# Patient Record
Sex: MALE | Race: WHITE | NOT HISPANIC OR LATINO | Employment: OTHER | URBAN - METROPOLITAN AREA
[De-identification: names, ages, dates, MRNs, and addresses within clinical notes are randomized per-mention and may not be internally consistent; named-entity substitution may affect disease eponyms.]

---

## 2017-06-04 ENCOUNTER — GENERIC CONVERSION - ENCOUNTER (OUTPATIENT)
Dept: OTHER | Facility: OTHER | Age: 67
End: 2017-06-04

## 2022-02-25 ENCOUNTER — OFFICE VISIT (OUTPATIENT)
Dept: GASTROENTEROLOGY | Facility: CLINIC | Age: 72
End: 2022-02-25
Payer: MEDICARE

## 2022-02-25 ENCOUNTER — TELEPHONE (OUTPATIENT)
Dept: GASTROENTEROLOGY | Facility: CLINIC | Age: 72
End: 2022-02-25

## 2022-02-25 VITALS
WEIGHT: 208.2 LBS | DIASTOLIC BLOOD PRESSURE: 70 MMHG | BODY MASS INDEX: 29.81 KG/M2 | SYSTOLIC BLOOD PRESSURE: 118 MMHG | HEIGHT: 70 IN

## 2022-02-25 DIAGNOSIS — R19.7 DIARRHEA, UNSPECIFIED TYPE: Primary | ICD-10-CM

## 2022-02-25 PROBLEM — G35 MULTIPLE SCLEROSIS (HCC): Status: ACTIVE | Noted: 2022-02-25

## 2022-02-25 PROBLEM — Z79.02 ANTIPLATELET OR ANTITHROMBOTIC LONG-TERM USE: Status: ACTIVE | Noted: 2022-02-25

## 2022-02-25 PROCEDURE — 99204 OFFICE O/P NEW MOD 45 MIN: CPT | Performed by: NURSE PRACTITIONER

## 2022-02-25 RX ORDER — CHOLECALCIFEROL (VITAMIN D3) 1250 MCG
CAPSULE ORAL
COMMUNITY
End: 2022-05-26

## 2022-02-25 RX ORDER — LOPERAMIDE HYDROCHLORIDE 2 MG/1
2 CAPSULE ORAL 2 TIMES DAILY PRN
COMMUNITY
Start: 2022-01-23 | End: 2022-05-26

## 2022-02-25 RX ORDER — ACETAMINOPHEN 325 MG/1
650 TABLET ORAL EVERY 6 HOURS PRN
COMMUNITY

## 2022-02-25 RX ORDER — CHOLESTYRAMINE 4 G/9G
1 POWDER, FOR SUSPENSION ORAL DAILY
Qty: 30 PACKET | Refills: 5 | Status: ON HOLD | OUTPATIENT
Start: 2022-02-25 | End: 2022-05-23 | Stop reason: DRUGHIGH

## 2022-02-25 RX ORDER — CLOPIDOGREL BISULFATE 75 MG/1
75 TABLET ORAL DAILY
COMMUNITY

## 2022-02-25 RX ORDER — AMOXICILLIN AND CLAVULANATE POTASSIUM 875; 125 MG/1; MG/1
TABLET, FILM COATED ORAL
COMMUNITY
Start: 2022-01-11 | End: 2022-04-08 | Stop reason: ALTCHOICE

## 2022-02-25 RX ORDER — ASPIRIN 81 MG/1
81 TABLET ORAL DAILY
COMMUNITY

## 2022-02-25 RX ORDER — TAMSULOSIN HYDROCHLORIDE 0.4 MG/1
0.4 CAPSULE ORAL
COMMUNITY

## 2022-02-25 RX ORDER — GABAPENTIN 300 MG/1
300 CAPSULE ORAL 2 TIMES DAILY
COMMUNITY
Start: 2022-02-20

## 2022-02-25 RX ORDER — IBUPROFEN 600 MG/1
TABLET ORAL
COMMUNITY
End: 2022-05-26

## 2022-02-25 RX ORDER — PANTOPRAZOLE SODIUM 40 MG/1
40 TABLET, DELAYED RELEASE ORAL DAILY
COMMUNITY

## 2022-02-25 RX ORDER — POLYETHYLENE GLYCOL 400 AND PROPYLENE GLYCOL 4; 3 MG/ML; MG/ML
SOLUTION/ DROPS OPHTHALMIC
COMMUNITY
End: 2022-05-26

## 2022-02-25 RX ORDER — ROPINIROLE 0.5 MG/1
0.5 TABLET, FILM COATED ORAL
COMMUNITY

## 2022-02-25 RX ORDER — SODIUM PHOSPHATE, DIBASIC AND SODIUM PHOSPHATE, MONOBASIC 7; 19 G/133ML; G/133ML
1 ENEMA RECTAL
COMMUNITY
End: 2022-05-26

## 2022-02-25 RX ORDER — LORATADINE 10 MG/1
10 TABLET ORAL DAILY
COMMUNITY

## 2022-02-25 RX ORDER — BACLOFEN 10 MG/1
10 TABLET ORAL EVERY 6 HOURS PRN
COMMUNITY

## 2022-02-25 RX ORDER — GABAPENTIN 600 MG/1
600 TABLET ORAL
COMMUNITY
Start: 2022-02-01

## 2022-02-25 RX ORDER — BISACODYL 10 MG
10 SUPPOSITORY, RECTAL RECTAL DAILY
COMMUNITY
End: 2022-05-26

## 2022-02-25 NOTE — TELEPHONE ENCOUNTER
Patient was seen on 02/25/2022 and a colonoscopy was ordered  Patient needs to be scheduled at [facility]  Please contact Melody Conde to schedule  The main number is [phone], nurses station [phone], fax number [phone]

## 2022-02-25 NOTE — LETTER
February 25, 2022     Ana Mcintosh MD  [address] [place] [address] [address]    Patient: Sujey Lutz   YOB: 1950   Date of Visit: 2/25/2022       Dear Dr Lazarus Bottcher:    Thank you for referring Joyce Bolden to me for evaluation  Below are my notes for this consultation  If you have questions, please do not hesitate to call me  I look forward to following your patient along with you  Sincerely,        CATARINO Magana        CC: No Recipients  Savana Tam  2/25/2022 12:24 PM  Sign when Signing Visit    [facility] - Outpatient Consultation  Sujey Lutz 70 y o  male MRN: [id number]  Encounter: [id number]    ASSESSMENT AND PLAN:      1  Diarrhea, unspecified type   5 month history of loose stools which have progressed to watery diarrhea with bowel movements 4-5 times per day  No abdominal pain but fecal urgency and incontinence  No rectal bleeding or melena  Stool cultures performed at nursing facility were negative in January  Differential diagnosis includes microscopic colitis, possible colon mass or polyp  Recommend colonoscopy -none prior  - scheduled for colonoscopy at [facility]  - will add cholestyramine 1 packet daily  Can increase to b i d  if no improvement in diarrhea  - consider breath test to evaluate for SIBO if colonoscopy does not reveal etiology of diarrhea    2  Multiple sclerosis  Minimal ability to ambulate, currently wheelchair-bound    Followup Appointment:  2 months  ______________________________________________________________________    Chief Complaint   Patient presents with    Diarrhea     since he got the flu shot  HPI:   Sujey Lutz is a 70y o  year old male  with history of multiple sclerosis  He is a resident at [facility] and is wheelchair-bound    He  presents with 5 month history of loose to watery stools    Patient feels that his symptoms began after receiving flu shot in October  He reports that his bowels were initially soft but since January has had watery diarrhea 4-5 times per day  Denies abdominal pain or cramping but experiences urgency and incontinence  Denies melena or rectal bleeding  Denies abdominal bloating  Stool cultures were obtained by his PCP in January and were negative for C diff, ova and parasite, salmonella , shigella and  Campylobacter   Appetite has been good and he denies recent unintentional weight loss  He denies recent  change in his medications or addition of new med    He has never had a colonoscopy      Historical Information   Past Medical History:   Diagnosis Date    Atrial fibrillation ([facility] [address] )     BPH (benign prostatic hyperplasia)     Congestive heart failure (CHF) (HCC)     Hyperlipidemia     Hypertension     Multiple sclerosis (HCC)     Neuropathy     RLS (restless legs syndrome)      History reviewed  No pertinent surgical history  Social History     Substance and Sexual Activity   Alcohol Use Not Currently     Social History     Substance and Sexual Activity   Drug Use Not on file     Social History     Tobacco Use   Smoking Status Never Smoker   Smokeless Tobacco Never Used     History reviewed  No pertinent family history      Meds/Allergies     Current Outpatient Medications:     acetaminophen (TYLENOL) 325 mg tablet    amoxicillin-clavulanate (AUGMENTIN) 875-125 mg per tablet    aspirin (Aspir-Low) 81 mg EC tablet    baclofen 10 mg tablet    bisacodyl (Dulcolax) 10 mg suppository    Cholecalciferol (Vitamin D3) 1 25 MG (26155 UT) CAPS    clopidogrel (PLAVIX) 75 mg tablet    EMOLLIENT CREAM & WOUND GEL EX    gabapentin (NEURONTIN) 300 mg capsule    gabapentin (NEURONTIN) 600 MG tablet    Glucagon, rDNA, (Glucagon Emergency) 1 MG KIT    insulin lispro (HumaLOG) 100 units/mL injection    loperamide (IMODIUM) 2 mg capsule    loratadine (CLARITIN) 10 mg tablet    magnesium hydroxide (MILK OF MAGNESIA) 400 mg/5 mL oral suspension    pantoprazole (PROTONIX) 40 mg tablet    polyethylene glycol-propylene glycol (Systane) 0 4-0 3 %    Probiotic Product (PROBIOTIC PO)    rOPINIRole (REQUIP) 0 5 mg tablet    sodium chloride 0 9 % SOLN 100 mL with insulin regular 100 units/mL SOLN 100 Units    sodium phosphate-biphosphate (FLEET) 7-19 g 118 mL enema    tamsulosin (Flomax) 0 4 mg    white petrolatum-corn starch-lanolin (TRIPLE PASTE) 12 8 % ointment    Allergies   Allergen Reactions    Metformin GI Intolerance       PHYSICAL EXAM:    Blood pressure 118/70, height 5' 10" (1 778 m), weight 94 4 kg (208 lb 3 2 oz)  Body mass index is 29 87 kg/m²  General Appearance: wheelchair-bound  Appears comfortable  Eyes: Anicteric  ENT:  Normocephalic, atraumatic, normal mucosa  Neck:  Supple, symmetrical, trachea midline,   Resp:  Clear to auscultation bilaterally; no rales, rhonchi or wheezing; respirations unlabored   CV:  S1 S2, Regular rate and rhythm; no murmur, rub, or gallop  GI:  Soft, non-tender, non-distended; normal bowel sounds; no masses, no organomegaly   Rectal: Deferred  Musculoskeletal: No cyanosis, clubbing or edema  Normal ROM  Skin:  No jaundice, rashes, or lesions   Psych: Normal affect, good eye contact  Neuro: No gross deficits, AAOx3 and conversing appropriately  Able to provide full history          REVIEW OF SYSTEMS:    CONSTITUTIONAL: Denies any fever, chills, rigors, and weight loss  HEENT: No earache or tinnitus  Denies hearing loss or visual disturbances  CARDIOVASCULAR: No chest pain or palpitations  RESPIRATORY: Denies any cough, hemoptysis, shortness of breath or dyspnea on exertion  GASTROINTESTINAL: As noted in the History of Present Illness  GENITOURINARY: No problems with urination  Denies any hematuria or dysuria  NEUROLOGIC: No dizziness or vertigo, denies headaches  MUSCULOSKELETAL: Denies any muscle or joint pain  SKIN: Denies skin rashes or itching     ENDOCRINE: Denies excessive thirst  Denies intolerance to heat or cold  PSYCHOSOCIAL: Denies depression or anxiety  Denies any recent memory loss

## 2022-02-25 NOTE — PROGRESS NOTES
[facility] - Outpatient Consultation  Quiana Casanova 70 y o  male MRN: [id number]  Encounter: [id number]    ASSESSMENT AND PLAN:      1  Diarrhea, unspecified type   5 month history of loose stools which have progressed to watery diarrhea with bowel movements 4-5 times per day  No abdominal pain but fecal urgency and incontinence  No rectal bleeding or melena  Stool cultures performed at nursing facility were negative in January  Differential diagnosis includes microscopic colitis, possible colon mass or polyp  Recommend colonoscopy -none prior  - scheduled for colonoscopy at [facility]  - will add cholestyramine 1 packet daily  Can increase to b i d  if no improvement in diarrhea  - consider breath test to evaluate for SIBO if colonoscopy does not reveal etiology of diarrhea    2  Multiple sclerosis  Minimal ability to ambulate, currently wheelchair-bound    3  Anti-platelet therapy  4  Atherosclerotic disease  Currently taking aspirin 81 mg daily and clopidogrel 75 mg daily  Discussed with patient the risk of bleeding with clopidogrel and need to hold medication for 5 days prior to colonoscopy  Discuss risks of thromboembolic event with holding clopidogrel  He verbalizes understanding  - hold clopidogrel 75 mg daily 5 days prior to colonoscopy  - will check with his PCP to ensure no further recommendations or instructions needed    Followup Appointment:  2 months  ______________________________________________________________________    Chief Complaint   Patient presents with    Diarrhea     since he got the flu shot  HPI:   Quiana Casanova is a 70y o  year old male  with history of multiple sclerosis  He is a resident at [facility] and is wheelchair-bound    He  presents with 5 month history of loose to watery stools  Patient feels that his symptoms began after receiving flu shot in October    He reports that his bowels were initially soft but since January has had watery diarrhea 4-5 times per day  Denies abdominal pain or cramping but experiences urgency and incontinence  Denies melena or rectal bleeding  Denies abdominal bloating  Stool cultures were obtained by his PCP in January and were negative for C diff, ova and parasite, salmonella , shigella and  Campylobacter   Appetite has been good and he denies recent unintentional weight loss  He denies recent  change in his medications or addition of new med    He has never had a colonoscopy      Historical Information   Past Medical History:   Diagnosis Date    At[facility] fibrillation ([facility] [address] )     BPH (benign prostatic hyperplasia)     Congestive heart failure (CHF) (HCC)     Hyperlipidemia     Hypertension     Multiple sclerosis (HCC)     Neuropathy     RLS (restless legs syndrome)      History reviewed  No pertinent surgical history  Social History     Substance and Sexual Activity   Alcohol Use Not Currently     Social History     Substance and Sexual Activity   Drug Use Not on file     Social History     Tobacco Use   Smoking Status Never Smoker   Smokeless Tobacco Never Used     History reviewed  No pertinent family history      Meds/Allergies     Current Outpatient Medications:     acetaminophen (TYLENOL) 325 mg tablet    amoxicillin-clavulanate (AUGMENTIN) 875-125 mg per tablet    aspirin (Aspir-Low) 81 mg EC tablet    baclofen 10 mg tablet    bisacodyl (Dulcolax) 10 mg suppository    Cholecalciferol (Vitamin D3) 1 25 MG (37693 UT) CAPS    clopidogrel (PLAVIX) 75 mg tablet    EMOLLIENT CREAM & WOUND GEL EX    gabapentin (NEURONTIN) 300 mg capsule    gabapentin (NEURONTIN) 600 MG tablet    Glucagon, rDNA, (Glucagon Emergency) 1 MG KIT    insulin lispro (HumaLOG) 100 units/mL injection    loperamide (IMODIUM) 2 mg capsule    loratadine (CLARITIN) 10 mg tablet    magnesium hydroxide (MILK OF MAGNESIA) 400 mg/5 mL oral suspension    pantoprazole (PROTONIX) 40 mg tablet    polyethylene glycol-propylene glycol (Systane) 0 4-0 3 %    Probiotic Product (PROBIOTIC PO)    rOPINIRole (REQUIP) 0 5 mg tablet    sodium chloride 0 9 % SOLN 100 mL with insulin regular 100 units/mL SOLN 100 Units    sodium phosphate-biphosphate (FLEET) 7-19 g 118 mL enema    tamsulosin (Flomax) 0 4 mg    white petrolatum-corn starch-lanolin (TRIPLE PASTE) 12 8 % ointment    Allergies   Allergen Reactions    Metformin GI Intolerance       PHYSICAL EXAM:    Blood pressure 118/70, height 5' 10" (1 778 m), weight 94 4 kg (208 lb 3 2 oz)  Body mass index is 29 87 kg/m²  General Appearance: wheelchair-bound  Appears comfortable  Eyes: Anicteric  ENT:  Normocephalic, atraumatic, normal mucosa  Neck:  Supple, symmetrical, trachea midline,   Resp:  Clear to auscultation bilaterally; no rales, rhonchi or wheezing; respirations unlabored   CV:  S1 S2, Regular rate and rhythm; no murmur, rub, or gallop  GI:  Soft, non-tender, non-distended; normal bowel sounds; no masses, no organomegaly   Rectal: Deferred  Musculoskeletal: No cyanosis, clubbing or edema  Normal ROM  Skin:  No jaundice, rashes, or lesions   Psych: Normal affect, good eye contact  Neuro: No gross deficits, AAOx3 and conversing appropriately  Able to provide full history          REVIEW OF SYSTEMS:    CONSTITUTIONAL: Denies any fever, chills, rigors, and weight loss  HEENT: No earache or tinnitus  Denies hearing loss or visual disturbances  CARDIOVASCULAR: No chest pain or palpitations  RESPIRATORY: Denies any cough, hemoptysis, shortness of breath or dyspnea on exertion  GASTROINTESTINAL: As noted in the History of Present Illness  GENITOURINARY: No problems with urination  Denies any hematuria or dysuria  NEUROLOGIC: No dizziness or vertigo, denies headaches  MUSCULOSKELETAL: generalized weakness of extremities due to multiple sclerosis, minimally ambulatory  SKIN: Denies skin rashes or itching     ENDOCRINE: Denies excessive thirst  Denies intolerance to heat or cold  PSYCHOSOCIAL: Denies depression or anxiety  Denies any recent memory loss

## 2022-03-01 NOTE — TELEPHONE ENCOUNTER
[facility] pt for 3/29 colon at [facility] for colon  Didn't realize pt assessment was not done at visit- this will need to be completed during prep or plavix clearance call once obtained  faxed res sheet   paperwork given to MA dept  Thank you

## 2022-03-14 DIAGNOSIS — R19.7 DIARRHEA, UNSPECIFIED TYPE: Primary | ICD-10-CM

## 2022-03-14 NOTE — TELEPHONE ENCOUNTER
Scheduled date of colonoscopy (as of today): 3/29/2022  Physician performing colonoscopy: Dr Mushtaq Lee  Location of colonoscopy: [facility]  Bowel prep reviewed with patient: Jermain  Instructions reviewed with patient by: Jin Lehman CMA  Clearances: PLAVIX

## 2022-04-08 ENCOUNTER — OFFICE VISIT (OUTPATIENT)
Dept: GASTROENTEROLOGY | Facility: CLINIC | Age: 72
End: 2022-04-08
Payer: MEDICARE

## 2022-04-08 VITALS — BODY MASS INDEX: 29.87 KG/M2 | DIASTOLIC BLOOD PRESSURE: 68 MMHG | SYSTOLIC BLOOD PRESSURE: 114 MMHG | HEIGHT: 70 IN

## 2022-04-08 DIAGNOSIS — R19.7 DIARRHEA, UNSPECIFIED TYPE: Primary | ICD-10-CM

## 2022-04-08 PROCEDURE — 99213 OFFICE O/P EST LOW 20 MIN: CPT | Performed by: NURSE PRACTITIONER

## 2022-04-08 RX ORDER — CHOLESTYRAMINE LIGHT 4 G/5.7G
4 POWDER, FOR SUSPENSION ORAL 2 TIMES DAILY
COMMUNITY

## 2022-04-08 NOTE — LETTER
April 8, 2022     Salo Frank MD  [address]    Patient: Renea Rockwell   YOB: 1950   Date of Visit: 4/8/2022       Dear Dr Belinda Archuleta:    Thank you for referring Joe Tavarez to me for evaluation  Below are my notes for this consultation  If you have questions, please do not hesitate to call me  I look forward to following your patient along with you  Sincerely,        CATARINO Theodore        CC: No Recipients  CATARINO Theodore  4/8/2022 12:47 PM  Sign when Signing Visit  [facility] - Outpatient Follow-up Note  Renea Rockwell 70 y o  male MRN: [id number]  Encounter: [id number]    ASSESSMENT AND PLAN:      1  Diarrhea, unspecified type  Persistent  loose stools but denies watery diarrhea  Associated fecal urgency and some incontinence  Currently having 3 bowel movements daily and reports some improvement with Questran  Colonoscopy 03/2022 was normal, biopsies negative for microscopic colitis  Consider SIBO -he is diabetic  - check breath test to rule out SIBO  - continue Questran b i d   - Imodium p r n  Followup Appointment:  2-3 months  ______________________________________________________________________    Chief Complaint   Patient presents with    Follow-up - still with diarrhea     Feels like he lost weight but no recent weight  HPI:  Presents in follow-up today for persistent diarrhea  He underwent colonoscopy 03/29/2022 which was normal   Biopsies were negative for microscopic colitis  Patient continues to report 3 loose bowel movements daily and continues to have fecal urgency and incontinence  Does feel some improvement with addition of Questran b i d  Denies recent melena or rectal bleeding    Appetite good, no nausea or vomiting, no unintentional weight loss    Denies reflux symptoms    Medication list reviewed -he has not started any new medications and not taking any medications known to cause diarrhea     resident at [facility]  Historical Information   Past Medical History:   Diagnosis Date    At[facility] fibrillation ([facility] [address] )     BPH (benign prostatic hyperplasia)     Congestive heart failure (CHF) (HCC)     Hyperlipidemia     Hypertension     Multiple sclerosis (HCC)     Neuropathy     RLS (restless legs syndrome)      History reviewed  No pertinent surgical history  Social History     Substance and Sexual Activity   Alcohol Use Not Currently     Social History     Substance and Sexual Activity   Drug Use Not on file     Social History     Tobacco Use   Smoking Status Never Smoker   Smokeless Tobacco Never Used     History reviewed  No pertinent family history        Current Outpatient Medications:     acetaminophen (TYLENOL) 325 mg tablet    amoxicillin-clavulanate (AUGMENTIN) 875-125 mg per tablet    aspirin (Aspir-Low) 81 mg EC tablet    baclofen 10 mg tablet    bisacodyl (Dulcolax) 10 mg suppository    Cholecalciferol (Vitamin D3) 1 25 MG (60213 UT) CAPS    cholestyramine (QUESTRAN) 4 g packet    cholestyramine sugar free (QUESTRAN LIGHT) 4 g packet    clopidogrel (PLAVIX) 75 mg tablet    EMOLLIENT CREAM & WOUND GEL EX    gabapentin (NEURONTIN) 300 mg capsule    gabapentin (NEURONTIN) 600 MG tablet    Glucagon, rDNA, (Glucagon Emergency) 1 MG KIT    insulin lispro (HumaLOG) 100 units/mL injection    loperamide (IMODIUM) 2 mg capsule    loratadine (CLARITIN) 10 mg tablet    magnesium hydroxide (MILK OF MAGNESIA) 400 mg/5 mL oral suspension    pantoprazole (PROTONIX) 40 mg tablet    polyethylene glycol-propylene glycol (Systane) 0 4-0 3 %    Probiotic Product (PROBIOTIC PO)    rOPINIRole (REQUIP) 0 5 mg tablet    sodium chloride 0 9 % SOLN 100 mL with insulin regular 100 units/mL SOLN 100 Units    sodium phosphate-biphosphate (FLEET) 7-19 g 118 mL enema    tamsulosin (Flomax) 0 4 mg    white petrolatum-corn starch-lanolin (TRIPLE PASTE) 12 8 % ointment    polyethylene glycol (GOLYTELY) 4000 mL solution  Allergies   Allergen Reactions    Metformin GI Intolerance     Reviewed medications and allergies and updated as indicated    PHYSICAL EXAM:    Blood pressure 114/68, height 5' 10" (1 778 m)  Body mass index is 29 87 kg/m²  General Appearance: NAD, cooperative, alert  Eyes: Anicteric  ENT:  Normocephalic, atraumatic, normal mucosa  Neck:  Supple, symmetrical, trachea midline  Resp:  Clear to auscultation bilaterally; no rales, rhonchi or wheezing; respirations unlabored   CV:  S1 S2, Regular rate and rhythm; no murmur, rub, or gallop  GI:  Soft, non-tender, non-distended; normal bowel sounds; no masses, no organomegaly   Rectal: Deferred  Musculoskeletal: No cyanosis, clubbing or edema  Normal ROM    Skin:  No jaundice, rashes, or lesions   Psych: Normal affect, good eye contact  Neuro: No gross deficits, AAOx3

## 2022-04-08 NOTE — PROGRESS NOTES
[facility] - Outpatient Follow-up Note  Sai Morales 70 y o  male MRN: [id number]  Encounter: [id number]    ASSESSMENT AND PLAN:      1  Diarrhea, unspecified type  Persistent  loose stools but denies watery diarrhea  Associated fecal urgency and some incontinence  Currently having 3 bowel movements daily and reports some improvement with Questran  Colonoscopy 03/2022 was normal, biopsies negative for microscopic colitis  Consider SIBO -he is diabetic  - check breath test to rule out SIBO  - continue Questran b i d   - Imodium p r n  Followup Appointment:  2-3 months  ______________________________________________________________________    Chief Complaint   Patient presents with    Follow-up - still with diarrhea     Feels like he lost weight but no recent weight  HPI:  Presents in follow-up today for persistent diarrhea  He underwent colonoscopy 03/29/2022 which was normal   Biopsies were negative for microscopic colitis  Patient continues to report 3 loose bowel movements daily and continues to have fecal urgency and incontinence  Does feel some improvement with addition of Questran b i d  Denies recent melena or rectal bleeding    Appetite good, no nausea or vomiting, no unintentional weight loss  Denies reflux symptoms    Medication list reviewed -he has not started any new medications and not taking any medications known to cause diarrhea     resident at [facility]  Historical Information   Past Medical History:   Diagnosis Date    [facility] fibrillation ([facility] [address] )     BPH (benign prostatic hyperplasia)     [facility]     Hyperlipidemia     Hypertension     Multiple sclerosis (HCC)     Neuropathy     RLS (restless legs syndrome)      History reviewed  No pertinent surgical history    Social History     Substance and Sexual Activity   Alcohol Use Not Currently     Social History     Substance and Sexual Activity   Drug Use Not on file     Social History     Tobacco Use   Smoking Status Never Smoker   Smokeless Tobacco Never Used     History reviewed  No pertinent family history  Current Outpatient Medications:     acetaminophen (TYLENOL) 325 mg tablet    amoxicillin-clavulanate (AUGMENTIN) 875-125 mg per tablet    aspirin (Aspir-Low) 81 mg EC tablet    baclofen 10 mg tablet    bisacodyl (Dulcolax) 10 mg suppository    Cholecalciferol (Vitamin D3) 1 25 MG (79130 UT) CAPS    cholestyramine (QUESTRAN) 4 g packet    cholestyramine sugar free (QUESTRAN LIGHT) 4 g packet    clopidogrel (PLAVIX) 75 mg tablet    EMOLLIENT CREAM & WOUND GEL EX    gabapentin (NEURONTIN) 300 mg capsule    gabapentin (NEURONTIN) 600 MG tablet    Glucagon, rDNA, (Glucagon Emergency) 1 MG KIT    insulin lispro (HumaLOG) 100 units/mL injection    loperamide (IMODIUM) 2 mg capsule    loratadine (CLARITIN) 10 mg tablet    magnesium hydroxide (MILK OF MAGNESIA) 400 mg/5 mL oral suspension    pantoprazole (PROTONIX) 40 mg tablet    polyethylene glycol-propylene glycol (Systane) 0 4-0 3 %    Probiotic Product (PROBIOTIC PO)    rOPINIRole (REQUIP) 0 5 mg tablet    sodium chloride 0 9 % SOLN 100 mL with insulin regular 100 units/mL SOLN 100 Units    sodium phosphate-biphosphate (FLEET) 7-19 g 118 mL enema    tamsulosin (Flomax) 0 4 mg    white petrolatum-corn starch-lanolin (TRIPLE PASTE) 12 8 % ointment    polyethylene glycol (GOLYTELY) 4000 mL solution  Allergies   Allergen Reactions    Metformin GI Intolerance     Reviewed medications and allergies and updated as indicated    PHYSICAL EXAM:    Blood pressure 114/68, height 5' 10" (1 778 m)  Body mass index is 29 87 kg/m²  General Appearance: NAD, cooperative, alert  Eyes: Anicteric  ENT:  Normocephalic, atraumatic, normal mucosa      Neck:  Supple, symmetrical, trachea midline  Resp:  Clear to auscultation bilaterally; no rales, rhonchi or wheezing; respirations unlabored   CV:  S1 S2, Regular rate and rhythm; no murmur, rub, or gallop  GI:  Soft, non-tender, non-distended; normal bowel sounds; no masses, no organomegaly   Rectal: Deferred  Musculoskeletal: No cyanosis, clubbing or edema  Normal ROM    Skin:  No jaundice, rashes, or lesions   Psych: Normal affect, good eye contact  Neuro: No gross deficits, AAOx3

## 2022-04-20 ENCOUNTER — OFFICE VISIT (OUTPATIENT)
Dept: GASTROENTEROLOGY | Facility: CLINIC | Age: 72
End: 2022-04-20
Payer: MEDICARE

## 2022-04-20 DIAGNOSIS — R19.7 DIARRHEA, UNSPECIFIED TYPE: Primary | ICD-10-CM

## 2022-04-20 PROCEDURE — 91065 BREATH HYDROGEN/METHANE TEST: CPT | Performed by: NURSE PRACTITIONER

## 2022-04-21 NOTE — PROGRESS NOTES
[facility] SPECIALTY [facility]       Bacterial Overgrowth Analytical Record    Apurva Dubon 70 y o  male MRN: [id number]      Date of Test: 4/18/2022    Substrate Given: Lactulose    Ordering Provider: CATARINO Emery    Medical Assistant    Symptoms: diarrhea    The patient presents for bacterial overgrowth testing  Patient fasted overnight  Baseline readings obtained  Breath test performed every 20 min for a total of 3 hr    Sample Clock Time ppmH2 ppmCH4 Co2% Maria G   Baseline   10:04 1 43 4 6 1 19   #1  20 minutes 10:28 1 40 4 6 1 19   #2  40 minutes 10:52 1 40 4 5 1 22   #3  60 minutes 11:13 3 49 4 2 1 30   #4  80 minutes 11:36 4 54 4 7 1 17   #5  100 minutes 11:57 2 55 4 7 1 17   #6  120 minutes 12:22 2 46 4 5 1 22   #7  140 minutes 12:45 4 44 4 2 1 30   #8  160 minutes 1:06 4 38 3 9 1 41   #9  180 minutes 1:29 9 50 4 4 1 25       Physician interpretation:  Results reviewed with Dr Ting Garcia    Breath test negative for SIBO  Patient is resident at [facility] contact nursing facility to inform Nursing of results

## 2022-04-25 ENCOUNTER — TELEPHONE (OUTPATIENT)
Dept: GASTROENTEROLOGY | Facility: CLINIC | Age: 72
End: 2022-04-25

## 2022-04-25 DIAGNOSIS — R19.7 DIARRHEA, UNSPECIFIED TYPE: Primary | ICD-10-CM

## 2022-04-25 NOTE — TELEPHONE ENCOUNTER
Spoke to RN at [facility] and informed her pt's breath test is negative  Recent colonoscopy normal, biopsy negative for microscopic colitis  Nursing reports he is still having soft stools 4-5 times per day  Taking Questran b i d  which has helped somewhat   will check stool cultures for C diff, ova and parasite, Giardia and enteric bacterial panel    If cultures negative, will begin Imodium or Lomotil 1-2 times per day scheduled dosing

## 2022-05-22 ENCOUNTER — APPOINTMENT (EMERGENCY)
Dept: RADIOLOGY | Facility: HOSPITAL | Age: 72
DRG: 291 | End: 2022-05-22
Payer: MEDICARE

## 2022-05-22 ENCOUNTER — HOSPITAL ENCOUNTER (INPATIENT)
Facility: HOSPITAL | Age: 72
LOS: 4 days | Discharge: NURSING, REHAB OR LONG-TERM CARE | DRG: 291 | End: 2022-05-26
Attending: EMERGENCY MEDICINE | Admitting: STUDENT IN AN ORGANIZED HEALTH CARE EDUCATION/TRAINING PROGRAM
Payer: MEDICARE

## 2022-05-22 DIAGNOSIS — E11.69 TYPE 2 DIABETES MELLITUS WITH OTHER SPECIFIED COMPLICATION, WITH LONG-TERM CURRENT USE OF INSULIN (HCC): ICD-10-CM

## 2022-05-22 DIAGNOSIS — J96.00 ACUTE RESPIRATORY FAILURE, UNSPECIFIED WHETHER WITH HYPOXIA OR HYPERCAPNIA (HCC): ICD-10-CM

## 2022-05-22 DIAGNOSIS — J81.0 ACUTE PULMONARY EDEMA (HCC): Primary | ICD-10-CM

## 2022-05-22 DIAGNOSIS — Z79.4 TYPE 2 DIABETES MELLITUS WITH OTHER SPECIFIED COMPLICATION, WITH LONG-TERM CURRENT USE OF INSULIN (HCC): ICD-10-CM

## 2022-05-22 DIAGNOSIS — G35 MULTIPLE SCLEROSIS (HCC): ICD-10-CM

## 2022-05-22 DIAGNOSIS — I10 PRIMARY HYPERTENSION: ICD-10-CM

## 2022-05-22 DIAGNOSIS — D75.839 THROMBOCYTOSIS: ICD-10-CM

## 2022-05-22 DIAGNOSIS — R77.8 ELEVATED TROPONIN: ICD-10-CM

## 2022-05-22 LAB
ALBUMIN SERPL BCP-MCNC: 3.3 G/DL (ref 3.5–5)
ALP SERPL-CCNC: 121 U/L (ref 46–116)
ALT SERPL W P-5'-P-CCNC: 12 U/L (ref 12–78)
ANION GAP SERPL CALCULATED.3IONS-SCNC: 7 MMOL/L (ref 4–13)
APTT PPP: 37 SECONDS (ref 23–37)
AST SERPL W P-5'-P-CCNC: 28 U/L (ref 5–45)
BASE EXCESS BLDA CALC-SCNC: -2 MMOL/L (ref -2–3)
BASOPHILS # BLD AUTO: 0.12 THOUSANDS/ΜL (ref 0–0.1)
BASOPHILS NFR BLD AUTO: 1 % (ref 0–1)
BILIRUB SERPL-MCNC: 0.4 MG/DL (ref 0.2–1)
BUN SERPL-MCNC: 21 MG/DL (ref 5–25)
CA-I BLD-SCNC: 1.11 MMOL/L (ref 1.12–1.32)
CALCIUM ALBUM COR SERPL-MCNC: 9 MG/DL (ref 8.3–10.1)
CALCIUM SERPL-MCNC: 8.4 MG/DL (ref 8.3–10.1)
CARDIAC TROPONIN I PNL SERPL HS: 448 NG/L
CHLORIDE SERPL-SCNC: 107 MMOL/L (ref 100–108)
CO2 SERPL-SCNC: 24 MMOL/L (ref 21–32)
CREAT SERPL-MCNC: 1.18 MG/DL (ref 0.6–1.3)
EOSINOPHIL # BLD AUTO: 0.37 THOUSAND/ΜL (ref 0–0.61)
EOSINOPHIL NFR BLD AUTO: 3 % (ref 0–6)
ERYTHROCYTE [DISTWIDTH] IN BLOOD BY AUTOMATED COUNT: 19.7 % (ref 11.6–15.1)
FLUAV RNA RESP QL NAA+PROBE: NEGATIVE
FLUBV RNA RESP QL NAA+PROBE: NEGATIVE
GFR SERPL CREATININE-BSD FRML MDRD: 61 ML/MIN/1.73SQ M
GLUCOSE SERPL-MCNC: 239 MG/DL (ref 65–140)
GLUCOSE SERPL-MCNC: 249 MG/DL (ref 65–140)
HCO3 BLDA-SCNC: 22.6 MMOL/L (ref 24–30)
HCT VFR BLD AUTO: 41.5 % (ref 36.5–49.3)
HCT VFR BLD CALC: 41 % (ref 36.5–49.3)
HGB BLD-MCNC: 12.6 G/DL (ref 12–17)
HGB BLDA-MCNC: 13.9 G/DL (ref 12–17)
IMM GRANULOCYTES # BLD AUTO: 0.08 THOUSAND/UL (ref 0–0.2)
IMM GRANULOCYTES NFR BLD AUTO: 1 % (ref 0–2)
INR PPP: 1.13 (ref 0.84–1.19)
LYMPHOCYTES # BLD AUTO: 0.86 THOUSANDS/ΜL (ref 0.6–4.47)
LYMPHOCYTES NFR BLD AUTO: 6 % (ref 14–44)
MCH RBC QN AUTO: 26.5 PG (ref 26.8–34.3)
MCHC RBC AUTO-ENTMCNC: 30.4 G/DL (ref 31.4–37.4)
MCV RBC AUTO: 87 FL (ref 82–98)
MONOCYTES # BLD AUTO: 1 THOUSAND/ΜL (ref 0.17–1.22)
MONOCYTES NFR BLD AUTO: 7 % (ref 4–12)
NEUTROPHILS # BLD AUTO: 11.85 THOUSANDS/ΜL (ref 1.85–7.62)
NEUTS SEG NFR BLD AUTO: 82 % (ref 43–75)
NRBC BLD AUTO-RTO: 0 /100 WBCS
NT-PROBNP SERPL-MCNC: 389 PG/ML
PCO2 BLD: 24 MMOL/L (ref 21–32)
PCO2 BLD: 38.1 MM HG (ref 42–50)
PH BLD: 7.38 [PH] (ref 7.3–7.4)
PLATELET # BLD AUTO: 1156 THOUSANDS/UL (ref 149–390)
PMV BLD AUTO: 10.8 FL (ref 8.9–12.7)
PO2 BLD: 71 MM HG (ref 35–45)
POTASSIUM BLD-SCNC: 3.8 MMOL/L (ref 3.5–5.3)
POTASSIUM SERPL-SCNC: 3.6 MMOL/L (ref 3.5–5.3)
PROT SERPL-MCNC: 7.4 G/DL (ref 6.4–8.2)
PROTHROMBIN TIME: 14.4 SECONDS (ref 11.6–14.5)
RBC # BLD AUTO: 4.76 MILLION/UL (ref 3.88–5.62)
RSV RNA RESP QL NAA+PROBE: NEGATIVE
SAO2 % BLD FROM PO2: 94 % (ref 60–85)
SARS-COV-2 RNA RESP QL NAA+PROBE: NEGATIVE
SODIUM BLD-SCNC: 140 MMOL/L (ref 136–145)
SODIUM SERPL-SCNC: 138 MMOL/L (ref 136–145)
SPECIMEN SOURCE: ABNORMAL
WBC # BLD AUTO: 14.28 THOUSAND/UL (ref 4.31–10.16)

## 2022-05-22 PROCEDURE — 85025 COMPLETE CBC W/AUTO DIFF WBC: CPT | Performed by: EMERGENCY MEDICINE

## 2022-05-22 PROCEDURE — 82947 ASSAY GLUCOSE BLOOD QUANT: CPT

## 2022-05-22 PROCEDURE — 83880 ASSAY OF NATRIURETIC PEPTIDE: CPT | Performed by: EMERGENCY MEDICINE

## 2022-05-22 PROCEDURE — 99291 CRITICAL CARE FIRST HOUR: CPT | Performed by: EMERGENCY MEDICINE

## 2022-05-22 PROCEDURE — 71045 X-RAY EXAM CHEST 1 VIEW: CPT

## 2022-05-22 PROCEDURE — 1123F ACP DISCUSS/DSCN MKR DOCD: CPT | Performed by: INTERNAL MEDICINE

## 2022-05-22 PROCEDURE — 84484 ASSAY OF TROPONIN QUANT: CPT | Performed by: EMERGENCY MEDICINE

## 2022-05-22 PROCEDURE — 84295 ASSAY OF SERUM SODIUM: CPT

## 2022-05-22 PROCEDURE — 94002 VENT MGMT INPAT INIT DAY: CPT

## 2022-05-22 PROCEDURE — 94760 N-INVAS EAR/PLS OXIMETRY 1: CPT

## 2022-05-22 PROCEDURE — 0241U HB NFCT DS VIR RESP RNA 4 TRGT: CPT | Performed by: EMERGENCY MEDICINE

## 2022-05-22 PROCEDURE — 85730 THROMBOPLASTIN TIME PARTIAL: CPT | Performed by: EMERGENCY MEDICINE

## 2022-05-22 PROCEDURE — 85610 PROTHROMBIN TIME: CPT | Performed by: EMERGENCY MEDICINE

## 2022-05-22 PROCEDURE — 36415 COLL VENOUS BLD VENIPUNCTURE: CPT | Performed by: EMERGENCY MEDICINE

## 2022-05-22 PROCEDURE — 93005 ELECTROCARDIOGRAM TRACING: CPT

## 2022-05-22 PROCEDURE — 84132 ASSAY OF SERUM POTASSIUM: CPT

## 2022-05-22 PROCEDURE — 83036 HEMOGLOBIN GLYCOSYLATED A1C: CPT | Performed by: PHYSICIAN ASSISTANT

## 2022-05-22 PROCEDURE — 85014 HEMATOCRIT: CPT

## 2022-05-22 PROCEDURE — 99291 CRITICAL CARE FIRST HOUR: CPT

## 2022-05-22 PROCEDURE — 82803 BLOOD GASES ANY COMBINATION: CPT

## 2022-05-22 PROCEDURE — 99223 1ST HOSP IP/OBS HIGH 75: CPT | Performed by: PHYSICIAN ASSISTANT

## 2022-05-22 PROCEDURE — 96374 THER/PROPH/DIAG INJ IV PUSH: CPT

## 2022-05-22 PROCEDURE — 82330 ASSAY OF CALCIUM: CPT

## 2022-05-22 PROCEDURE — 80053 COMPREHEN METABOLIC PANEL: CPT | Performed by: EMERGENCY MEDICINE

## 2022-05-22 RX ORDER — FUROSEMIDE 10 MG/ML
40 INJECTION INTRAMUSCULAR; INTRAVENOUS ONCE
Status: COMPLETED | OUTPATIENT
Start: 2022-05-22 | End: 2022-05-22

## 2022-05-22 RX ORDER — NITROGLYCERIN 20 MG/100ML
5-200 INJECTION INTRAVENOUS
Status: DISCONTINUED | OUTPATIENT
Start: 2022-05-22 | End: 2022-05-22

## 2022-05-22 RX ORDER — SODIUM CHLORIDE 9 MG/ML
3 INJECTION INTRAVENOUS
Status: DISCONTINUED | OUTPATIENT
Start: 2022-05-22 | End: 2022-05-23

## 2022-05-22 RX ADMIN — FUROSEMIDE 40 MG: 10 INJECTION, SOLUTION INTRAVENOUS at 21:59

## 2022-05-22 NOTE — Clinical Note
Case was discussed with VALERIE WALKER and the patient's admission status was agreed to be Admission Status: observation status to the service of Dr Camacho Donovan

## 2022-05-23 ENCOUNTER — APPOINTMENT (INPATIENT)
Dept: NON INVASIVE DIAGNOSTICS | Facility: HOSPITAL | Age: 72
DRG: 291 | End: 2022-05-23
Payer: MEDICARE

## 2022-05-23 PROBLEM — Z79.4 TYPE 2 DIABETES MELLITUS, WITH LONG-TERM CURRENT USE OF INSULIN (HCC): Status: ACTIVE | Noted: 2022-05-23

## 2022-05-23 PROBLEM — D75.839 THROMBOCYTOSIS: Status: ACTIVE | Noted: 2022-05-23

## 2022-05-23 PROBLEM — J81.0 ACUTE PULMONARY EDEMA (HCC): Status: ACTIVE | Noted: 2022-05-23

## 2022-05-23 PROBLEM — R65.10 SIRS (SYSTEMIC INFLAMMATORY RESPONSE SYNDROME) (HCC): Status: ACTIVE | Noted: 2022-05-23

## 2022-05-23 PROBLEM — J96.00 ACUTE RESPIRATORY FAILURE (HCC): Status: ACTIVE | Noted: 2022-05-23

## 2022-05-23 PROBLEM — I25.10 CORONARY ARTERY DISEASE INVOLVING NATIVE CORONARY ARTERY: Status: ACTIVE | Noted: 2022-05-23

## 2022-05-23 PROBLEM — E11.9 TYPE 2 DIABETES MELLITUS, WITH LONG-TERM CURRENT USE OF INSULIN (HCC): Status: ACTIVE | Noted: 2022-05-23

## 2022-05-23 PROBLEM — R77.8 ELEVATED TROPONIN: Status: ACTIVE | Noted: 2022-05-23

## 2022-05-23 PROBLEM — J96.01 ACUTE RESPIRATORY FAILURE WITH HYPOXIA (HCC): Status: ACTIVE | Noted: 2022-05-23

## 2022-05-23 LAB
2HR DELTA HS TROPONIN: 215 NG/L
4HR DELTA HS TROPONIN: 642 NG/L
ALBUMIN SERPL BCP-MCNC: 3.4 G/DL (ref 3.5–5)
ALP SERPL-CCNC: 114 U/L (ref 46–116)
ALT SERPL W P-5'-P-CCNC: 17 U/L (ref 12–78)
ANION GAP SERPL CALCULATED.3IONS-SCNC: 8 MMOL/L (ref 4–13)
AORTIC ROOT: 3.3 CM
APICAL FOUR CHAMBER EJECTION FRACTION: 55 %
APTT PPP: 39 SECONDS (ref 23–37)
APTT PPP: 43 SECONDS (ref 23–37)
APTT PPP: 50 SECONDS (ref 23–37)
ASCENDING AORTA: 3.1 CM
AST SERPL W P-5'-P-CCNC: 31 U/L (ref 5–45)
ATRIAL RATE: 121 BPM
ATRIAL RATE: 91 BPM
BACTERIA UR QL AUTO: ABNORMAL /HPF
BILIRUB SERPL-MCNC: 0.4 MG/DL (ref 0.2–1)
BILIRUB UR QL STRIP: NEGATIVE
BUN SERPL-MCNC: 21 MG/DL (ref 5–25)
CALCIUM ALBUM COR SERPL-MCNC: 9.1 MG/DL (ref 8.3–10.1)
CALCIUM SERPL-MCNC: 8.6 MG/DL (ref 8.3–10.1)
CARDIAC TROPONIN I PNL SERPL HS: 1090 NG/L
CARDIAC TROPONIN I PNL SERPL HS: 1439 NG/L (ref 8–18)
CARDIAC TROPONIN I PNL SERPL HS: 663 NG/L
CHLORIDE SERPL-SCNC: 104 MMOL/L (ref 100–108)
CLARITY UR: CLEAR
CO2 SERPL-SCNC: 30 MMOL/L (ref 21–32)
COLOR UR: YELLOW
CREAT SERPL-MCNC: 1.29 MG/DL (ref 0.6–1.3)
DOP CALC LVOT AREA: 4.91 CM2
DOP CALC LVOT DIAMETER: 2.5 CM
E WAVE DECELERATION TIME: 182 MS
ERYTHROCYTE [DISTWIDTH] IN BLOOD BY AUTOMATED COUNT: 19.8 % (ref 11.6–15.1)
EST. AVERAGE GLUCOSE BLD GHB EST-MCNC: 105 MG/DL
FRACTIONAL SHORTENING: 36 % (ref 28–44)
GFR SERPL CREATININE-BSD FRML MDRD: 55 ML/MIN/1.73SQ M
GLUCOSE SERPL-MCNC: 109 MG/DL (ref 65–140)
GLUCOSE SERPL-MCNC: 122 MG/DL (ref 65–140)
GLUCOSE SERPL-MCNC: 149 MG/DL (ref 65–140)
GLUCOSE SERPL-MCNC: 192 MG/DL (ref 65–140)
GLUCOSE SERPL-MCNC: 199 MG/DL (ref 65–140)
GLUCOSE SERPL-MCNC: 88 MG/DL (ref 65–140)
GLUCOSE UR STRIP-MCNC: NEGATIVE MG/DL
HBA1C MFR BLD: 5.3 %
HCT VFR BLD AUTO: 41.8 % (ref 36.5–49.3)
HGB BLD-MCNC: 12.9 G/DL (ref 12–17)
HGB UR QL STRIP.AUTO: NEGATIVE
INTERVENTRICULAR SEPTUM IN DIASTOLE (PARASTERNAL SHORT AXIS VIEW): 1.2 CM
INTERVENTRICULAR SEPTUM: 1.2 CM (ref 0.6–1.1)
KETONES UR STRIP-MCNC: NEGATIVE MG/DL
LAAS-AP2: 24.6 CM2
LAAS-AP4: 20.4 CM2
LEFT ATRIUM AREA SYSTOLE SINGLE PLANE A4C: 18.5 CM2
LEFT ATRIUM SIZE: 3.9 CM
LEFT INTERNAL DIMENSION IN SYSTOLE: 2.9 CM (ref 2.1–4)
LEFT VENTRICULAR INTERNAL DIMENSION IN DIASTOLE: 4.5 CM (ref 3.5–6)
LEFT VENTRICULAR POSTERIOR WALL IN END DIASTOLE: 1.1 CM
LEFT VENTRICULAR STROKE VOLUME: 58 ML
LEUKOCYTE ESTERASE UR QL STRIP: NEGATIVE
LVSV (TEICH): 58 ML
MAGNESIUM SERPL-MCNC: 2.3 MG/DL (ref 1.6–2.6)
MCH RBC QN AUTO: 27 PG (ref 26.8–34.3)
MCHC RBC AUTO-ENTMCNC: 30.9 G/DL (ref 31.4–37.4)
MCV RBC AUTO: 87 FL (ref 82–98)
MV E'TISSUE VEL-SEP: 6 CM/S
MV PEAK A VEL: 0.58 M/S
MV PEAK E VEL: 92 CM/S
MV STENOSIS PRESSURE HALF TIME: 53 MS
MV VALVE AREA P 1/2 METHOD: 4.15 CM2
NITRITE UR QL STRIP: NEGATIVE
NON-SQ EPI CELLS URNS QL MICRO: ABNORMAL /HPF
P AXIS: 31 DEGREES
P AXIS: 49 DEGREES
PH UR STRIP.AUTO: 5.5 [PH]
PLATELET # BLD AUTO: 1251 THOUSANDS/UL (ref 149–390)
PMV BLD AUTO: 10.6 FL (ref 8.9–12.7)
POTASSIUM SERPL-SCNC: 3.7 MMOL/L (ref 3.5–5.3)
PR INTERVAL: 142 MS
PR INTERVAL: 150 MS
PROT SERPL-MCNC: 7.7 G/DL (ref 6.4–8.2)
PROT UR STRIP-MCNC: ABNORMAL MG/DL
QRS AXIS: -15 DEGREES
QRS AXIS: 21 DEGREES
QRSD INTERVAL: 92 MS
QRSD INTERVAL: 94 MS
QT INTERVAL: 346 MS
QT INTERVAL: 380 MS
QTC INTERVAL: 467 MS
QTC INTERVAL: 491 MS
RBC # BLD AUTO: 4.78 MILLION/UL (ref 3.88–5.62)
RBC #/AREA URNS AUTO: ABNORMAL /HPF
RIGHT ATRIUM AREA SYSTOLE A4C: 16.6 CM2
RIGHT VENTRICLE ID DIMENSION: 3.5 CM
SL CV LEFT ATRIUM LENGTH A2C: 6.9 CM
SL CV LV EF: 45
SL CV PED ECHO LEFT VENTRICLE DIASTOLIC VOLUME (MOD BIPLANE) 2D: 91 ML
SL CV PED ECHO LEFT VENTRICLE SYSTOLIC VOLUME (MOD BIPLANE) 2D: 34 ML
SODIUM SERPL-SCNC: 142 MMOL/L (ref 136–145)
SP GR UR STRIP.AUTO: 1.02 (ref 1–1.03)
T WAVE AXIS: 107 DEGREES
T WAVE AXIS: 82 DEGREES
TRICUSPID ANNULAR PLANE SYSTOLIC EXCURSION: 2.1 CM
UROBILINOGEN UR QL STRIP.AUTO: 0.2 E.U./DL
VENTRICULAR RATE: 121 BPM
VENTRICULAR RATE: 91 BPM
WBC # BLD AUTO: 14.4 THOUSAND/UL (ref 4.31–10.16)
WBC #/AREA URNS AUTO: ABNORMAL /HPF

## 2022-05-23 PROCEDURE — 93010 ELECTROCARDIOGRAM REPORT: CPT | Performed by: INTERNAL MEDICINE

## 2022-05-23 PROCEDURE — 82948 REAGENT STRIP/BLOOD GLUCOSE: CPT

## 2022-05-23 PROCEDURE — 81279 JAK2 GENE TRGT SEQUENCE ALYS: CPT | Performed by: HOSPITALIST

## 2022-05-23 PROCEDURE — 99223 1ST HOSP IP/OBS HIGH 75: CPT | Performed by: INTERNAL MEDICINE

## 2022-05-23 PROCEDURE — 85027 COMPLETE CBC AUTOMATED: CPT | Performed by: STUDENT IN AN ORGANIZED HEALTH CARE EDUCATION/TRAINING PROGRAM

## 2022-05-23 PROCEDURE — 84484 ASSAY OF TROPONIN QUANT: CPT | Performed by: PHYSICIAN ASSISTANT

## 2022-05-23 PROCEDURE — 83735 ASSAY OF MAGNESIUM: CPT | Performed by: STUDENT IN AN ORGANIZED HEALTH CARE EDUCATION/TRAINING PROGRAM

## 2022-05-23 PROCEDURE — 85730 THROMBOPLASTIN TIME PARTIAL: CPT | Performed by: HOSPITALIST

## 2022-05-23 PROCEDURE — 99232 SBSQ HOSP IP/OBS MODERATE 35: CPT | Performed by: HOSPITALIST

## 2022-05-23 PROCEDURE — C8929 TTE W OR WO FOL WCON,DOPPLER: HCPCS

## 2022-05-23 PROCEDURE — 93306 TTE W/DOPPLER COMPLETE: CPT | Performed by: INTERNAL MEDICINE

## 2022-05-23 PROCEDURE — 80053 COMPREHEN METABOLIC PANEL: CPT | Performed by: STUDENT IN AN ORGANIZED HEALTH CARE EDUCATION/TRAINING PROGRAM

## 2022-05-23 PROCEDURE — 93005 ELECTROCARDIOGRAM TRACING: CPT

## 2022-05-23 PROCEDURE — 81001 URINALYSIS AUTO W/SCOPE: CPT | Performed by: PHYSICIAN ASSISTANT

## 2022-05-23 PROCEDURE — 99222 1ST HOSP IP/OBS MODERATE 55: CPT | Performed by: INTERNAL MEDICINE

## 2022-05-23 PROCEDURE — 85730 THROMBOPLASTIN TIME PARTIAL: CPT | Performed by: PHYSICIAN ASSISTANT

## 2022-05-23 PROCEDURE — 85730 THROMBOPLASTIN TIME PARTIAL: CPT | Performed by: STUDENT IN AN ORGANIZED HEALTH CARE EDUCATION/TRAINING PROGRAM

## 2022-05-23 RX ORDER — BACLOFEN 10 MG/1
10 TABLET ORAL EVERY 6 HOURS PRN
Status: DISCONTINUED | OUTPATIENT
Start: 2022-05-23 | End: 2022-05-26 | Stop reason: HOSPADM

## 2022-05-23 RX ORDER — HEPARIN SODIUM 10000 [USP'U]/100ML
3-20 INJECTION, SOLUTION INTRAVENOUS
Status: DISCONTINUED | OUTPATIENT
Start: 2022-05-23 | End: 2022-05-25

## 2022-05-23 RX ORDER — MORPHINE SULFATE 20 MG/5ML
5 SOLUTION ORAL EVERY 2 HOUR PRN
COMMUNITY
End: 2022-05-26

## 2022-05-23 RX ORDER — ROPINIROLE 0.25 MG/1
0.5 TABLET, FILM COATED ORAL
Status: DISCONTINUED | OUTPATIENT
Start: 2022-05-23 | End: 2022-05-26 | Stop reason: HOSPADM

## 2022-05-23 RX ORDER — CLOPIDOGREL BISULFATE 75 MG/1
75 TABLET ORAL DAILY
Status: DISCONTINUED | OUTPATIENT
Start: 2022-05-23 | End: 2022-05-26 | Stop reason: HOSPADM

## 2022-05-23 RX ORDER — HYDROXYUREA 500 MG/1
500 CAPSULE ORAL 3 TIMES DAILY
Status: DISCONTINUED | OUTPATIENT
Start: 2022-05-23 | End: 2022-05-25

## 2022-05-23 RX ORDER — INSULIN LISPRO 100 [IU]/ML
5 INJECTION, SOLUTION INTRAVENOUS; SUBCUTANEOUS
Status: DISCONTINUED | OUTPATIENT
Start: 2022-05-23 | End: 2022-05-26 | Stop reason: HOSPADM

## 2022-05-23 RX ORDER — LORATADINE 10 MG/1
10 TABLET ORAL DAILY
Status: DISCONTINUED | OUTPATIENT
Start: 2022-05-23 | End: 2022-05-26 | Stop reason: HOSPADM

## 2022-05-23 RX ORDER — ASPIRIN 81 MG/1
81 TABLET ORAL DAILY
Status: DISCONTINUED | OUTPATIENT
Start: 2022-05-23 | End: 2022-05-26 | Stop reason: HOSPADM

## 2022-05-23 RX ORDER — HEPARIN SODIUM 5000 [USP'U]/ML
5000 INJECTION, SOLUTION INTRAVENOUS; SUBCUTANEOUS EVERY 8 HOURS SCHEDULED
Status: DISCONTINUED | OUTPATIENT
Start: 2022-05-23 | End: 2022-05-23

## 2022-05-23 RX ORDER — CHOLESTYRAMINE LIGHT 4 G/5.7G
4 POWDER, FOR SUSPENSION ORAL 2 TIMES DAILY
Status: DISCONTINUED | OUTPATIENT
Start: 2022-05-23 | End: 2022-05-26 | Stop reason: HOSPADM

## 2022-05-23 RX ORDER — INSULIN LISPRO 100 [IU]/ML
10 INJECTION, SOLUTION INTRAVENOUS; SUBCUTANEOUS
COMMUNITY
End: 2022-05-26

## 2022-05-23 RX ORDER — GABAPENTIN 300 MG/1
300 CAPSULE ORAL 2 TIMES DAILY
Status: DISCONTINUED | OUTPATIENT
Start: 2022-05-23 | End: 2022-05-26 | Stop reason: HOSPADM

## 2022-05-23 RX ORDER — INSULIN LISPRO 100 [IU]/ML
1-6 INJECTION, SOLUTION INTRAVENOUS; SUBCUTANEOUS
Status: DISCONTINUED | OUTPATIENT
Start: 2022-05-23 | End: 2022-05-26 | Stop reason: HOSPADM

## 2022-05-23 RX ORDER — HEPARIN SODIUM 1000 [USP'U]/ML
2000 INJECTION, SOLUTION INTRAVENOUS; SUBCUTANEOUS
Status: DISCONTINUED | OUTPATIENT
Start: 2022-05-23 | End: 2022-05-25

## 2022-05-23 RX ORDER — PANTOPRAZOLE SODIUM 40 MG/1
40 TABLET, DELAYED RELEASE ORAL
Status: DISCONTINUED | OUTPATIENT
Start: 2022-05-23 | End: 2022-05-26 | Stop reason: HOSPADM

## 2022-05-23 RX ORDER — INSULIN LISPRO 100 [IU]/ML
1-5 INJECTION, SOLUTION INTRAVENOUS; SUBCUTANEOUS
Status: DISCONTINUED | OUTPATIENT
Start: 2022-05-23 | End: 2022-05-26 | Stop reason: HOSPADM

## 2022-05-23 RX ORDER — HEPARIN SODIUM 1000 [USP'U]/ML
4000 INJECTION, SOLUTION INTRAVENOUS; SUBCUTANEOUS
Status: DISCONTINUED | OUTPATIENT
Start: 2022-05-23 | End: 2022-05-25

## 2022-05-23 RX ORDER — INSULIN GLARGINE 100 [IU]/ML
35 INJECTION, SOLUTION SUBCUTANEOUS
COMMUNITY
End: 2022-05-26

## 2022-05-23 RX ORDER — ATORVASTATIN CALCIUM 40 MG/1
40 TABLET, FILM COATED ORAL
Status: DISCONTINUED | OUTPATIENT
Start: 2022-05-23 | End: 2022-05-26 | Stop reason: HOSPADM

## 2022-05-23 RX ORDER — GABAPENTIN 300 MG/1
600 CAPSULE ORAL
Status: DISCONTINUED | OUTPATIENT
Start: 2022-05-23 | End: 2022-05-26 | Stop reason: HOSPADM

## 2022-05-23 RX ORDER — FUROSEMIDE 10 MG/ML
40 INJECTION INTRAMUSCULAR; INTRAVENOUS
Status: DISCONTINUED | OUTPATIENT
Start: 2022-05-23 | End: 2022-05-25

## 2022-05-23 RX ORDER — TAMSULOSIN HYDROCHLORIDE 0.4 MG/1
0.4 CAPSULE ORAL
Status: DISCONTINUED | OUTPATIENT
Start: 2022-05-23 | End: 2022-05-26 | Stop reason: HOSPADM

## 2022-05-23 RX ORDER — INSULIN GLARGINE 100 [IU]/ML
20 INJECTION, SOLUTION SUBCUTANEOUS
Status: DISCONTINUED | OUTPATIENT
Start: 2022-05-23 | End: 2022-05-26 | Stop reason: HOSPADM

## 2022-05-23 RX ADMIN — HEPARIN SODIUM 2000 UNITS: 1000 INJECTION INTRAVENOUS; SUBCUTANEOUS at 19:47

## 2022-05-23 RX ADMIN — ROPINIROLE HYDROCHLORIDE 0.5 MG: 0.25 TABLET, FILM COATED ORAL at 21:51

## 2022-05-23 RX ADMIN — LORATADINE 10 MG: 10 TABLET ORAL at 15:42

## 2022-05-23 RX ADMIN — INSULIN LISPRO 1 UNITS: 100 INJECTION, SOLUTION INTRAVENOUS; SUBCUTANEOUS at 01:10

## 2022-05-23 RX ADMIN — HEPARIN SODIUM 2000 UNITS: 1000 INJECTION INTRAVENOUS; SUBCUTANEOUS at 11:40

## 2022-05-23 RX ADMIN — ROPINIROLE HYDROCHLORIDE 0.5 MG: 0.25 TABLET, FILM COATED ORAL at 01:06

## 2022-05-23 RX ADMIN — PERFLUTREN 0.8 ML/MIN: 6.52 INJECTION, SUSPENSION INTRAVENOUS at 11:25

## 2022-05-23 RX ADMIN — HEPARIN SODIUM 11.1 UNITS/KG/HR: 10000 INJECTION, SOLUTION INTRAVENOUS at 02:37

## 2022-05-23 RX ADMIN — ATORVASTATIN CALCIUM 40 MG: 40 TABLET, FILM COATED ORAL at 15:40

## 2022-05-23 RX ADMIN — CLOPIDOGREL BISULFATE 75 MG: 75 TABLET ORAL at 08:51

## 2022-05-23 RX ADMIN — PANTOPRAZOLE SODIUM 40 MG: 40 TABLET, DELAYED RELEASE ORAL at 08:54

## 2022-05-23 RX ADMIN — HYDROXYUREA 500 MG: 500 CAPSULE ORAL at 15:40

## 2022-05-23 RX ADMIN — CHOLESTYRAMINE 4 G: 4 POWDER, FOR SUSPENSION ORAL at 08:51

## 2022-05-23 RX ADMIN — HYDROXYUREA 500 MG: 500 CAPSULE ORAL at 21:51

## 2022-05-23 RX ADMIN — GABAPENTIN 300 MG: 300 CAPSULE ORAL at 08:52

## 2022-05-23 RX ADMIN — INSULIN LISPRO 5 UNITS: 100 INJECTION, SOLUTION INTRAVENOUS; SUBCUTANEOUS at 11:33

## 2022-05-23 RX ADMIN — HYDROXYUREA 500 MG: 500 CAPSULE ORAL at 08:53

## 2022-05-23 RX ADMIN — CHOLESTYRAMINE 4 G: 4 POWDER, FOR SUSPENSION ORAL at 17:13

## 2022-05-23 RX ADMIN — FUROSEMIDE 40 MG: 10 INJECTION, SOLUTION INTRAMUSCULAR; INTRAVENOUS at 08:52

## 2022-05-23 RX ADMIN — INSULIN LISPRO 5 UNITS: 100 INJECTION, SOLUTION INTRAVENOUS; SUBCUTANEOUS at 07:44

## 2022-05-23 RX ADMIN — TAMSULOSIN HYDROCHLORIDE 0.4 MG: 0.4 CAPSULE ORAL at 15:40

## 2022-05-23 RX ADMIN — ASPIRIN 81 MG: 81 TABLET, COATED ORAL at 08:53

## 2022-05-23 RX ADMIN — FUROSEMIDE 40 MG: 10 INJECTION, SOLUTION INTRAMUSCULAR; INTRAVENOUS at 15:40

## 2022-05-23 RX ADMIN — INSULIN GLARGINE 20 UNITS: 100 INJECTION, SOLUTION SUBCUTANEOUS at 22:02

## 2022-05-23 RX ADMIN — INSULIN LISPRO 5 UNITS: 100 INJECTION, SOLUTION INTRAVENOUS; SUBCUTANEOUS at 16:21

## 2022-05-23 RX ADMIN — GABAPENTIN 600 MG: 300 CAPSULE ORAL at 21:51

## 2022-05-23 RX ADMIN — INSULIN GLARGINE 20 UNITS: 100 INJECTION, SOLUTION SUBCUTANEOUS at 01:07

## 2022-05-23 RX ADMIN — GABAPENTIN 600 MG: 300 CAPSULE ORAL at 01:07

## 2022-05-23 RX ADMIN — GABAPENTIN 300 MG: 300 CAPSULE ORAL at 17:13

## 2022-05-23 NOTE — ASSESSMENT & PLAN NOTE
· Dyspnea x2 hours at facility with then increased work of breathing with BP of 198/104 at facility prompting ED evaluation   · Note - patient has been on continuous supplemental oxygen via nasal cannula over the last week due to dyspnea at facility and had been ordered morphine q 2 hours p r n  for dyspnea at facility  · Last echo 2017 showed "LVEF 55%  Possible mid to apical anteroseptal and inferior wall hypokinesis  Mild diastolic dysfunction "   · No home diuretics but documented hx of HF on facility papers    · CXR with pulmonary edema on my review   ·   · Placed on BiPAP initially and then weaned to 4L NC once there was decrease in work of breathing   · Given lasix 40mg IV - UOP at 450cc at 3 hours (suspect closer to 600 as pt had wet brief that was unable to be recorded)   · Lasix 40 IV BID - will cont   · Update echo  · Cardiology consult appreciated     · Strict I/O and daily weights   · Fluid and sodium restriction   · Admit to SD2 due to initially requiring BiPAP

## 2022-05-23 NOTE — ED PROVIDER NOTES
History  Chief Complaint   Patient presents with    Shortness of Breath     EMS from [facility]; SOB x3hrs     19-year-old man with history of multiple sclerosis, atrial fibrillation, diabetes type 2, peripheral vascular disease, CHF and hypertension is brought from nursing home due to sudden acute shortness of breath with increased work of breathing and discomfort both arms the past 2 hours  Patient received a DuoNeb on the way over  He received morphine at around 7:30 p m  and states he is feeling improved  He believes that in the past they felt the bilateral arm symptoms may have been due to his MS  Patient is not aware that he has history of CHF or atrial fibrillation  Denies recent fever but has had nasal congestion  Endorses recent diarrhea and concern for C             Prior to Admission Medications   Prescriptions Last Dose Informant Patient Reported? Taking? Cholecalciferol (Vitamin D3) 1 25 MG (07525 UT) CAPS 5/22/2022 at Unknown time Outside Facility (Specify) Yes Yes   Sig: Take by mouth   EMOLLIENT CREAM & WOUND GEL EX  Outside Facility (Specify) Yes No   Sig: Apply topically   Glucagon, rDNA, (Glucagon Emergency) 1 MG KIT  Outside Facility (Specify) Yes No   Sig: Inject as directed   Probiotic Product (PROBIOTIC PO)  Outside Facility (Specify) Yes No   Sig: Take by mouth   acetaminophen (TYLENOL) 325 mg tablet Past Week at Unknown time Outside Facility (Specify) Yes Yes   Sig: Take 650 mg by mouth every 6 (six) hours as needed for mild pain   aspirin (ECOTRIN LOW STRENGTH) 81 mg EC tablet 5/22/2022 at Unknown time Outside Facility (Specify) Yes Yes   Sig: Take 81 mg by mouth in the morning     baclofen 10 mg tablet 5/22/2022 at Unknown time Outside Facility ([facility]) Yes Yes   Sig: Take 10 mg by mouth every 6 (six) hours as needed for muscle spasms   bisacodyl (Dulcolax) 10 mg suppository  Outside Facility (Specify) Yes No   Sig: Insert 10 mg into the rectum daily   cholestyramine sugar free (QUESTRAN LIGHT) 4 g packet 5/22/2022 at Unknown time Outside Facility ([address] [place] [address]) Yes Yes   Sig: Take 4 g by mouth 2 (two) times a day   clopidogrel (PLAVIX) 75 mg tablet 5/22/2022 at Unknown time Outside Facility ([address] [place] [address]) Yes Yes   Sig: Take 75 mg by mouth daily   gabapentin (NEURONTIN) 300 mg capsule 5/22/2022 at Unknown time Outside Facility ([address] [place] [address]) Yes Yes   Sig: Take 300 mg by mouth in the morning and 300 mg in the evening    gabapentin (NEURONTIN) 600 MG tablet 5/22/2022 at Unknown time Outside Facility ([address] [place] [address]) Yes Yes   Sig: Take 600 mg by mouth daily at bedtime   insulin glargine (LANTUS) 100 units/mL subcutaneous injection Past Week at Unknown time  Yes Yes   Sig: Inject 35 Units under the skin daily at bedtime   insulin lispro (HumaLOG) 100 units/mL injection 5/22/2022 at Unknown time Outside Facility (Specify) Yes Yes   Sig: Inject 12 Units under the skin in the morning and 12 Units in the evening     insulin lispro (insulin lispro) 100 units/mL injection Past Week at Unknown time  Yes Yes   Sig: Inject 10 Units under the skin daily at bedtime   loperamide (IMODIUM) 2 mg capsule 5/22/2022 at Unknown time Outside Facility (Specify) Yes Yes   Sig: Take 2 mg by mouth as needed in the morning and 2 mg as needed in the evening for diarrhea    loratadine (CLARITIN) 10 mg tablet 5/22/2022 at Unknown time Outside Facility ([address] [place] [address]) Yes Yes   Sig: Take 10 mg by mouth daily   magnesium hydroxide (MILK OF MAGNESIA) 400 mg/5 mL oral suspension  Outside Facility (Specify) Yes No   Sig: Take by mouth daily as needed for constipation   morphine 20 MG/5ML solution 5/22/2022 at Unknown time  Yes Yes   Sig: Take 5 mg by mouth every 2 (two) hours as needed for severe pain (dyspnea)   pantoprazole (PROTONIX) 40 mg tablet 5/22/2022 at Unknown time Outside Facility (Specify) Yes Yes   Sig: Take 40 mg by mouth daily   polyethylene glycol (GOLYTELY) 4000 mL solution   No No   Sig: Take 4,000 mL by mouth once for 1 dose polyethylene glycol-propylene glycol (Systane) 0 4-0 3 % 5/22/2022 at Unknown time Outside Facility ([address] [place] [address]) Yes Yes   Sig: every 3 (three) hours as needed   rOPINIRole (REQUIP) 0 5 mg tablet 5/22/2022 at Unknown time Outside Facility ([address] [place] [address]) Yes Yes   Sig: Take 0 5 mg by mouth daily at bedtime   sodium phosphate-biphosphate (FLEET) 7-19 g 118 mL enema  Outside Facility (Specify) Yes No   Sig: Insert 1 enema into the rectum   tamsulosin (FLOMAX) 0 4 mg 5/22/2022 at Unknown time Outside Facility ([address] [place] [address]) Yes Yes   Sig: Take 0 4 mg by mouth daily with dinner   white petrolatum-corn starch-lanolin (TRIPLE PASTE) 12 8 % ointment  Outside Facility (Specify) Yes No   Sig: Apply topically as needed for irritation      Facility-Administered Medications: None       Past Medical History:   Diagnosis Date    Atrial fibrillation (HCC)     BPH (benign prostatic hyperplasia)     [facility]) ([facility])     Diabetes mellitus ([facility] )     Hyperlipidemia     Hypertension     Multiple sclerosis (HCC)     Neuropathy     RLS (restless legs syndrome)        History reviewed  No pertinent surgical history  History reviewed  No pertinent family history  I have reviewed and agree with the history as documented  E-Cigarette/Vaping    E-Cigarette Use Never User      E-Cigarette/Vaping Substances     Social History     Tobacco Use    Smoking status: Never Smoker    Smokeless tobacco: Never Used   Vaping Use    Vaping Use: Never used   Substance Use Topics    Alcohol use: Not Currently    Drug use: Never       Review of Systems   Constitutional: Positive for fatigue  Negative for fever  HENT: Positive for congestion and rhinorrhea  Negative for postnasal drip and sore throat  Respiratory: Positive for cough and shortness of breath  Cardiovascular: Negative for chest pain, palpitations and leg swelling  Gastrointestinal: Positive for diarrhea (Being worked up for diarrhea)   Negative for abdominal pain, nausea and vomiting  Genitourinary: Negative for dysuria and flank pain  Musculoskeletal: Positive for myalgias  Skin: Negative for rash  Neurological: Positive for weakness and numbness  Negative for seizures and headaches  All other systems reviewed and are negative  Physical Exam  Physical Exam  Vitals and nursing note reviewed  Constitutional:       General: He is in acute distress  Appearance: He is obese  He is ill-appearing  He is not diaphoretic  HENT:      Head: Normocephalic and atraumatic  Mouth/Throat:      Mouth: Mucous membranes are moist       Pharynx: Oropharynx is clear  Eyes:      Extraocular Movements: Extraocular movements intact  Neck:      Vascular: JVD: unclear d/t habitus  Cardiovascular:      Rate and Rhythm: Tachycardia present  Rhythm irregular  Pulses: Normal pulses  Pulmonary:      Effort: Tachypnea, accessory muscle usage and respiratory distress present  Breath sounds: Examination of the right-upper field reveals rhonchi  Examination of the left-upper field reveals rhonchi  Examination of the right-middle field reveals rhonchi and rales  Examination of the left-middle field reveals rhonchi and rales  Examination of the right-lower field reveals rhonchi and rales  Examination of the left-lower field reveals rhonchi and rales  Rhonchi and rales present  Chest:      Chest wall: No deformity or crepitus  Abdominal:      General: Bowel sounds are normal       Palpations: Abdomen is soft  Tenderness: There is no abdominal tenderness  Musculoskeletal:      Cervical back: Neck supple  Right lower leg: No tenderness  Edema present  Left lower leg: No tenderness  Edema present  Skin:     General: Skin is warm and dry  Capillary Refill: Capillary refill takes less than 2 seconds  Neurological:      General: No focal deficit present  Mental Status: He is alert and oriented to person, place, and time  Motor: Weakness present     Psychiatric:         Mood and Affect: Mood normal          Behavior: Behavior normal          Vital Signs  ED Triage Vitals [05/22/22 2132]   Temperature Pulse Respirations Blood Pressure SpO2   97 9 °F (36 6 °C) (!) 120 20 157/69 90 %      Temp Source Heart Rate Source Patient Position - Orthostatic VS BP Location FiO2 (%)   Temporal Monitor Lying Left arm --      Pain Score       No Pain           Vitals:    05/25/22 1115 05/25/22 1453 05/25/22 1549 05/25/22 1749   BP: 113/59 122/65     Pulse: 81 81 76 82   Patient Position - Orthostatic VS: Lying Sitting           Visual Acuity  Visual Acuity    Flowsheet Row Most Recent Value   L Pupil Size (mm) 3   R Pupil Size (mm) 3   L Pupil Shape Round   R Pupil Shape Round          ED Medications  Medications   aspirin (ECOTRIN LOW STRENGTH) EC tablet 81 mg (81 mg Oral Given 5/25/22 0802)   baclofen tablet 10 mg (has no administration in time range)   cholestyramine sugar free (QUESTRAN LIGHT) packet 4 g (4 g Oral Given 5/25/22 1716)   clopidogrel (PLAVIX) tablet 75 mg (75 mg Oral Given 5/25/22 0802)   gabapentin (NEURONTIN) capsule 300 mg (300 mg Oral Given 5/25/22 1716)   gabapentin (NEURONTIN) capsule 600 mg (600 mg Oral Given 5/24/22 2103)   loratadine (CLARITIN) tablet 10 mg (10 mg Oral Given 5/25/22 0802)   pantoprazole (PROTONIX) EC tablet 40 mg (40 mg Oral Given 5/25/22 0802)   rOPINIRole (REQUIP) tablet 0 5 mg (0 5 mg Oral Given 5/24/22 2103)   tamsulosin (FLOMAX) capsule 0 4 mg (0 4 mg Oral Given 5/25/22 1627)   insulin glargine (LANTUS) subcutaneous injection 20 Units 0 2 mL (20 Units Subcutaneous Given 5/24/22 2102)   insulin lispro (HumaLOG) 100 units/mL subcutaneous injection 5 Units (5 Units Subcutaneous Given 5/25/22 1626)   insulin lispro (HumaLOG) 100 units/mL subcutaneous injection 1-6 Units (1 Units Subcutaneous Not Given 5/25/22 1626)   insulin lispro (HumaLOG) 100 units/mL subcutaneous injection 1-5 Units (1 Units Subcutaneous Not Given 5/24/22 2101)   atorvastatin (LIPITOR) tablet 40 mg (40 mg Oral Given 5/25/22 1627)   potassium chloride (K-DUR,KLOR-CON) CR tablet 40 mEq (40 mEq Oral Given 5/25/22 0802)   metoprolol succinate (TOPROL-XL) 24 hr tablet 25 mg (25 mg Oral Given 5/25/22 0802)   hydroxyurea (HYDREA) capsule 1,000 mg (has no administration in time range)   furosemide (LASIX) injection 40 mg (40 mg Intravenous Given 5/22/22 2159)   perflutren lipid microsphere (DEFINITY) injection (0 8 mL/min Intravenous Given 5/23/22 1125)   potassium chloride (K-DUR,KLOR-CON) CR tablet 40 mEq (40 mEq Oral Given 5/24/22 0612)       Diagnostic Studies  Results Reviewed     Procedure Component Value Units Date/Time    Hemoglobin A1c w/EAG Estimation (Orders if not completed within the last 90 days) [[id number]] Collected: 05/22/22 2231    Lab Status: Final result Specimen: Blood from Arm, Left Updated: 05/23/22 0533     Hemoglobin A1C 5 3 %       mg/dl     HS Troponin I 4hr [[id number]]  (Abnormal) Collected: 05/23/22 0130    Lab Status: Final result Specimen: Blood from Arm, Left Updated: 05/23/22 0205     hs TnI 4hr 1,090 ng/L      Delta 4hr hsTnI 642 ng/L     HS Troponin I 2hr [[id number]]  (Abnormal) Collected: 05/22/22 2352    Lab Status: Final result Specimen: Blood from Hand, Left Updated: 05/23/22 0040     hs TnI 2hr 663 ng/L      Delta 2hr hsTnI 215 ng/L     COVID/FLU/RSV [[id number]]  (Normal) Collected: 05/22/22 2202    Lab Status: Final result Specimen: Nares from Nasopharyngeal Swab Updated: 05/22/22 2307     SARS-CoV-2 Negative     INFLUENZA A PCR Negative     INFLUENZA B PCR Negative     RSV PCR Negative    Narrative:      FOR PEDIATRIC PATIENTS - copy/paste COVID Guidelines URL to browser: [email or web address]    SARS-CoV-2 assay is a Nucleic Acid Amplification assay intended for the  qualitative detection of nucleic acid from SARS-CoV-2 in nasopharyngeal  swabs  Results are for the presumptive identification of SARS-CoV-2 RNA  Positive results are indicative of infection with SARS-CoV-2, the virus  causing COVID-19, but do not rule out bacterial infection or co-infection  with other viruses  Laboratories within the [place] and its  territories are required to report all positive results to the appropriate  public health authorities  Negative results do not preclude SARS-CoV-2  infection and should not be used as the sole basis for treatment or other  patient management decisions  Negative results must be combined with  clinical observations, patient history, and epidemiological information  This test has not been FDA cleared or approved  This test has been authorized by FDA under an Emergency Use Authorization  (EUA)  This test is only authorized for the duration of time the  declaration that circumstances exist justifying the authorization of the  emergency use of an in vitro diagnostic tests for detection of SARS-CoV-2  virus and/or diagnosis of COVID-19 infection under section 564(b)(1) of  the Act, 21 U  S C  735HRT-6(F)(3), unless the authorization is terminated  or revoked sooner  The test has been validated but independent review by FDA  and CLIA is pending  Test performed using [employer] Gene[employer]pert: This RT-PCR assay targets N2,  a region unique to SARS-CoV-2  A conserved region in the E-gene was chosen  for pan-Sarbecovirus detection which includes SARS-CoV-2      CBC and differential [[id number]]  (Abnormal) Collected: 05/22/22 2231    Lab Status: Final result Specimen: Blood from Arm, Left Updated: 05/22/22 2250     WBC 14 28 Thousand/uL      RBC 4 76 Million/uL      Hemoglobin 12 6 g/dL      Hematocrit 41 5 %      MCV 87 fL      MCH 26 5 pg      MCHC 30 4 g/dL      RDW 19 7 %      MPV 10 8 fL      Platelets 5,799 Thousands/uL      nRBC 0 /100 WBCs      Neutrophils Relative 82 %      Immat GRANS % 1 %      Lymphocytes Relative 6 % Monocytes Relative 7 %      Eosinophils Relative 3 %      Basophils Relative 1 %      Neutrophils Absolute 11 85 Thousands/µL      Immature Grans Absolute 0 08 Thousand/uL      Lymphocytes Absolute 0 86 Thousands/µL      Monocytes Absolute 1 00 Thousand/µL      Eosinophils Absolute 0 37 Thousand/µL      Basophils Absolute 0 12 Thousands/µL     Comprehensive metabolic panel [[id number]]  (Abnormal) Collected: 05/22/22 2144    Lab Status: Final result Specimen: Blood from Arm, Left Updated: 05/22/22 2243     Sodium 138 mmol/L      Potassium 3 6 mmol/L      Chloride 107 mmol/L      CO2 24 mmol/L      ANION GAP 7 mmol/L      BUN 21 mg/dL      Creatinine 1 18 mg/dL      Glucose 239 mg/dL      Calcium 8 4 mg/dL      Corrected Calcium 9 0 mg/dL      AST 28 U/L      ALT 12 U/L      Alkaline Phosphatase 121 U/L      Total Protein 7 4 g/dL      Albumin 3 3 g/dL      Total Bilirubin 0 40 mg/dL      eGFR 61 ml/min/1 73sq m     Narrative:      [place] guidelines for Chronic Kidney Disease (CKD):     Stage 1 with normal or high GFR (GFR > 90 mL/min/1 73 square meters)    Stage 2 Mild CKD (GFR = 60-89 mL/min/1 73 square meters)    Stage 3A Moderate CKD (GFR = 45-59 mL/min/1 73 square meters)    Stage 3B Moderate CKD (GFR = 30-44 mL/min/1 73 square meters)    Stage 4 Severe CKD (GFR = 15-29 mL/min/1 73 square meters)    Stage 5 End Stage CKD (GFR <15 mL/min/1 73 square meters)  Note: GFR calculation is accurate only with a steady state creatinine    NT-BNP PRO [[id number]]  (Abnormal) Collected: 05/22/22 2144    Lab Status: Final result Specimen: Blood from Arm, Left Updated: 05/22/22 2243     NT-proBNP 389 pg/mL     HS Troponin 0hr (reflex protocol) [[id number]]  (Abnormal) Collected: 05/22/22 2144    Lab Status: Final result Specimen: Blood from Arm, Left Updated: 05/22/22 2229     hs TnI 0hr 448 ng/L     APTT [[id number]]  (Normal) Collected: 05/22/22 2144    Lab Status: Final result Specimen: Blood from Arm, Left Updated: 05/22/22 2215     PTT 37 seconds     Protime-INR [[id number]]  (Normal) Collected: 05/22/22 2144    Lab Status: Final result Specimen: Blood from Arm, Left Updated: 05/22/22 2215     Protime 14 4 seconds      INR 1 13    POCT Blood Gas (CG8+) [[id number]]  (Abnormal) Collected: 05/22/22 2150    Lab Status: Final result Specimen: Venous Updated: 05/22/22 2155     ph, Joe ISTAT 7 381     pCO2, Joe i-STAT 38 1 mm HG      pO2, Joe i-STAT 71 0 mm HG      BE, i-STAT -2 mmol/L      HCO3, Joe i-STAT 22 6 mmol/L      CO2, i-STAT 24 mmol/L      O2 Sat, i-STAT 94 %      SODIUM, I-STAT 140 mmol/l      Potassium, i-STAT 3 8 mmol/L      Calcium, Ionized i-STAT 1 11 mmol/L      Hct, i-STAT 41 %      Hgb, i-STAT 13 9 g/dl      Glucose, i-STAT 249 mg/dl      Specimen Type VENOUS                 X-ray chest 1 view portable   Final Result by Linda Mahan MD (05/23 0715)      Congestive heart failure              Workstation performed: [id number]                    Procedures  ECG 12 Lead Documentation Only    Date/Time: 5/22/2022 9:34 PM  Performed by: Marlys Rizzo DO  Authorized by: Marlys Rizzo DO     ECG reviewed by me, the ED Provider: yes    Patient location:  ED  Previous ECG:     Previous ECG:  Unavailable    Comparison to cardiac monitor: Yes    Rhythm:     Rhythm: sinus tachycardia    Ectopy:     Ectopy: none    QRS:     QRS axis:  Normal    QRS intervals:  Normal  Conduction:     Conduction: normal    ST segments:     ST segments:  Non-specific  T waves:     T waves: normal    Q waves:     Q waves:  III, aVF, V2 and V3    CriticalCare Time  Performed by: Marlys Rizzo DO  Authorized by: Marlys Rizzo DO     Critical care provider statement:     Critical care time (minutes):  50    Critical care time was exclusive of:  Separately billable procedures and treating other patients and teaching time    Critical care was necessary to treat or prevent imminent or life-threatening deterioration of the following conditions:  Cardiac failure    Critical care was time spent personally by me on the following activities:  Obtaining history from patient or surrogate, development of treatment plan with patient or surrogate, discussions with consultants, evaluation of patient's response to treatment, examination of patient, ordering and review of laboratory studies, ordering and review of radiographic studies, re-evaluation of patient's condition and review of old charts    I assumed direction of critical care for this patient from another provider in my specialty: no               ED Course  ED Course as of 05/25/22 1754   Sun May 22, 2022   2154 Since being placed on BiPAP AFib has changed to sinus tachycardia  Lungs sound improved  Heart rate is 116-120  Blood pressure 129/68  Will hold off on high-dose IV nitroglycerin at this point  Lasix ordered  Will reassess  Waiting for chest x-ray  No acute pain  EKG shows inferior and anteroseptal Q-waves  Nonspecific ST segments  SBIRT 22yo+    Flowsheet Row Most Recent Value   SBIRT (23 yo +)    In order to provide better care to our patients, we are screening all of our patients for alcohol and drug use  Would it be okay to ask you these screening questions? Yes Filed at: 05/22/2022 2136   Initial Alcohol Screen: US AUDIT-C     1  How often do you have a drink containing alcohol? 0 Filed at: 05/22/2022 2136   2  How many drinks containing alcohol do you have on a typical day you are drinking? 0 Filed at: 05/22/2022 2136   3b  FEMALE Any Age, or MALE 65+: How often do you have 4 or more drinks on one occassion? 0 Filed at: 05/22/2022 2136   Audit-C Score 0 Filed at: 05/22/2022 2136   ADNITZA: How many times in the past year have you    Used an illegal drug or used a prescription medication for non-medical reasons?  Never Filed at: 05/22/2022 2136                    MDM  Number of Diagnoses or Management Options  Acute pulmonary edema ([facility] [address] ): new and requires workup  Multiple sclerosis ([facility] [address] ): established and worsening  Thrombocytosis: established and worsening  Diagnosis management comments: Elderly male with multiple sclerosis presents with acute respiratory distress, clinically appears to be acute pulmonary edema  Will treat with BiPAP, loop diuretic  BP trending down, will hold off on Tridil at this time  Had afib wih rvr initially but too unstable for beta blocker  Will hold on other antidysrhythmics and follow clinically  Will check for electrolyte abnormality, NSTEMI, PÉ anemia, pneumonia, PTX, etc     Patient improved and stabilized with NIPPV and diuretic  Labs, imaging reviewed  Seen by critical care  To be admitted to [facility]       Amount and/or Complexity of Data Reviewed  Clinical lab tests: ordered and reviewed  Tests in the radiology section of CPT®: ordered and reviewed  Review and summarize past medical records: yes  Discuss the patient with other providers: yes  Independent visualization of images, tracings, or specimens: yes        Disposition  Final diagnoses:   Acute pulmonary edema ([facility] [address] )   Thrombocytosis   [facility]     Time reflects when diagnosis was documented in both MDM as applicable and the Disposition within this note     Time User Action Codes Description Comment    5/22/2022 11:34 PM Jerona Nica Add [J81 0] Acute pulmonary edema ([facility] [address] )     5/22/2022 11:34 PM Kostas [place] Add [D02 043] Thrombocytosis     5/22/2022 11:35 PM Jerona Nica Add [G35] Multiple sclerosis [facility])       ED Disposition     ED Disposition   Admit    Condition   Stable    Date/Time   Sun May 22, 2022 11:34 PM    Comment   Discussed with slim AP    Full admission to Dr Cass Veliz up With Specialties Details Why SETH [facility] [address], [address] Cardiology, Cardiology Imaging, Nurse Practitioner Follow up on 6/21/2022 You have a post hospital cardiology follow-up visit scheduled on 6/21/2022 at 11:40 am   Please arrive 15 minutes prior to your appointment time and bring an updated medication list [address] [facility]  [place]   [address] [address] [address] [phone]            Current Discharge Medication List      CONTINUE these medications which have NOT CHANGED    Details   acetaminophen (TYLENOL) 325 mg tablet Take 650 mg by mouth every 6 (six) hours as needed for mild pain      aspirin (ECOTRIN LOW STRENGTH) 81 mg EC tablet Take 81 mg by mouth in the morning  baclofen 10 mg tablet Take 10 mg by mouth every 6 (six) hours as needed for muscle spasms      Cholecalciferol (Vitamin D3) 1 25 MG (22038 UT) CAPS Take by mouth      cholestyramine sugar free (QUESTRAN LIGHT) 4 g packet Take 4 g by mouth 2 (two) times a day      clopidogrel (PLAVIX) 75 mg tablet Take 75 mg by mouth daily      gabapentin (NEURONTIN) 300 mg capsule Take 300 mg by mouth in the morning and 300 mg in the evening       gabapentin (NEURONTIN) 600 MG tablet Take 600 mg by mouth daily at bedtime      insulin glargine (LANTUS) 100 units/mL subcutaneous injection Inject 35 Units under the skin daily at bedtime      !! insulin lispro (HumaLOG) 100 units/mL injection Inject 12 Units under the skin in the morning and 12 Units in the evening        !! insulin lispro (insulin lispro) 100 units/mL injection Inject 10 Units under the skin daily at bedtime      loperamide (IMODIUM) 2 mg capsule Take 2 mg by mouth as needed in the morning and 2 mg as needed in the evening for diarrhea       loratadine (CLARITIN) 10 mg tablet Take 10 mg by mouth daily      morphine 20 MG/5ML solution Take 5 mg by mouth every 2 (two) hours as needed for severe pain (dyspnea)      pantoprazole (PROTONIX) 40 mg tablet Take 40 mg by mouth daily      polyethylene glycol-propylene glycol (Systane) 0 4-0 3 % every 3 (three) hours as needed      rOPINIRole (REQUIP) 0 5 mg tablet Take 0 5 mg by mouth daily at bedtime      tamsulosin (FLOMAX) 0 4 mg Take 0 4 mg by mouth daily with dinner      bisacodyl (Dulcolax) 10 mg suppository Insert 10 mg into the rectum daily      EMOLLIENT CREAM & WOUND GEL EX Apply topically      Glucagon, rDNA, (Glucagon Emergency) 1 MG KIT Inject as directed      magnesium hydroxide (MILK OF MAGNESIA) 400 mg/5 mL oral suspension Take by mouth daily as needed for constipation      polyethylene glycol (GOLYTELY) 4000 mL solution Take 4,000 mL by mouth once for 1 dose  Qty: 4000 mL, Refills: 0    Associated Diagnoses: Diarrhea, unspecified type      Probiotic Product (PROBIOTIC PO) Take by mouth      sodium phosphate-biphosphate (FLEET) 7-19 g 118 mL enema Insert 1 enema into the rectum      white petrolatum-corn starch-lanolin (TRIPLE PASTE) 12 8 % ointment Apply topically as needed for irritation       !! - Potential duplicate medications found  Please discuss with provider  No discharge procedures on file      PDMP Review     None          ED Provider  Electronically Signed by           Umberto Brooks DO  05/25/22 [id number]

## 2022-05-23 NOTE — CASE MANAGEMENT
Case Management Assessment & Discharge Planning Note    Patient name Bria Anne  Location  SDU/-01 S* MRN [id number]  : 1950 Date 2022       Current Admission Date: 2022  Current Admission Diagnosis:Acute pulmonary edema [facility])   Patient Active Problem List    Diagnosis Date Noted    [facility] ([facility] ) 2022    Acute respiratory failure ([facility] ) 2022    Coronary artery disease involving native coronary artery 2022    Elevated troponin 2022    Type 2 diabetes mellitus, with long-term current use of insulin ([facility] ) 2022    Thrombocytosis 2022    SIRS (systemic inflammatory response syndrome) ([facility] ) 2022    Diarrhea 2022    Multiple sclerosis ([facility] ) 2022    Antiplatelet or antithrombotic long-term use 2022    HTN (hypertension) 2017      LOS (days): 1  Geometric Mean LOS (GMLOS) (days):   Days to GMLOS:     OBJECTIVE:    Risk of Unplanned Readmission Score: 11 12         Current admission status: Inpatient       Preferred Pharmacy:   [address] [place] [address] [phone] [id number]  Phone: [phone] Fax: [phone]    Primary Care Provider: Aleksandr Robertson MD    Primary Insurance: MEDICARE  Secondary Insurance: [facility]    ASSESSMENT:  Active Health Care Proxies    There are no active Health Care Proxies on file         Advance Directives  Does patient have a Health Care POA?: Yes  Does patient have Advance Directives?: Yes  Advance Directives: Living will, Power of  for health care  Primary Contact: Sharon Loera - spouse         Readmission Root Cause  30 Day Readmission: No    Patient Information  Admitted from[de-identified] Facility  Mental Status: Alert  During Assessment patient was accompanied by: Not accompanied during assessment  Assessment information provided by[de-identified] Patient, Other - please comment, Spouse  Primary Caregiver: Other (Comment)  Caregiver's Name[de-identified] Mike Cabrales Relationship to Patient[de-identified] Other (Specify)  Caregiver's Telephone Number[de-identified] [phone]  Support Systems: Family members  [place] of Residence: [address] [place] [address] do you live in?: [facility] entry access options  Select all that apply : No steps to enter home  Type of Current Residence: Facility  Upon entering residence, is there a bedroom on the main floor (no further steps)?: Yes  Upon entering residence, is there a bathroom on the main floor (no further steps)?: Yes  In the last 12 months, was there a time when you were not able to pay the mortgage or rent on time?: No  In the last 12 months, how many places have you lived?: 1  In the last 12 months, was there a time when you did not have a steady place to sleep or slept in a shelter (including now)?: No  Homeless/housing insecurity resource given?: N/A  Living Arrangements: Other (Comment)  Is patient a ?: No    Activities of Daily Living Prior to Admission  Functional Status: Assistance  Completes ADLs independently?: No  Level of ADL dependence: Assistance  Ambulates independently?: No  Level of ambulatory dependence: Assistance  Does patient use assisted devices?: Yes  Assisted Devices (DME) used:  Wheelchair  Does patient currently own DME?: No  Does patient have a history of Outpatient Therapy (PT/OT)?: No  Does the patient have a history of Short-Term Rehab?: Yes ([facility])  Does patient have a history of HHC?: No  Does patient currently have [place] [address]?: No         Patient Information Continued  Income Source: SSI/SSD  Does patient have prescription coverage?: Yes  Within the past 12 months, you worried that your food would run out before you got the money to buy more : Never true  Within the past 12 months, the food you bought just didn't last and you didn't have money to get more : Never true  Food insecurity resource given?: N/A  Does patient receive dialysis treatments?: No  Does patient have a history of substance abuse?: No  Does patient have a history of Mental Health Diagnosis?: No         Means of Transportation  Means of Transport to Appts[de-identified] Family transport  In the past 12 months, has lack of transportation kept you from medical appointments or from getting medications?: No  In the past 12 months, has lack of transportation kept you from meetings, work, or from getting things needed for daily living?: No  Was application for public transport provided?: N/A        DISCHARGE DETAILS:    Discharge planning discussed with[de-identified] patient and spouse Caden Sang of Choice: Yes  Comments - Freedom of Choice: Patient is a long term resident of [facility] and will return there at discharge  CM contacted family/caregiver?: Yes  Were Treatment Team discharge recommendations reviewed with patient/caregiver?: Yes  Did patient/caregiver verbalize understanding of patient care needs?: Yes  Were patient/caregiver advised of the risks associated with not following Treatment Team discharge recommendations?: Yes    Contacts  Patient Contacts: Abdelrahman Kauffman - spouse  Contact Method: Phone  Phone Number: [phone]  Reason/Outcome: Continuity of Care, Emergency [address]         Is the patient interested in [place] [address] at discharge?: No    DME Referral Provided  Referral made for DME?: No       Treatment Team Recommendation: SNF  Discharge Destination Plan[de-identified] SNF  Transport at Discharge : BLS Ambulance       IMM Given (Date):: 05/23/22  IMM Given to[de-identified] Patient     Additional Comments: Patient is a long term resident of [facility] x 12 years  He needs assist with adl's, uses sit to stand for oob to w/c  Can self propel, feeds self  Plan is for patient to return to [facility] at discharge  Use BLS

## 2022-05-23 NOTE — PLAN OF CARE
Problem: Potential for Falls  Goal: Patient will remain free of falls  Description: INTERVENTIONS:  - Educate patient/family on patient safety including physical limitations  - Instruct patient to call for assistance with activity   - Consult OT/PT to assist with strengthening/mobility   - Keep Call bell within reach  - Keep bed low and locked with side rails adjusted as appropriate  - Keep care items and personal belongings within reach  - Initiate and maintain comfort rounds  - Make Fall Risk Sign visible to staff  - Offer Toileting every 2 Hours, in advance of need  - Initiate/Maintain bed alarm  -- Apply yellow socks and bracelet for high fall risk patients  - Consider moving patient to room near nurses station  Outcome: Progressing     Problem: MOBILITY - ADULT  Goal: Maintain or return to baseline ADL function  Description: INTERVENTIONS:  -  Assess patient's ability to carry out ADLs; assess patient's baseline for ADL function and identify physical deficits which impact ability to perform ADLs (bathing, care of mouth/teeth, toileting, grooming, dressing, etc )  - Assess/evaluate cause of self-care deficits   - Assess range of motion  - Assess patient's mobility; develop plan if impaired  - Assess patient's need for assistive devices and provide as appropriate  - Encourage maximum independence but intervene and supervise when necessary  - Involve family in performance of ADLs  - Assess for home care needs following discharge   - Consider OT consult to assist with ADL evaluation and planning for discharge  - Provide patient education as appropriate  Outcome: Progressing  Goal: Maintains/Returns to pre admission functional level  Description: INTERVENTIONS:  - Perform BMAT or MOVE assessment daily    - Set and communicate daily mobility goal to care team and patient/family/caregiver  - Collaborate with rehabilitation services on mobility goals if consulted  - Perform Range of Motion 2 times a day    - Reposition patient every 2 hours    -   Outcome: Progressing     Problem: Prexisting or High Potential for Compromised Skin Integrity  Goal: Skin integrity is maintained or improved  Description: INTERVENTIONS:  - Identify patients at risk for skin breakdown  - Assess and monitor skin integrity  - Assess and monitor nutrition and hydration status  - Monitor labs   - Assess for incontinence   - Turn and reposition patient  - Assist with mobility/ambulation  - Relieve pressure over bony prominences  - Avoid friction and shearing  - Provide appropriate hygiene as needed including keeping skin clean and dry  - Evaluate need for skin moisturizer/barrier cream  - Collaborate with interdisciplinary team   - Patient/family teaching  - Consider wound care consult   Outcome: Progressing     Problem: METABOLIC, FLUID AND ELECTROLYTES - ADULT  Goal: Fluid balance maintained  Description: INTERVENTIONS:  - Monitor labs   - Monitor I/O and WT  - Instruct patient on fluid and nutrition as appropriate  - Assess for signs & symptoms of volume excess or deficit  Outcome: Progressing

## 2022-05-23 NOTE — ASSESSMENT & PLAN NOTE
· Presented to the ED with significant work of breathing and rales on auscultation   · Pt placed on BiPAP and given lasix 40IV x1 with significant improvement in clinical appearance  · He was able to be weaned off of BiPAP and placed on 4L NC with SpO2 of 94% and decreased work of breathing  · Suspected etiology is acute pulmonary edema based on CXR findings and reported significant HTN at facility of 198/104   · Titrate oxygen as able for SpO2 >90%

## 2022-05-23 NOTE — ASSESSMENT & PLAN NOTE
· Platelets at 2570 on admission   · Prior thrombocytosis noted in 2017 at 700   · Pt on ASA and plavix already   · Hematology consult appreciated - hydrea started  · No acute need for pheresis per their report

## 2022-05-23 NOTE — PROGRESS NOTES
[place]  Progress Note - Loris Lundborg 1950, 70 y o  male MRN: [id number]  Unit/Bed#: -01 SDU Encounter: [id number]  Primary Care Provider: Carrie Conrad MD   Date and time admitted to hospital: 5/22/2022  9:27 PM    SIRS (systemic inflammatory response syndrome) ([facility] )  Assessment & Plan  · Sirs criteria met on admission:  Tachycardia, tachypnea, and leukocytosis   · Suspect that sirs is secondary to acute pulmonary edema resulting in acute respiratory failure   · No signs or symptoms of acute bacterial infection  · Monitor off antibiotics   · Trend CBC and fever    Thrombocytosis  Assessment & Plan  · Platelets at 7795 on admission   · Prior thrombocytosis noted in 2017 at 700   · Pt on ASA and plavix already   · Hematology consult appreciated - hydrea started  · No acute need for pheresis per their report    Type 2 diabetes mellitus, with long-term current use of insulin [facility])  Assessment & Plan  Lab Results   Component Value Date    HGBA1C 5 3 05/22/2022       Recent Labs     05/23/22  0108 05/23/22  0740 05/23/22  1102   POCGLU 199* 122 149*       Blood Sugar Average: Last 72 hrs:  (P) 800 [id number]   · Home regimen:  Lantus 35 units HS, Humalog 12 units b i d  meals, and Humalog 10 units HS  · Obtain updated hemoglobin A1c  · cont Lantus 20 units HS and Humalog 5 units t i d  with meals along with SSI    Elevated troponin  Assessment & Plan  · HS troponin: 448 < 663 <1090  · Suspect that this is secondary to non mi troponin elevation 2/2 acute heart failure  · Initially held on IV heparin, however once 4 hour returned at 1090 - heparin gtt initiated  · Cardiology already consulted, await echo     · Trend troponin until peak     Coronary artery disease involving native coronary artery  Assessment & Plan  · Noted on facility paperwork  · Echo in 2017 showing possible hypokinesis of anteroseptal and inferior walls  · Admission EKG with Q waves inferiorly     Acute respiratory failure ([address] )  Assessment & Plan  · Presented to the ED with significant work of breathing and rales on auscultation   · Pt placed on BiPAP and given lasix 40IV x1 with significant improvement in clinical appearance  · He was able to be weaned off of BiPAP and placed on 4L NC with SpO2 of 94% and decreased work of breathing  · Suspected etiology is acute pulmonary edema based on CXR findings and reported significant HTN at facility of 198/104   · Titrate oxygen as able for SpO2 >90%     HTN (hypertension)  Assessment & Plan  · No home anti-HTN on facility list  · Hypertensive at facility with symptom onset   · SBP ranging 120-150s inpatient   · Will hold on initiating anti-HTN to allow for IV diuresis     Multiple sclerosis (HCC)  Assessment & Plan  · Continue baclofen 10mg PRN and requip HS     Diarrhea  Assessment & Plan  · Currently being worked up by GI due to 4-6 episodes of loose stool daily   · SIBO testing negative   · Stool studies including Cdiff have not yet been completed outpatient - stool ordered for inpatient and pt maintained on contact precautions until resulted     * Acute pulmonary edema ([facility] )  Assessment & Plan  · Dyspnea x2 hours at facility with then increased work of breathing with BP of 198/104 at facility prompting ED evaluation   · Note - patient has been on continuous supplemental oxygen via nasal cannula over the last week due to dyspnea at facility and had been ordered morphine q 2 hours p r n  for dyspnea at facility  · Last echo 2017 showed "LVEF 55%  Possible mid to apical anteroseptal and inferior wall hypokinesis   Mild diastolic dysfunction "   · No home diuretics but documented hx of HF on facility papers    · CXR with pulmonary edema on my review   ·   · Placed on BiPAP initially and then weaned to 4L NC once there was decrease in work of breathing   · Given lasix 40mg IV - UOP at 450cc at 3 hours (suspect closer to 600 as pt had wet brief that was unable to be recorded)   · Lasix 40 IV BID - will cont   · Update echo  · Cardiology consult appreciated  · Strict I/O and daily weights   · Fluid and sodium restriction   · Admit to SD2 due to initially requiring BiPAP          VTE  Prophylaxis:   Pharmacologic: in place    Patient Centered Rounds: I have performed bedside rounds with nursing staff today  Discussions with Specialists or Other Care Team Provider: case management    Education and Discussions with Family / Patient: pt wife telephone    Current Length of Stay: 1 day(s)    Current Patient Status: Inpatient        Code Status: Level 3 - DNAR and DNI      Subjective:     Pt seen  Denies chest pain  Reports breathing better  Remains on 5L    Patient is seen and examined at bedside  All other ROS are negative  Objective:     Vitals:   Temp (24hrs), Av 1 °F (36 7 °C), Min:97 9 °F (36 6 °C), Max:98 2 °F (36 8 °C)    Temp:  [97 9 °F (36 6 °C)-98 2 °F (36 8 °C)] 98 2 °F (36 8 °C)  HR:  [] 84  Resp:  [19-24] 24  BP: (115-157)/(57-77) 127/59  SpO2:  [90 %-99 %] 93 %  Body mass index is 30 71 kg/m²  Input and Output Summary (last 24 hours):        Intake/Output Summary (Last 24 hours) at 2022 1212  Last data filed at 2022 1138  Gross per 24 hour   Intake 150 16 ml   Output 1625 ml   Net -1474 84 ml       Physical Exam:       GEN: No acute distress, comfortable  HEEENT: No JVD, PERRLA, no scleral icterus, o2  RESP: crackles  CV: RRR, +s1/s2   ABD: SOFT NON TENDER, POSITIVE BOWEL SOUNDS, NO DISTENTION  PSYCH: CALM  NEURO: A X O X 3, generalized weakness  SKIN: NO RASH  EXTREM: NO EDEMA    Additional Data:     Labs:    Results from last 7 days   Lab Units 22  0241 22  2231   WBC Thousand/uL 14 40* 14 28*   HEMOGLOBIN g/dL 12 9 12 6   HEMATOCRIT % 41 8 41 5   PLATELETS Thousands/uL 1,251* 1,156*   NEUTROS PCT %  --  82*   LYMPHS PCT %  --  6*   MONOS PCT %  --  7   EOS PCT %  --  3     Results from last 7 days   Lab Units 22  0241 SODIUM mmol/L 142   POTASSIUM mmol/L 3 7   CHLORIDE mmol/L 104   CO2 mmol/L 30   BUN mg/dL 21   CREATININE mg/dL 1 29   ANION GAP mmol/L 8   CALCIUM mg/dL 8 6   ALBUMIN g/dL 3 4*   TOTAL BILIRUBIN mg/dL 0 40   ALK PHOS U/L 114   ALT U/L 17   AST U/L 31   GLUCOSE RANDOM mg/dL 192*     Results from last 7 days   Lab Units 05/22/22  2144   INR  1 13     Results from last 7 days   Lab Units 05/23/22  1102 05/23/22  0740 05/23/22  0108   POC GLUCOSE mg/dl 149* 122 199*     Results from last 7 days   Lab Units 05/22/22  2231   HEMOGLOBIN A1C % 5 3               * I Have Reviewed All Lab Data Listed Above  Imaging:     Results for orders placed during the hospital encounter of 05/22/22    X-ray chest 1 view portable    Narrative  CHEST    INDICATION:   CHF  COMPARISON:  08/03/2009    EXAM PERFORMED/VIEWS:  XR CHEST PORTABLE  1 image    FINDINGS:    Cardiomediastinal silhouette appears unremarkable  The patient appears to be in congestive heart failure  The lungs are clear  No pneumothorax or pleural effusion  Osseous structures appear within normal limits for patient age  Impression  Congestive heart failure  Workstation performed: [id number]    No results found for this or any previous visit        *I have reviewed all imaging reports listed above      Recent Cultures (last 7 days):           Last 24 Hours Medication List:   Current Facility-Administered Medications   Medication Dose Route Frequency Provider Last Rate    aspirin  81 mg Oral Daily Debera Radhames, PA-C      baclofen  10 mg Oral Q6H PRN Debera Radhames, PA-C      cholestyramine sugar free  4 g Oral BID Debera Radhames, PA-C      clopidogrel  75 mg Oral Daily Debera Radhames, PA-C      furosemide  40 mg Intravenous BID (diuretic) Debera Radhames, PA-C      gabapentin  300 mg Oral BID Debera Radhames, PA-C      gabapentin  600 mg Oral HS Debera Radhames, PA-C      heparin (porcine)  3-20 Units/kg/hr (Order-Specific) Intravenous Titrated Lynita Cabot, PA-C 13 1 Units/kg/hr (05/23/22 1138)    heparin (porcine)  2,000 Units Intravenous Q1H PRN Lynita Cabot, PA-C      heparin (porcine)  4,000 Units Intravenous Q1H PRN Lynita Cabot, PA-C      hydroxyurea  500 mg Oral TID Andres Miranda DO      insulin glargine  20 Units Subcutaneous HS Lynita Cabot, PA-C      insulin lispro  1-5 Units Subcutaneous HS Lynita Cabot, PA-C      insulin lispro  1-6 Units Subcutaneous TID AC Lynita Cabot, PA-C      insulin lispro  5 Units Subcutaneous TID With Meals Lynita Cabot, PA-C      loratadine  10 mg Oral Daily Lynita Cabot, PA-C      pantoprazole  40 mg Oral Daily Before Breakfast Lynita Cabot, PA-C      rOPINIRole  0 5 mg Oral HS Lynita Cabot, PA-C      tamsulosin  0 4 mg Oral Daily With Jamshid Patel PA-C          Today, Patient Was Seen By: Carmen Ruby MD    ** Please Note: Dictation voice to text software may have been used in the creation of this document   **

## 2022-05-23 NOTE — ASSESSMENT & PLAN NOTE
· Platelets at 8635 on admission   · Prior thrombocytosis noted in 2017 at 700   · Pt on ASA and plavix already   · Hematology consult

## 2022-05-23 NOTE — ASSESSMENT & PLAN NOTE
· HS troponin: 448 < 663 <1090  · Suspect that this is secondary to non mi troponin elevation 2/2 acute heart failure  · Initially held on IV heparin, however once 4 hour returned at 1090 - heparin gtt initiated  · Cardiology already consulted, await echo     · Trend troponin until peak

## 2022-05-23 NOTE — ASSESSMENT & PLAN NOTE
Lab Results   Component Value Date    HGBA1C 5 3 05/22/2022       Recent Labs     05/23/22  0108 05/23/22  0740 05/23/22  1102   POCGLU 199* 122 149*       Blood Sugar Average: Last 72 hrs:  (P) 032 [id number]   · Home regimen:  Lantus 35 units HS, Humalog 12 units b i d  meals, and Humalog 10 units HS  · Obtain updated hemoglobin A1c  · cont Lantus 20 units HS and Humalog 5 units t i d  with meals along with SSI

## 2022-05-23 NOTE — CONSULTS
Oncology Consult Note  Loris Lundborg 70 y o  male MRN: [id number]  Unit/Bed#: -01 SDU Encounter: [id number]      Presenting Complaint: thrombocytosis      History of Presenting Illness: 71 yo male transferred from THE [facility] with SOB and b/l upper extremity pain  He was found to have pulmonary edema and did have an elevation in his troponins  The pain in his arms in resolved but he is still having some mild substernal CP  Cardiology has been consulted  He is at THE [facility] due to disability from [address]  He has left sided weakness from it and more recently has noticed blood in his stool  He has chronic diarrhea that is controlled with imodium  He has some numbness in his feet  No itching  No nosebleeds  I do have labs from [address] [place] [address] [address] in 2017 that show a plt count in the 700 range  plt count this morning is 1 2 million  He has never seen a hematologist in the past and was unaware that his platelet count has been high  WBC is slightly elevated at 14 and hgb = 12 9  He is on asa and plavix and a heparin gtt  Stool studies are ordered to workup his diarrhea  He has been afebrile this admission  He is essentially bed bound at [facility]  He required 2 assist for transfers  Review of Systems - As stated in the HPI otherwise the fourteen point review of systems was negative      ECOG PS:4    Past Medical History:   Diagnosis Date    Atrial fibrillation (HCC)     BPH (benign prostatic hyperplasia)     Congestive heart failure (CHF) (HCC)     Diabetes mellitus (HCC)     Hyperlipidemia     Hypertension     Multiple sclerosis (HCC)     Neuropathy     RLS (restless legs syndrome)        Social History     Socioeconomic History    Marital status: /Civil Union     Spouse name: None    Number of children: None    Years of education: None    Highest education level: None   Occupational History    None   Tobacco Use    Smoking status: Never Smoker    Smokeless tobacco: Never Used   Vaping Use    Vaping Use: Never used   Substance and Sexual Activity    Alcohol use: Not Currently    Drug use: Never    Sexual activity: None   Other Topics Concern    None   Social History Narrative    None     Social Determinants of Health     Financial Resource Strain: Not on file   Food Insecurity: Not on file   Transportation Needs: Not on file   Physical Activity: Not on file   Stress: Not on file   Social Connections: Not on file   Intimate Partner Violence: Not on file   Housing Stability: Not on file       History reviewed  No pertinent family history      Allergies   Allergen Reactions    Metformin GI Intolerance         Current Facility-Administered Medications:     aspirin (ECOTRIN LOW STRENGTH) EC tablet 81 mg, 81 mg, Oral, Daily, Roshni Cork, PA-C, 81 mg at 05/23/22 9463    baclofen tablet 10 mg, 10 mg, Oral, Q6H PRN, Roshni Cork, PA-C    cholestyramine sugar free (QUESTRAN LIGHT) packet 4 g, 4 g, Oral, BID, Roshni Cork, PA-C, 4 g at 05/23/22 1935    clopidogrel (PLAVIX) tablet 75 mg, 75 mg, Oral, Daily, Roshni Cork, PA-C, 75 mg at 05/23/22 8733    furosemide (LASIX) injection 40 mg, 40 mg, Intravenous, BID (diuretic), Roshni Cork, PA-C, 40 mg at 05/23/22 4622    gabapentin (NEURONTIN) capsule 300 mg, 300 mg, Oral, BID, Roshni Cork, PA-C, 300 mg at 05/23/22 5008    gabapentin (NEURONTIN) capsule 600 mg, 600 mg, Oral, HS, Roshni Cork, PA-C, 600 mg at 05/23/22 0107    heparin (porcine) 25,000 units in 0 45% NaCl 250 mL infusion (premix), 3-20 Units/kg/hr (Order-Specific), Intravenous, Titrated, Roshni Cork, PA-C, Last Rate: 10 mL/hr at 05/23/22 0237, 11 1 Units/kg/hr at 05/23/22 0237    heparin (porcine) injection 2,000 Units, 2,000 Units, Intravenous, Q1H PRN, Roshni Cork, PA-C    heparin (porcine) injection 4,000 Units, 4,000 Units, Intravenous, Q1H PRN, Roshni Cork, PA-C    hydroxyurea (HYDREA) capsule 500 mg, 500 mg, Oral, TID, Nadira Macias DO, 500 mg at 05/23/22 0853    insulin glargine (LANTUS) subcutaneous injection 20 Units 0 2 mL, 20 Units, Subcutaneous, HS, Vega Ryann, PA-C, 20 Units at 05/23/22 0107    insulin lispro (HumaLOG) 100 units/mL subcutaneous injection 1-5 Units, 1-5 Units, Subcutaneous, HS, Vega Ryann, PA-C, 1 Units at 05/23/22 0110    insulin lispro (HumaLOG) 100 units/mL subcutaneous injection 1-6 Units, 1-6 Units, Subcutaneous, TID AC **AND** Fingerstick Glucose (POCT), , , TID AC, Vega Garzon, PA-C    insulin lispro (HumaLOG) 100 units/mL subcutaneous injection 5 Units, 5 Units, Subcutaneous, TID With Meals, Vega Ryann PA-C, 5 Units at 05/23/22 0744    loratadine (CLARITIN) tablet 10 mg, 10 mg, Oral, Daily, Vega Garzon PA-C    pantoprazole (PROTONIX) EC tablet 40 mg, 40 mg, Oral, Daily Before Breakfast, MAE Broussard-C, 40 mg at 05/23/22 0854    rOPINIRole (REQUIP) tablet 0 5 mg, 0 5 mg, Oral, HS, Vega Ryann, PA-C, 0 5 mg at 05/23/22 0106    tamsulosin (FLOMAX) capsule 0 4 mg, 0 4 mg, Oral, Daily With Jie Anai, PA-C      /60 (BP Location: Right arm)   Pulse 84   Temp 98 2 °F (36 8 °C) (Oral)   Resp (!) 24   Ht 5' 10" (1 778 m)   Wt 97 2 kg (214 lb 4 6 oz)   SpO2 93%   BMI 30 75 kg/m²     General Appearance:    Alert, oriented        Eyes:    PERRL   Ears:    Normal external ear canals, both ears   Nose:   Nares normal, septum midline   Throat:   Mucosa moist  Pharynx without injection  Neck:   Supple       Lungs:     Decreased BS b/l   Chest Wall:    No tenderness or deformity    Heart:    Regular rate and rhythm       Abdomen:     Soft, non-tender, bowel sounds +, no organomegaly           Extremities:   Extremities no cyanosis or edema       Skin:   no rash or icterus      Lymph nodes:   Cervical, supraclavicular, and axillary nodes normal   Neurologic:   CNII-XII intact, normal strength, sensation and reflexes Throughout               Recent Results (from the past 48 hour(s))   ECG 12 lead    Collection Time: 05/22/22  9:31 PM   Result Value Ref Range    Ventricular Rate 121 BPM    Atrial Rate 121 BPM    IA Interval 142 ms    QRSD Interval 94 ms    QT Interval 346 ms    QTC Interval 491 ms    P Axis 49 degrees    QRS Axis 21 degrees    T Wave Axis 82 degrees   Comprehensive metabolic panel    Collection Time: 05/22/22  9:44 PM   Result Value Ref Range    Sodium 138 136 - 145 mmol/L    Potassium 3 6 3 5 - 5 3 mmol/L    Chloride 107 100 - 108 mmol/L    CO2 24 21 - 32 mmol/L    ANION GAP 7 4 - 13 mmol/L    BUN 21 5 - 25 mg/dL    Creatinine 1 18 0 60 - 1 30 mg/dL    Glucose 239 (H) 65 - 140 mg/dL    Calcium 8 4 8 3 - 10 1 mg/dL    Corrected Calcium 9 0 8 3 - 10 1 mg/dL    AST 28 5 - 45 U/L    ALT 12 12 - 78 U/L    Alkaline Phosphatase 121 (H) 46 - 116 U/L    Total Protein 7 4 6 4 - 8 2 g/dL    Albumin 3 3 (L) 3 5 - 5 0 g/dL    Total Bilirubin 0 40 0 20 - 1 00 mg/dL    eGFR 61 ml/min/1 73sq m   NT-BNP PRO    Collection Time: 05/22/22  9:44 PM   Result Value Ref Range    NT-proBNP 389 (H) <125 pg/mL   APTT    Collection Time: 05/22/22  9:44 PM   Result Value Ref Range    PTT 37 23 - 37 seconds   Protime-INR    Collection Time: 05/22/22  9:44 PM   Result Value Ref Range    Protime 14 4 11 6 - 14 5 seconds    INR 1 13 0 84 - 1 19   HS Troponin 0hr (reflex protocol)    Collection Time: 05/22/22  9:44 PM   Result Value Ref Range    hs TnI 0hr 448 (H) "Refer to ACS Flowchart"- see link ng/L   POCT Blood Gas (CG8+)    Collection Time: 05/22/22  9:50 PM   Result Value Ref Range    ph, Joe ISTAT 7 381 7 300 - 7 400    pCO2, Joe i-STAT 38 1 (L) 42 0 - 50 0 mm HG    pO2, Joe i-STAT 71 0 (H) 35 0 - 45 0 mm HG    BE, i-STAT -2 -2 - 3 mmol/L    HCO3, Joe i-STAT 22 6 (L) 24 0 - 30 0 mmol/L    CO2, i-STAT 24 21 - 32 mmol/L    O2 Sat, i-STAT 94 (H) 60 - 85 %    SODIUM, I-STAT 140 136 - 145 mmol/l    Potassium, i-STAT 3 8 3 5 - 5 3 mmol/L Calcium, Ionized i-STAT 1 11 (L) 1 12 - 1 32 mmol/L    Hct, i-STAT 41 36 5 - 49 3 %    Hgb, i-STAT 13 9 12 0 - 17 0 g/dl    Glucose, i-STAT 249 (H) 65 - 140 mg/dl    Specimen Type VENOUS    COVID/FLU/RSV    Collection Time: 05/22/22 10:02 PM    Specimen: Nasopharyngeal Swab; Nares   Result Value Ref Range    SARS-CoV-2 Negative Negative    INFLUENZA A PCR Negative Negative    INFLUENZA B PCR Negative Negative    RSV PCR Negative Negative   CBC and differential    Collection Time: 05/22/22 10:31 PM   Result Value Ref Range    WBC 14 28 (H) 4 31 - 10 16 Thousand/uL    RBC 4 76 3 88 - 5 62 Million/uL    Hemoglobin 12 6 12 0 - 17 0 g/dL    Hematocrit 41 5 36 5 - 49 3 %    MCV 87 82 - 98 fL    MCH 26 5 (L) 26 8 - 34 3 pg    MCHC 30 4 (L) 31 4 - 37 4 g/dL    RDW 19 7 (H) 11 6 - 15 1 %    MPV 10 8 8 9 - 12 7 fL    Platelets 5,340 (HH) 149 - 390 Thousands/uL    nRBC 0 /100 WBCs    Neutrophils Relative 82 (H) 43 - 75 %    Immat GRANS % 1 0 - 2 %    Lymphocytes Relative 6 (L) 14 - 44 %    Monocytes Relative 7 4 - 12 %    Eosinophils Relative 3 0 - 6 %    Basophils Relative 1 0 - 1 %    Neutrophils Absolute 11 85 (H) 1 85 - 7 62 Thousands/µL    Immature Grans Absolute 0 08 0 00 - 0 20 Thousand/uL    Lymphocytes Absolute 0 86 0 60 - 4 47 Thousands/µL    Monocytes Absolute 1 00 0 17 - 1 22 Thousand/µL    Eosinophils Absolute 0 37 0 00 - 0 61 Thousand/µL    Basophils Absolute 0 12 (H) 0 00 - 0 10 Thousands/µL   Hemoglobin A1c w/EAG Estimation (Orders if not completed within the last 90 days)    Collection Time: 05/22/22 10:31 PM   Result Value Ref Range    Hemoglobin A1C 5 3 Normal 3 8-5 6%; PreDiabetic 5 7-6 4%;  Diabetic >=6 5%; Glycemic control for adults with diabetes <7 0% %     mg/dl   HS Troponin I 2hr    Collection Time: 05/22/22 11:52 PM   Result Value Ref Range    hs TnI 2hr 663 (H) "Refer to ACS Flowchart"- see link ng/L    Delta 2hr hsTnI 215 (H) <20 ng/L   Fingerstick Glucose (POCT)    Collection Time: 05/23/22 1:08 AM   Result Value Ref Range    POC Glucose 199 (H) 65 - 140 mg/dl   ECG 12 lead    Collection Time: 05/23/22  1:19 AM   Result Value Ref Range    Ventricular Rate 91 BPM    Atrial Rate 91 BPM    [place] Interval 150 ms    QRSD Interval 92 ms    QT Interval 380 ms    QTC Interval 467 ms    P Axis 31 degrees    QRS Axis -15 degrees    T Wave [place] 107 degrees   HS Troponin I 4hr    Collection Time: 05/23/22  1:30 AM   Result Value Ref Range    hs TnI 4hr 1,090 (H) "Refer to ACS Flowchart"- see link ng/L    Delta 4hr hsTnI 642 (H) <20 ng/L   Urinalysis with microscopic    Collection Time: 05/23/22  1:46 AM   Result Value Ref Range    Clarity, UA Clear     Color, UA Yellow     Specific Gravity, UA 1 025 1 003 - 1 030    pH, UA 5 5 4 5, 5 0, 5 5, 6 0, 6 5, 7 0, 7 5, 8 0    Glucose, UA Negative Negative mg/dl    Ketones, UA Negative Negative mg/dl    Blood, UA Negative Negative    Protein, UA Trace (A) Negative mg/dl    Nitrite, UA Negative Negative    Bilirubin, UA Negative Negative    Urobilinogen, UA 0 2 0 2, 1 0 E U /dl E U /dl    Leukocytes, UA Negative Negative    WBC, UA None Seen None Seen, 2-4, 5-60 /hpf    RBC, UA None Seen None Seen, 2-4 /hpf    Bacteria, UA None Seen None Seen, Occasional /hpf    Epithelial Cells None Seen None Seen, Occasional /hpf   High Sensitivity Troponin I Random    Collection Time: 05/23/22  2:26 AM   Result Value Ref Range    HS TnI random 1,439 (H) 8 - 18 ng/L   APTT six (6) hours after Heparin bolus/drip initiation or dosing change    Collection Time: 05/23/22  2:26 AM   Result Value Ref Range    PTT 39 (H) 23 - 37 seconds   CBC (With Platelets)    Collection Time: 05/23/22  2:41 AM   Result Value Ref Range    WBC 14 40 (H) 4 31 - 10 16 Thousand/uL    RBC 4 78 3 88 - 5 62 Million/uL    Hemoglobin 12 9 12 0 - 17 0 g/dL    Hematocrit 41 8 36 5 - 49 3 %    MCV 87 82 - 98 fL    MCH 27 0 26 8 - 34 3 pg    MCHC 30 9 (L) 31 4 - 37 4 g/dL    RDW 19 8 (H) 11 6 - 15 1 %    Platelets 9,673 () 149 - 390 Thousands/uL    MPV 10 6 8 9 - 12 7 fL   Comprehensive metabolic panel    Collection Time: 05/23/22  2:41 AM   Result Value Ref Range    Sodium 142 136 - 145 mmol/L    Potassium 3 7 3 5 - 5 3 mmol/L    Chloride 104 100 - 108 mmol/L    CO2 30 21 - 32 mmol/L    ANION GAP 8 4 - 13 mmol/L    BUN 21 5 - 25 mg/dL    Creatinine 1 29 0 60 - 1 30 mg/dL    Glucose 192 (H) 65 - 140 mg/dL    Calcium 8 6 8 3 - 10 1 mg/dL    Corrected Calcium 9 1 8 3 - 10 1 mg/dL    AST 31 5 - 45 U/L    ALT 17 12 - 78 U/L    Alkaline Phosphatase 114 46 - 116 U/L    Total Protein 7 7 6 4 - 8 2 g/dL    Albumin 3 4 (L) 3 5 - 5 0 g/dL    Total Bilirubin 0 40 0 20 - 1 00 mg/dL    eGFR 55 ml/min/1 73sq m   Magnesium    Collection Time: 05/23/22  2:41 AM   Result Value Ref Range    Magnesium 2 3 1 6 - 2 6 mg/dL   Fingerstick Glucose (POCT)    Collection Time: 05/23/22  7:40 AM   Result Value Ref Range    POC Glucose 122 65 - 140 mg/dl         X-ray chest 1 view portable    Result Date: 5/23/2022  Narrative: CHEST INDICATION:   CHF  COMPARISON:  08/03/2009 EXAM PERFORMED/VIEWS:  XR CHEST PORTABLE 1 image FINDINGS: Cardiomediastinal silhouette appears unremarkable  The patient appears to be in congestive heart failure  The lungs are clear  No pneumothorax or pleural effusion  Osseous structures appear within normal limits for patient age  Impression: Congestive heart failure  Workstation performed: [id number]       Assessment and Plan: 71 yo male with thrombocytosis  I cannot say there is a definite reactive process at work  I will sent a jak2 panel on peripheral blood  I would also like to see cardiology's opinion of this troponin spill  If it is c/w the pulmonary edema and it doesn't appear to be a cardiac event precipitated by the thrombocytosis, I do not think platelet pheresis is indicated  We do need to monitor for bleeding on the ASA/plavix and heparin    When plt count is > 1 million it is possible to develop an acquired VWD picture  I have also started him on hydrea 500 mg TID  Will follow with you  I spent 45 minutes on chart review, direct face to face counseling, coordination of care and documentation

## 2022-05-23 NOTE — ASSESSMENT & PLAN NOTE
· No home anti-HTN on facility list  · Hypertensive at facility with symptom onset   · SBP ranging 120-150s inpatient   · Will hold on initiating anti-HTN to allow for IV diuresis

## 2022-05-23 NOTE — H&P
[place]  H&P- Skyler Catracho 1950, 70 y o  male MRN: [id number]  Unit/Bed#: -01 SDU Encounter: [id number]  Primary Care Provider: Lizbeth Espinosa MD   Date and time admitted to hospital: 5/22/2022  9:27 PM    * Acute pulmonary edema [facility])  Assessment & Plan  · Dyspnea x2 hours at facility with then increased work of breathing with BP of 198/104 at facility prompting ED evaluation   · Note - patient has been on continuous supplemental oxygen via nasal cannula over the last week due to dyspnea at facility and had been ordered morphine q 2 hours p r n  for dyspnea at facility  · Last echo 2017 showed "LVEF 55%  Possible mid to apical anteroseptal and inferior wall hypokinesis   Mild diastolic dysfunction "   · No home diuretics but documented hx of HF on facility papers    · CXR with pulmonary edema on my review   ·   · Placed on BiPAP initially and then weaned to 4L NC once there was decrease in work of breathing   · Given lasix 40mg IV - UOP at 450cc at 3 hours (suspect closer to 600 as pt had wet brief that was unable to be recorded)   · Lasix 40 IV BID ordered   · Update echo  · Cardiology consult  · Strict I/O and daily weights   · Fluid and sodium restriction   · Admit to SD2 due to initially requiring BiPAP     Acute respiratory failure ([address] )  Assessment & Plan  · Presented to the ED with significant work of breathing and rales on auscultation   · Pt placed on BiPAP and given lasix 40IV x1 with significant improvement in clinical appearance  · He was able to be weaned off of BiPAP and placed on 4L NC with SpO2 of 94% and decreased work of breathing  · Suspected etiology is acute pulmonary edema based on CXR findings and reported significant HTN at facility of 198/104   · Titrate oxygen as able for SpO2 >90%     Elevated troponin  Assessment & Plan  · HS troponin: 448 < 663 <1090  · Suspect that this is secondary to ischemic demand for acute pulmonary edema as above · Initially held on IV heparin, however once 4 hour returned at 1090 - heparin gtt initiated  · Cardiology already consulted  · Trend troponin until peak     Thrombocytosis  Assessment & Plan  · Platelets at 4589 on admission   · Prior thrombocytosis noted in 2017 at 700   · Pt on ASA and plavix already   · Hematology consult     SIRS (systemic inflammatory response syndrome) ([facility])  Assessment & Plan  · Sirs criteria met on admission:  Tachycardia, tachypnea, and leukocytosis   · Suspect that sirs is secondary to acute pulmonary edema resulting in acute respiratory failure   · No signs or symptoms of acute bacterial infection  · Monitor off antibiotics   · Trend CBC and fever    Coronary artery disease involving native coronary artery  Assessment & Plan  · Noted on facility paperwork  · Echo in 2017 showing possible hypokinesis of anteroseptal and inferior walls  · Admission EKG with Q waves inferiorly     Type 2 diabetes mellitus, with long-term current use of insulin ([facility])  Assessment & Plan  No results found for: HGBA1C    Recent Labs     05/23/22  0108   POCGLU 199*       Blood Sugar Average: Last 72 hrs:  (P) 199   · Home regimen:  Lantus 35 units HS, Humalog 12 units b i d  meals, and Humalog 10 units HS  · Obtain updated hemoglobin A1c  · Will start with Lantus 20 units HS and Humalog 5 units t i d  with meals along with SSI    Diarrhea  Assessment & Plan  · Currently being worked up by GI due to 4-6 episodes of loose stool daily   · SIBO testing negative   · Stool studies including Cdiff have not yet been completed outpatient - stool ordered for inpatient and pt maintained on contact precautions until resulted     HTN (hypertension)  Assessment & Plan  · No home anti-HTN on facility list  · Hypertensive at facility with symptom onset   · SBP ranging 120-150s inpatient   · Will hold on initiating anti-HTN to allow for IV diuresis     Multiple sclerosis (HCC)  Assessment & Plan  · Continue baclofen 10mg PRN and requip HS     VTE Pharmacologic Prophylaxis: VTE Score: 6 High Risk (Score >/= 5) - Pharmacological DVT Prophylaxis Ordered: heparin drip  Sequential Compression Devices Ordered  Code Status: Level 3 - DNAR and DNI   Discussion with family: Patient declined a call to his wife stating that she has to work tomorrow and would like her to be called later in the day  Anticipated Length of Stay: Patient will be admitted on an inpatient basis with an anticipated length of stay of greater than 2 midnights secondary to Acute pulmonary edema, acute respiratory failure, elevated troponin, thrombocytosis, IDDM 2, MS, sirs  Total Time for Visit, including Counseling / Coordination of Care: 60 minutes Greater than 50% of this total time spent on direct patient counseling and coordination of care  Chief Complaint: "I was sitting on the toilet and I started to have tingling in my biceps and felt really weak"    History of Present Illness:  Vu Muse is a 70 y o  male with a PMH of MS, unspecified HF, Afib, IDDM2, and CAD who presents from [facility] for evaluation of arm pain and dyspnea  Pt reports that he was helped on to the toilet at nursing home, but was taken off the 4L NC oxygen that he has been on for the last week due to dyspnea  Upon getting to the toilet, he felt short of breath and experienced pain in his bilateral biceps that he describes as "tingling " He states that he intermittently gets this pain in his arms and it goes away  He is unable to rate the pain on a scale of 1-10  No associated chest pain, nausea, or diaphoresis  Admits to generalized weakness with onset of dyspnea and pain  Upon being helped off the toilet and into his chair with oxygen re-applied, the pain in his biceps resolved and his dyspnea and weakness improved  Spoke with staff at facility who stated that upon getting the patient back into his chair his vitals were BP of 198/104, HR at 126bpm, and SpO2 was 89% on 4L   He was given morphine and breathing treatment and EMS was called  On admission, pt reports his breathing feels significantly better than when he first came to the hospital and the bicep tingling has completed resolved  Pt reports baseline weakness in b/l UE (R>L) especially in the proximal muscles  Note, on review of patient records from facility he is listed as "Do not hospitalize and comfort measures only" with morphine ordered q2h PRN for dyspnea and pain  When patient was questioned about this, he said he only wishes for no heroic measures like CPR and is willing to be hospitalized for medical issues  Review of Systems:  Review of Systems    Past Medical and Surgical History:   Past Medical History:   Diagnosis Date    [facility]     BPH (benign prostatic hyperplasia)     Congest[facility] failure ([facility]F) ([facility])     Diabetes mellitus ([facility] )     Hyperlipidemia     Hypertension     Multiple sclerosis ([facility])     Neuropathy     RLS (restless legs syndrome)        History reviewed  No pertinent surgical history  Meds/Allergies:  Prior to Admission medications    Medication Sig Start Date End Date Taking? Authorizing Provider   acetaminophen (TYLENOL) 325 mg tablet Take 650 mg by mouth every 6 (six) hours as needed for mild pain   Yes Historical Provider, MD   aspirin (ECOTRIN LOW STRENGTH) 81 mg EC tablet Take 81 mg by mouth in the morning     Yes Historical Provider, MD   baclofen 10 mg tablet Take 10 mg by mouth every 6 (six) hours as needed for muscle spasms   Yes Historical Provider, MD   Cholecalciferol (Vitamin D3) 1 25 MG (47565 UT) CAPS Take by mouth   Yes Historical Provider, MD   cholestyramine sugar free (QUESTRAN LIGHT) 4 g packet Take 4 g by mouth 2 (two) times a day   Yes Historical Provider, MD   clopidogrel (PLAVIX) 75 mg tablet Take 75 mg by mouth daily   Yes Historical Provider, MD   gabapentin (NEURONTIN) 300 mg capsule Take 300 mg by mouth in the morning and 300 mg in the evening  2/20/22  Yes Historical Provider, MD   gabapentin (NEURONTIN) 600 MG tablet Take 600 mg by mouth daily at bedtime 2/1/22  Yes Historical Provider, MD   insulin glargine (LANTUS) 100 units/mL subcutaneous injection Inject 35 Units under the skin daily at bedtime   Yes Historical Provider, MD   insulin lispro (HumaLOG) 100 units/mL injection Inject 12 Units under the skin in the morning and 12 Units in the evening  Yes Historical Provider, MD   insulin lispro (insulin lispro) 100 units/mL injection Inject 10 Units under the skin daily at bedtime   Yes Historical Provider, MD   loperamide (IMODIUM) 2 mg capsule Take 2 mg by mouth as needed in the morning and 2 mg as needed in the evening for diarrhea   1/23/22  Yes Historical Provider, MD   loratadine (CLARITIN) 10 mg tablet Take 10 mg by mouth daily   Yes Historical Provider, MD   morphine 20 MG/5ML solution Take 5 mg by mouth every 2 (two) hours as needed for severe pain (dyspnea)   Yes Historical Provider, MD   pantoprazole (PROTONIX) 40 mg tablet Take 40 mg by mouth daily   Yes Historical Provider, MD   polyethylene glycol-propylene glycol (Systane) 0 4-0 3 % every 3 (three) hours as needed   Yes Historical Provider, MD   rOPINIRole (REQUIP) 0 5 mg tablet Take 0 5 mg by mouth daily at bedtime   Yes Historical Provider, MD   tamsulosin (FLOMAX) 0 4 mg Take 0 4 mg by mouth daily with dinner   Yes Historical Provider, MD   bisacodyl (Dulcolax) 10 mg suppository Insert 10 mg into the rectum daily    Historical Provider, MD   EMOLLIENT CREAM & WOUND GEL EX Apply topically    Historical Provider, MD   Glucagon, rDNA, (Glucagon Emergency) 1 MG KIT Inject as directed    Historical Provider, MD   magnesium hydroxide (MILK OF MAGNESIA) 400 mg/5 mL oral suspension Take by mouth daily as needed for constipation    Historical Provider, MD   polyethylene glycol (GOLYTELY) 4000 mL solution Take 4,000 mL by mouth once for 1 dose 3/15/22 3/15/22  CATARINO Storm Probiotic Product (PROBIOTIC PO) Take by mouth    Historical Provider, MD   sodium phosphate-biphosphate (FLEET) 7-19 g 118 mL enema Insert 1 enema into the rectum    Historical Provider, MD   white petrolatum-corn starch-lanolin (TRIPLE PASTE) 12 8 % ointment Apply topically as needed for irritation    Historical Provider, MD   cholestyramine (QUESTRAN) 4 g packet Take 1 packet (4 g total) by mouth in the morning 2/25/22 5/23/22  CATARINO Camara   sodium chloride 0 9 % SOLN 100 mL with insulin regular 100 units/mL SOLN 100 Units Infuse 100 Units into a venous catheter once  5/23/22  Historical Provider, MD     I have reveiwed home medications using records provided by [facility]  Allergies: Allergies   Allergen Reactions    Metformin GI Intolerance       Social History:  Marital Status: /Civil Union   Occupation: retired  Patient Pre-hospital Living Situation: [facility]  Patient Pre-hospital Level of Mobility: Wheelchair bound  Uses sit to stand to transfer  Patient Pre-hospital Diet Restrictions: normal diet per facility records   Substance Use History:   Social History     Substance and Sexual Activity   Alcohol Use Not Currently     Social History     Tobacco Use   Smoking Status Never Smoker   Smokeless Tobacco Never Used     Social History     Substance and Sexual Activity   Drug Use Never       Family History:  History reviewed  No pertinent family history  Physical Exam:     Vitals:   Blood Pressure: 127/58 (05/23/22 0032)  Pulse: 91 (05/23/22 0032)  Temperature: 98 °F (36 7 °C) (05/23/22 0032)  Temp Source: Oral (05/23/22 0032)  Respirations: (!) 24 (05/23/22 0032)  Height: 5' 10" (177 8 cm) (05/22/22 2132)  Weight - Scale: 97 9 kg (215 lb 13 3 oz) (05/23/22 0041)  SpO2: 94 % (05/23/22 0032)    Physical Exam  Vitals and nursing note reviewed  Constitutional:       General: He is not in acute distress  Appearance: Normal appearance  He is not ill-appearing     HENT: Head: Normocephalic  Nose: Nose normal       Mouth/Throat:      Mouth: Mucous membranes are moist       Comments: Multiple missing teeth  Eyes:      Extraocular Movements: Extraocular movements intact  Conjunctiva/sclera: Conjunctivae normal    Cardiovascular:      Rate and Rhythm: Normal rate and regular rhythm  Pulses: Normal pulses  Heart sounds: No murmur heard  Pulmonary:      Breath sounds: Rales (throughotu posterior lung fields) present  No wheezing or rhonchi  Comments: On 4L supplemental oxygen via NC with SpO2 at 92%  Mild conversational dyspnea  Musculoskeletal:      Cervical back: Normal range of motion  Comments: B/L trace LE edema   Skin:     General: Skin is warm and dry  Neurological:      Mental Status: He is alert  Comments: Alert and oriented to self, place, month, year, and president  Motor: Decreased strength in RUE with shoulder abduction, however  strength is equal compared to LUE  B/L LEs with 3/5 strength that pt states is at baseline  Sensation: Equal and intact    Right facial droop noted while at rest, however when asked to smile and with conversation there is no asymmetry      Psychiatric:         Mood and Affect: Mood normal           Additional Data:     Lab Results:  Results from last 7 days   Lab Units 05/22/22  2231   WBC Thousand/uL 14 28*   HEMOGLOBIN g/dL 12 6   HEMATOCRIT % 41 5   PLATELETS Thousands/uL 1,156*   NEUTROS PCT % 82*   LYMPHS PCT % 6*   MONOS PCT % 7   EOS PCT % 3     Results from last 7 days   Lab Units 05/22/22  2150 05/22/22  2144   SODIUM mmol/L  --  138   POTASSIUM mmol/L  --  3 6   CHLORIDE mmol/L  --  107   CO2 mmol/L  --  24   CO2, I-STAT mmol/L 24  --    BUN mg/dL  --  21   CREATININE mg/dL  --  1 18   ANION GAP mmol/L  --  7   CALCIUM mg/dL  --  8 4   ALBUMIN g/dL  --  3 3*   TOTAL BILIRUBIN mg/dL  --  0 40   ALK PHOS U/L  --  121*   ALT U/L  --  12   AST U/L  --  28   GLUCOSE RANDOM mg/dL  --  239* Results from last 7 days   Lab Units 05/22/22  2144   INR  1 13     Results from last 7 days   Lab Units 05/23/22  0108   POC GLUCOSE mg/dl 199*               Imaging: Personally reviewed the following imaging: chest xray  X-ray chest 1 view portable    (Results Pending)       EKG and Other Studies Reviewed on Admission:   · EKG: Sinus tachycardia at 121 bpm  Q waves noted inferiorly  No acute ST elevation or depressions  No T wave inversions       ** Please Note: This note has been constructed using a voice recognition system   **

## 2022-05-23 NOTE — CONSULTS
Consultation - Cardiology Team One  Chel Leal 70 y o  male MRN: [id number]  Unit/Bed#: -01 SDU Encounter: [id number]    Inpatient consult to Cardiology  Consult performed by: CATARINO Escobedo  Consult ordered by: Dorothea Eckert PA-C          Physician Requesting Consult: Monica Cordero MD  Reason for Consult / Principal Problem: acute pulmonary edema    Assessment:    Acute hypoxic respiratory failure  · To the ED from [facility] with c/o B/L arm pain and dyspnea that worsened when being moved to bathroom by staff  · Of note; patient has been on 4 L NC at his nursing facility and has been receiving morphine for "dyspnea" for at least the past 2 weeks; unclear if the etiology behind this has been worked up  · requiring BIPAP on admit due to WOB; now down to 5 L NC   · Chest xray with evidence of pulm edema by my review; will await official read    Acute pulmonary edema  · Presentation and chest xray as above; BP at nursing facility reported as 198/104 PTA  · proBNP 389  · IV lasix 40 mg x 1 dose given last night and now ordered as BID dosing; agree to continue this AM as he is still requiring O2 above his recent baseline and he continues with bibasilar rales and he is having a good diuretic response  · UO since admit is 1 1 L  · Flash pulmonary edema likely secondary to accelerated HTN but may be related to CHF given hx of dyspnea x 2 weeks with no work-up as outpatient; will await TTE, though his BNP is not significantly elevated and he has no peripheral edema    Elevated troponin with B/L arm pain  · Presentation as above; has had BARCENAS x 2 weeks but noted B/L UE "tingling" in his triceps with exertion; no c/o CP  · EKG on admit sinus tachycardia with non-specific ST/T wave changes  · hsTn 448->663->1,090->1,439  · IV heparin drip started by SLIM per ACS protocol  · It is possible that his troponin are elevated due to accelerated HTN and flash pulm edema on admit BUT given his risk factors for CAD (age/fam hx of CAD/HTN/DM2 ) in conjunction with recent BARCENAS and B/L tricep "tingling" with exertion PTA, there could be concern for NSTEMI; to err on the side of caution, we will continue IV heparin drip for 48 hrs and treat patient medically for presumed CAD; patient does not wish to pursue cardiac cath and would rather medical management   · Continue aspirin 81 mg QD, will start Toprol XL 25 mg QD and will order Lipid panel and begin Lipitor 40 mg QD, continue outpatient dose of Plavix 75 mg QD    Essential HTN  · SBP averaging 120's; HTN listed in PMH  · Not on any anti-hypertensives as outpatient    CAD  · Noted on paperwork from facility; no prior documentation  · GDMT: aspirin 81 mg QD, Plavix 75 mg QD, not on BB or statin    DM2  · HgbA1c 5 3  · Management per primary team    thrombocytosis  · PLT 1156 on admit; prior hx of thrombocytosis noted 2017  · On ASA/Plavix as outpatient  · Hematology consulted    Multiple sclerosis    Plan/Recommendations:  · IV lasix 40 mg x 1 dose given last night and now ordered as BID dosing; agree to continue this AM as he is still requiring O2 above his recent baseline and he continues with bibasilar rales and he is having a good diuretic response  · Flash pulmonary edema likely secondary to accelerated HTN but may be related to CHF given hx of dyspnea x 2 weeks with no work-up as outpatient; will await TTE, though his BNP is not significantly elevated and he has no peripheral edema  · It is possible that his troponin are elevated due to accelerated HTN and flash pulm edema on admit BUT given his risk factors for CAD (age/fam hx of CAD/HTN/DM2) in conjunction with recent BARCENAS and B/L tricep "tingling" with exertion PTA, there could be concern for NSTEMI; to err on the side of caution, we will continue IV heparin drip for 48 hrs and treat patient medically for presumed CAD; patient does not wish to pursue cardiac cath and would rather medical management · Continue aspirin 81 mg QD, will start Toprol XL 25 mg QD and will order Lipid panel and begin Lipitor 40 mg QD, continue outpatient dose of Plavix 75 mg QD    _________________________________________________________________    History of Present Illness   HPI: Vero Argueta is a 70y o  year old male with PMH of MS, HTN, thrombocytosis, DM2, and CAD  Patient is brought to the ED yesterday evening from his nursing facility secondary to worsening dyspnea on exertion above his baseline as well as bilateral arm tingling  Patient resides at [facility] due to hx of MS, of note is documented the patient has been placed on 4 L of oxygen nasal cannula with orders for morphine as needed for dyspnea over at least the past 2 weeks  It is unclear if any outpatient workup has been performed to determine the underlying etiology  The patient states that initially his dyspnea was only on exertion but more recently has even been at rest at times  The patient states that he was receiving assistance ambulating to the bathroom yesterday evening when he noticed his shortness of breath to become acutely worse and he also noted a "tingling sensation" in his bilateral triceps  The patient states he has never had this type of discomfort before and states that it did not radiate into his chest or anywhere other than his triceps  He denied any diaphoresis, nausea/vomiting or palpitations during this time  Staff at his facility noted his blood pressure to be elevated 198/104 at the time  The patient was brought to the ED and requiring BiPAP secondary to increased work of breathing  Chest x-ray revealed pulmonary edema and IV Lasix was started  Patient was subsequently decreased down to 5 L nasal cannula as of this morning  EKG on admit was sinus tachycardia with nonspecific ST/T-wave changes noted    High sensitivity troponin were minimally elevated on admit but subsequently have risen and IV heparin drip was started by internal medicine team   Cardiology is consulted secondary to elevated troponins and pulmonary edema  At time of interview exam the patient is resting comfortably in bed with no acute complaints  He remains on 5 L nasal cannula with no acute complaints of shortness of breath at rest   The patient tells me he has no past cardiac history and does not believe he has ever been seen by cardiologist   Based on chart review it appears he had been seen by [facility] cardiology back in 2017 for apparent diagnosis of atrial tachycardia but it does not appear that he has followed with Cardiology thereafter  The patient tells me he does not ambulate very much at his assisted living facility secondary to his diagnosis of MS  He essentially needs assistance to stand and then pivot to be seated, he does not actively walk  The patient states that he has never had any kind of chest or arm discomfort in the past   He notes that his arm discomfort has currently resolved  EKG reviewed personally: EKG on admit sinus tachycardia with non-specific ST/T wave changes        Telemetry reviewed personally: sinus rhythm rates in the 70's        Review of Systems   Constitutional: Positive for malaise/fatigue  Negative for chills and fever  HENT: Negative for congestion  Cardiovascular: Positive for dyspnea on exertion  Negative for chest pain, leg swelling, orthopnea and palpitations  Respiratory: Negative for cough, shortness of breath (no SOB at rest) and wheezing  Endocrine: Negative  Hematologic/Lymphatic: Negative  Skin: Negative  Musculoskeletal: Negative  Gastrointestinal: Negative for bloating, abdominal pain, nausea and vomiting  Genitourinary: Negative  Neurological: Negative for dizziness and light-headedness  Psychiatric/Behavioral: Negative  All other systems reviewed and are negative      Historical Information   Past Medical History:   Diagnosis Date    Atrial fibrillation ([facility] [address] )     BPH (benign prostatic hyperplasia)     Congestive heart failure (CHF) (HCC)     Diabetes mellitus (HCC)     Hyperlipidemia     Hypertension     Multiple sclerosis (HCC)     Neuropathy     RLS (restless legs syndrome)      History reviewed  No pertinent surgical history  Social History     Substance and Sexual Activity   Alcohol Use Not Currently     Social History     Substance and Sexual Activity   Drug Use Never     Social History     Tobacco Use   Smoking Status Never Smoker   Smokeless Tobacco Never Used     Family History: History reviewed  No pertinent family history      Meds/Allergies   current meds:   Current Facility-Administered Medications   Medication Dose Route Frequency    aspirin (ECOTRIN LOW STRENGTH) EC tablet 81 mg  81 mg Oral Daily    baclofen tablet 10 mg  10 mg Oral Q6H PRN    cholestyramine sugar free (QUESTRAN LIGHT) packet 4 g  4 g Oral BID    clopidogrel (PLAVIX) tablet 75 mg  75 mg Oral Daily    furosemide (LASIX) injection 40 mg  40 mg Intravenous BID (diuretic)    gabapentin (NEURONTIN) capsule 300 mg  300 mg Oral BID    gabapentin (NEURONTIN) capsule 600 mg  600 mg Oral HS    heparin (porcine) 25,000 units in 0 45% NaCl 250 mL infusion (premix)  3-20 Units/kg/hr (Order-Specific) Intravenous Titrated    heparin (porcine) injection 2,000 Units  2,000 Units Intravenous Q1H PRN    heparin (porcine) injection 4,000 Units  4,000 Units Intravenous Q1H PRN    hydroxyurea (HYDREA) capsule 500 mg  500 mg Oral TID    insulin glargine (LANTUS) subcutaneous injection 20 Units 0 2 mL  20 Units Subcutaneous HS    insulin lispro (HumaLOG) 100 units/mL subcutaneous injection 1-5 Units  1-5 Units Subcutaneous HS    insulin lispro (HumaLOG) 100 units/mL subcutaneous injection 1-6 Units  1-6 Units Subcutaneous TID AC    insulin lispro (HumaLOG) 100 units/mL subcutaneous injection 5 Units  5 Units Subcutaneous TID With Meals    loratadine (CLARITIN) tablet 10 mg  10 mg Oral Daily    pantoprazole (PROTONIX) EC tablet 40 mg  40 mg Oral Daily Before Breakfast    rOPINIRole (REQUIP) tablet 0 5 mg  0 5 mg Oral HS    tamsulosin (FLOMAX) capsule 0 4 mg  0 4 mg Oral Daily With Dinner     heparin (porcine), 3-20 Units/kg/hr (Order-Specific), Last Rate: 11 1 Units/kg/hr (05/23/22 0237)        Allergies   Allergen Reactions    Metformin GI Intolerance       Objective   Vitals: Blood pressure 115/60, pulse 84, temperature 98 2 °F (36 8 °C), temperature source Oral, resp  rate (!) 24, height 5' 10" (1 778 m), weight 97 2 kg (214 lb 4 6 oz), SpO2 93 %  ,     Body mass index is 30 75 kg/m²  ,     Systolic (55ZOD), UTJ:185 , Min:115 , GPS:209     Diastolic (09RXZ), RXE:28, Min:57, Max:77    Wt Readings from Last 3 Encounters:   05/23/22 97 2 kg (214 lb 4 6 oz)   02/25/22 94 4 kg (208 lb 3 2 oz)   11/29/13 93 4 kg (206 lb)      Lab Results   Component Value Date    CREATININE 1 29 05/23/2022    CREATININE 1 18 05/22/2022             Intake/Output Summary (Last 24 hours) at 5/23/2022 0817  Last data filed at 5/23/2022 0427  Gross per 24 hour   Intake 78 33 ml   Output 1125 ml   Net -1046 67 ml     Weight (last 2 days)     Date/Time Weight    05/23/22 0533 97 2 (214 29)    05/23/22 0041 97 9 (215 83)    05/22/22 2132 94 3 (208)        Invasive Devices  Report    Peripheral Intravenous Line  Duration           Peripheral IV 05/23/22 Left;Proximal;Ventral (anterior) Forearm <1 day                  Physical Exam  Vitals reviewed  Constitutional:       General: He is not in acute distress  Appearance: He is obese  HENT:      Head: Normocephalic and atraumatic  Mouth/Throat:      Mouth: Mucous membranes are moist    Cardiovascular:      Rate and Rhythm: Normal rate and regular rhythm  Heart sounds: Normal heart sounds, S1 normal and S2 normal  No murmur heard  Pulmonary:      Effort: Pulmonary effort is normal  No respiratory distress        Breath sounds: Examination of the right-lower field reveals rales  Examination of the left-lower field reveals rales  Rales present  Abdominal:      General: Bowel sounds are normal  There is no distension  Palpations: Abdomen is soft  Musculoskeletal:         General: Normal range of motion  Cervical back: Normal range of motion and neck supple  Right lower leg: No edema  Left lower leg: No edema  Skin:     General: Skin is warm and dry  Neurological:      Mental Status: He is alert and oriented to person, place, and time     Psychiatric:         Mood and Affect: Mood normal            LABORATORY RESULTS:      CBC with diff:   Results from last 7 days   Lab Units 05/23/22 0241 05/22/22 2231 05/22/22 2150   WBC Thousand/uL 14 40* 14 28*  --    HEMOGLOBIN g/dL 12 9 12 6  --    I STAT HEMOGLOBIN g/dl  --   --  13 9   HEMATOCRIT % 41 8 41 5  --    HEMATOCRIT, ISTAT %  --   --  41   MCV fL 87 87  --    PLATELETS Thousands/uL 1,251* 1,156*  --    MCH pg 27 0 26 5*  --    MCHC g/dL 30 9* 30 4*  --    RDW % 19 8* 19 7*  --    MPV fL 10 6 10 8  --    NRBC AUTO /100 WBCs  --  0  --        CMP:  Results from last 7 days   Lab Units 05/23/22 0241 05/22/22 2150 05/22/22  2144   POTASSIUM mmol/L 3 7  --  3 6   CHLORIDE mmol/L 104  --  107   CO2 mmol/L 30  --  24   CO2, I-STAT mmol/L  --  24  --    BUN mg/dL 21  --  21   CREATININE mg/dL 1 29  --  1 18   GLUCOSE, ISTAT mg/dl  --  249*  --    CALCIUM mg/dL 8 6  --  8 4   AST U/L 31  --  28   ALT U/L 17  --  12   ALK PHOS U/L 114  --  121*   EGFR ml/min/1 73sq m 55  --  61       BMP:  Results from last 7 days   Lab Units 05/23/22 0241 05/22/22 2150 05/22/22  2144   POTASSIUM mmol/L 3 7  --  3 6   CHLORIDE mmol/L 104  --  107   CO2 mmol/L 30  --  24   CO2, I-STAT mmol/L  --  24  --    BUN mg/dL 21  --  21   CREATININE mg/dL 1 29  --  1 18   GLUCOSE, ISTAT mg/dl  --  249*  --    CALCIUM mg/dL 8 6  --  8 4          Lab Results   Component Value Date    NTBN 389 (H) 05/22/2022            Results from last 7 days   Lab Units 05/23/22  0241   MAGNESIUM mg/dL 2 3          Results from last 7 days   Lab Units 05/22/22  2231   HEMOGLOBIN A1C % 5 3              Results from last 7 days   Lab Units 05/22/22  2144   INR  1 13     Lipid Profile:   No results found for: CHOL  No results found for: HDL  No results found for: LDLCALC  No results found for: TRIG      Cardiac testing:   No results found for this or any previous visit  No results found for this or any previous visit  No valid procedures specified  No results found for this or any previous visit  Imaging: I have personally reviewed pertinent reports  No results found  Counseling / Coordination of Care  Total floor / unit time spent today 45 minutes  Greater than 50% of total time was spent with the patient and / or family counseling and / or coordination of care  A description of the counseling / coordination of care: Review of history, current assessment, development of a plan  Code Status: Level 3 - DNAR and DNI    ** Please Note: Dragon 360 Dictation voice to text software may have been used in the creation of this document   **

## 2022-05-23 NOTE — ASSESSMENT & PLAN NOTE
· Sirs criteria met on admission:  Tachycardia, tachypnea, and leukocytosis   · Suspect that sirs is secondary to acute pulmonary edema resulting in acute respiratory failure   · No signs or symptoms of acute bacterial infection  · Monitor off antibiotics   · Trend CBC and fever

## 2022-05-23 NOTE — ASSESSMENT & PLAN NOTE
· Noted on facility paperwork  · Echo in 2017 showing possible hypokinesis of anteroseptal and inferior walls  · Admission EKG with Q waves inferiorly

## 2022-05-23 NOTE — ASSESSMENT & PLAN NOTE
· Dyspnea x2 hours at facility with then increased work of breathing with BP of 198/104 at facility prompting ED evaluation   · Note - patient has been on continuous supplemental oxygen via nasal cannula over the last week due to dyspnea at facility and had been ordered morphine q 2 hours p r n  for dyspnea at facility  · Last echo 2017 showed "LVEF 55%  Possible mid to apical anteroseptal and inferior wall hypokinesis   Mild diastolic dysfunction "   · No home diuretics but documented hx of HF on facility papers    · CXR with pulmonary edema on my review   ·   · Placed on BiPAP initially and then weaned to 4L NC once there was decrease in work of breathing   · Given lasix 40mg IV - UOP at 450cc at 3 hours (suspect closer to 600 as pt had wet brief that was unable to be recorded)   · Lasix 40 IV BID ordered   · Update echo  · Cardiology consult  · Strict I/O and daily weights   · Fluid and sodium restriction   · Admit to SD2 due to initially requiring BiPAP

## 2022-05-23 NOTE — ASSESSMENT & PLAN NOTE
· Currently being worked up by GI due to 4-6 episodes of loose stool daily   · SIBO testing negative   · Stool studies including Cdiff have not yet been completed outpatient - stool ordered for inpatient and pt maintained on contact precautions until resulted

## 2022-05-23 NOTE — ED NOTES
Pt has a full wet brief and was changed to clean brief is on pt with a clean [address] Gilmer Caruso RN  05/23/22 0004

## 2022-05-23 NOTE — ASSESSMENT & PLAN NOTE
No results found for: HGBA1C    Recent Labs     05/23/22  0108   POCGLU 199*       Blood Sugar Average: Last 72 hrs:  (P) 199   · Home regimen:  Lantus 35 units HS, Humalog 12 units b i d  meals, and Humalog 10 units HS  · Obtain updated hemoglobin A1c  · Will start with Lantus 20 units HS and Humalog 5 units t i d  with meals along with SSI

## 2022-05-24 LAB
ANION GAP SERPL CALCULATED.3IONS-SCNC: 7 MMOL/L (ref 4–13)
APTT PPP: 102 SECONDS (ref 23–37)
APTT PPP: 69 SECONDS (ref 23–37)
APTT PPP: 70 SECONDS (ref 23–37)
BASOPHILS # BLD AUTO: 0.13 THOUSANDS/ΜL (ref 0–0.1)
BASOPHILS NFR BLD AUTO: 1 % (ref 0–1)
BUN SERPL-MCNC: 23 MG/DL (ref 5–25)
CALCIUM SERPL-MCNC: 8.6 MG/DL (ref 8.3–10.1)
CHLORIDE SERPL-SCNC: 105 MMOL/L (ref 100–108)
CHOLEST SERPL-MCNC: 177 MG/DL
CO2 SERPL-SCNC: 30 MMOL/L (ref 21–32)
CREAT SERPL-MCNC: 1.35 MG/DL (ref 0.6–1.3)
EOSINOPHIL # BLD AUTO: 0.8 THOUSAND/ΜL (ref 0–0.61)
EOSINOPHIL NFR BLD AUTO: 7 % (ref 0–6)
ERYTHROCYTE [DISTWIDTH] IN BLOOD BY AUTOMATED COUNT: 19.7 % (ref 11.6–15.1)
GFR SERPL CREATININE-BSD FRML MDRD: 52 ML/MIN/1.73SQ M
GLUCOSE SERPL-MCNC: 104 MG/DL (ref 65–140)
GLUCOSE SERPL-MCNC: 104 MG/DL (ref 65–140)
GLUCOSE SERPL-MCNC: 108 MG/DL (ref 65–140)
GLUCOSE SERPL-MCNC: 129 MG/DL (ref 65–140)
GLUCOSE SERPL-MCNC: 137 MG/DL (ref 65–140)
GLUCOSE SERPL-MCNC: 65 MG/DL (ref 65–140)
GLUCOSE SERPL-MCNC: 73 MG/DL (ref 65–140)
GLUCOSE SERPL-MCNC: 73 MG/DL (ref 65–140)
GLUCOSE SERPL-MCNC: 81 MG/DL (ref 65–140)
HCT VFR BLD AUTO: 38.7 % (ref 36.5–49.3)
HDLC SERPL-MCNC: 29 MG/DL
HGB BLD-MCNC: 11.8 G/DL (ref 12–17)
IMM GRANULOCYTES # BLD AUTO: 0.05 THOUSAND/UL (ref 0–0.2)
IMM GRANULOCYTES NFR BLD AUTO: 0 % (ref 0–2)
LDLC SERPL CALC-MCNC: 124 MG/DL (ref 0–100)
LYMPHOCYTES # BLD AUTO: 2.14 THOUSANDS/ΜL (ref 0.6–4.47)
LYMPHOCYTES NFR BLD AUTO: 19 % (ref 14–44)
MCH RBC QN AUTO: 26.5 PG (ref 26.8–34.3)
MCHC RBC AUTO-ENTMCNC: 30.5 G/DL (ref 31.4–37.4)
MCV RBC AUTO: 87 FL (ref 82–98)
MONOCYTES # BLD AUTO: 0.91 THOUSAND/ΜL (ref 0.17–1.22)
MONOCYTES NFR BLD AUTO: 8 % (ref 4–12)
NEUTROPHILS # BLD AUTO: 7.56 THOUSANDS/ΜL (ref 1.85–7.62)
NEUTS SEG NFR BLD AUTO: 65 % (ref 43–75)
NRBC BLD AUTO-RTO: 0 /100 WBCS
PLATELET # BLD AUTO: 1154 THOUSANDS/UL (ref 149–390)
PMV BLD AUTO: 10.6 FL (ref 8.9–12.7)
POTASSIUM SERPL-SCNC: 3.2 MMOL/L (ref 3.5–5.3)
RBC # BLD AUTO: 4.45 MILLION/UL (ref 3.88–5.62)
SODIUM SERPL-SCNC: 142 MMOL/L (ref 136–145)
TRIGL SERPL-MCNC: 122 MG/DL
WBC # BLD AUTO: 11.59 THOUSAND/UL (ref 4.31–10.16)

## 2022-05-24 PROCEDURE — 85730 THROMBOPLASTIN TIME PARTIAL: CPT | Performed by: HOSPITALIST

## 2022-05-24 PROCEDURE — 99232 SBSQ HOSP IP/OBS MODERATE 35: CPT | Performed by: HOSPITALIST

## 2022-05-24 PROCEDURE — 85730 THROMBOPLASTIN TIME PARTIAL: CPT | Performed by: NURSE PRACTITIONER

## 2022-05-24 PROCEDURE — 80048 BASIC METABOLIC PNL TOTAL CA: CPT | Performed by: NURSE PRACTITIONER

## 2022-05-24 PROCEDURE — 99232 SBSQ HOSP IP/OBS MODERATE 35: CPT | Performed by: INTERNAL MEDICINE

## 2022-05-24 PROCEDURE — 85730 THROMBOPLASTIN TIME PARTIAL: CPT | Performed by: INTERNAL MEDICINE

## 2022-05-24 PROCEDURE — 85025 COMPLETE CBC W/AUTO DIFF WBC: CPT | Performed by: INTERNAL MEDICINE

## 2022-05-24 PROCEDURE — 82948 REAGENT STRIP/BLOOD GLUCOSE: CPT

## 2022-05-24 PROCEDURE — 80061 LIPID PANEL: CPT | Performed by: NURSE PRACTITIONER

## 2022-05-24 RX ORDER — POTASSIUM CHLORIDE 20 MEQ/1
40 TABLET, EXTENDED RELEASE ORAL DAILY
Status: DISCONTINUED | OUTPATIENT
Start: 2022-05-24 | End: 2022-05-26 | Stop reason: HOSPADM

## 2022-05-24 RX ORDER — METOPROLOL SUCCINATE 25 MG/1
25 TABLET, EXTENDED RELEASE ORAL DAILY
Status: DISCONTINUED | OUTPATIENT
Start: 2022-05-24 | End: 2022-05-26 | Stop reason: HOSPADM

## 2022-05-24 RX ORDER — POTASSIUM CHLORIDE 20 MEQ/1
40 TABLET, EXTENDED RELEASE ORAL ONCE
Status: COMPLETED | OUTPATIENT
Start: 2022-05-24 | End: 2022-05-24

## 2022-05-24 RX ADMIN — CHOLESTYRAMINE 4 G: 4 POWDER, FOR SUSPENSION ORAL at 08:32

## 2022-05-24 RX ADMIN — HYDROXYUREA 500 MG: 500 CAPSULE ORAL at 08:33

## 2022-05-24 RX ADMIN — GABAPENTIN 300 MG: 300 CAPSULE ORAL at 17:31

## 2022-05-24 RX ADMIN — CHOLESTYRAMINE 4 G: 4 POWDER, FOR SUSPENSION ORAL at 17:31

## 2022-05-24 RX ADMIN — CLOPIDOGREL BISULFATE 75 MG: 75 TABLET ORAL at 08:31

## 2022-05-24 RX ADMIN — LORATADINE 10 MG: 10 TABLET ORAL at 08:31

## 2022-05-24 RX ADMIN — INSULIN LISPRO 5 UNITS: 100 INJECTION, SOLUTION INTRAVENOUS; SUBCUTANEOUS at 11:13

## 2022-05-24 RX ADMIN — HYDROXYUREA 500 MG: 500 CAPSULE ORAL at 21:03

## 2022-05-24 RX ADMIN — TAMSULOSIN HYDROCHLORIDE 0.4 MG: 0.4 CAPSULE ORAL at 16:23

## 2022-05-24 RX ADMIN — FUROSEMIDE 40 MG: 10 INJECTION, SOLUTION INTRAMUSCULAR; INTRAVENOUS at 08:31

## 2022-05-24 RX ADMIN — ATORVASTATIN CALCIUM 40 MG: 40 TABLET, FILM COATED ORAL at 16:23

## 2022-05-24 RX ADMIN — GABAPENTIN 600 MG: 300 CAPSULE ORAL at 21:03

## 2022-05-24 RX ADMIN — INSULIN LISPRO 5 UNITS: 100 INJECTION, SOLUTION INTRAVENOUS; SUBCUTANEOUS at 07:21

## 2022-05-24 RX ADMIN — HEPARIN SODIUM 13.1 UNITS/KG/HR: 10000 INJECTION, SOLUTION INTRAVENOUS at 22:23

## 2022-05-24 RX ADMIN — FUROSEMIDE 40 MG: 10 INJECTION, SOLUTION INTRAMUSCULAR; INTRAVENOUS at 16:23

## 2022-05-24 RX ADMIN — INSULIN GLARGINE 20 UNITS: 100 INJECTION, SOLUTION SUBCUTANEOUS at 21:02

## 2022-05-24 RX ADMIN — HEPARIN SODIUM 15.1 UNITS/KG/HR: 10000 INJECTION, SOLUTION INTRAVENOUS at 02:08

## 2022-05-24 RX ADMIN — ROPINIROLE HYDROCHLORIDE 0.5 MG: 0.25 TABLET, FILM COATED ORAL at 21:03

## 2022-05-24 RX ADMIN — GABAPENTIN 300 MG: 300 CAPSULE ORAL at 08:31

## 2022-05-24 RX ADMIN — POTASSIUM CHLORIDE 40 MEQ: 1500 TABLET, EXTENDED RELEASE ORAL at 06:12

## 2022-05-24 RX ADMIN — HYDROXYUREA 500 MG: 500 CAPSULE ORAL at 16:23

## 2022-05-24 RX ADMIN — PANTOPRAZOLE SODIUM 40 MG: 40 TABLET, DELAYED RELEASE ORAL at 08:31

## 2022-05-24 RX ADMIN — POTASSIUM CHLORIDE 40 MEQ: 1500 TABLET, EXTENDED RELEASE ORAL at 11:13

## 2022-05-24 RX ADMIN — METOPROLOL SUCCINATE 25 MG: 25 TABLET, FILM COATED, EXTENDED RELEASE ORAL at 11:13

## 2022-05-24 RX ADMIN — INSULIN LISPRO 5 UNITS: 100 INJECTION, SOLUTION INTRAVENOUS; SUBCUTANEOUS at 16:23

## 2022-05-24 RX ADMIN — ASPIRIN 81 MG: 81 TABLET, COATED ORAL at 08:31

## 2022-05-24 NOTE — PLAN OF CARE
Problem: Potential for Falls  Goal: Patient will remain free of falls  Description: INTERVENTIONS:  - Educate patient/family on patient safety including physical limitations  - Instruct patient to call for assistance with activity   - Consult OT/PT to assist with strengthening/mobility   - Keep Call bell within reach  - Keep bed low and locked with side rails adjusted as appropriate  - Keep care items and personal belongings within reach  - Initiate and maintain comfort rounds  - Make Fall Risk Sign visible to staff  - Offer Toileting every 5 Hours, in advance of need  - Initiate/Maintain bed alarm  - Obtain necessary fall risk management equipment:   Problem: Prexisting or High Potential for Compromised Skin Integrity  Goal: Skin integrity is maintained or improved  Description: INTERVENTIONS:  - Identify patients at risk for skin breakdown  - Assess and monitor skin integrity  - Assess and monitor nutrition and hydration status  - Monitor labs   - Assess for incontinence   - Turn and reposition patient  - Assist with mobility/ambulation  - Relieve pressure over bony prominences  - Avoid friction and shearing  - Provide appropriate hygiene as needed including keeping skin clean and dry  - Evaluate need for skin moisturizer/barrier cream  - Collaborate with interdisciplinary team   - Patient/family teaching  - Consider wound care consult   Outcome: Progressing     Problem: METABOLIC, FLUID AND ELECTROLYTES - ADULT  Goal: Fluid balance maintained  Description: INTERVENTIONS:  - Monitor labs   - Monitor I/O and WT  - Instruct patient on fluid and nutrition as appropriate  - Assess for signs & symptoms of volume excess or deficit  Outcome: Progressing     - Apply yellow socks and bracelet for high fall risk patients  - Consider moving patient to room near nurses station  Outcome: Progressing

## 2022-05-24 NOTE — PROGRESS NOTES
General Cardiology   Progress Note -  Team One   Lisandra Alexander 70 y o  male MRN: [id number]    Unit/Bed#: -01 SDU Encounter: [id number]    Assessment:    Acute hypoxic respiratory failure  · To the ED from [facility] with c/o B/L arm pain and dyspnea that worsened when being moved to bathroom by staff  · Of note; patient has been on 4 L NC at his nursing facility and has been receiving morphine for "dyspnea" for at least the past 2 weeks; unclear if the etiology behind this has been worked up; may have been worsening pulm edema  · requiring BIPAP on admit due to WOB; now down to 4 L NC   · Chest xray with evidence of pulm edema by my review     Acute systolic/diastolic HF  · Presentation and chest xray as above; BP at nursing facility reported as 198/104 PTA  · proBNP 389  · IV lasix 40 mg BID currently ordered; his creat has increased to 1 3 today up from 1 2 yesterday and 1 1 on admit but he continues with bibasilar rales so we will continue IV lasix 40 mg BID this AM and continue to monitor creat closely  · I/O: total UO in 24 hrs is 2 1 L; net negative 1 1 L in 24 hrs  · May have been flash pulm edema due to accelerated HTN but given evidence of reduced EF and dyspnea for at least the past 2 weeks, likely this represents CHF  · TTE from yesterday: EF 45%; akinesis of the apical anterior wall and apex; hypokinesis of the mid anterior wall; grade 2 DD; mild TR  · Daily weights; strict I/O's; 2 g Na restricted diet with fluid restriction     Elevated troponin with B/L arm pain  · Presentation as above; has had BARCENAS x 2 weeks but noted B/L UE "tingling" in his triceps with exertion; no c/o CP  · EKG on admit sinus tachycardia with non-specific ST/T wave changes  · hsTn 448->663->1,090->1,439  · IV heparin drip started by SLIM per ACS protocol  · It is possible that his troponin are elevated due to accelerated HTN and flash pulm edema on admit BUT given his risk factors for CAD (age/fam hx of CAD/HTN/DM2 ) in conjunction with recent BARCENAS and B/L tricep "tingling" with exertion PTA, there could be concern for NSTEMI; to err on the side of caution, we will continue IV heparin drip for 48 hrs and treat patient medically for presumed CAD; patient does not wish to pursue cardiac cath and would rather medical management   · Continue aspirin 81 mg QD; Toprol XL 25 mg QD and Lipitor 40 mg QD started yesterday, continue outpatient dose of Plavix 75 mg QD    Cardiomyopathy  · TTE as above; EF 45% (had been 55% in 2017); etiology likely ischemic given wall motion abnormalities  · Patient wishes to pursue medical management and does not want to pursue cardiac catheterization  · GDMT: Toprol XL 25 mg QD; will add ACE/ARB by discharge     Essential HTN  · SBP averaging 110's-120's; HTN listed in [address]  · Not on any anti-hypertensives as outpatient  · Toprol XL 25 mg QD started yesterday due to probable underlying CAD     CAD  · Noted on paperwork from facility; no prior documentation  · GDMT: aspirin 81 mg QD, Plavix 75 mg QD;  Toprol XL 25 mg QD and Lipitor 40 mg QD started yesterday     DM2  · HgbA1c 5 3  · Management per primary team     thrombocytosis  · PLT 1156 on admit; prior hx of thrombocytosis noted 2017  · On ASA/Plavix as outpatient  · Hematology consulted     Multiple sclerosis       Plan/Recommendations:  · Continue IV heparin for 48 hrs (d/c 0200 tomorrow AM)  · Continue aspirin 81 mg QD, Toprol XL 25 mg QD, Lipitor 40 mg QD and Plavix 75mg QD  · Will add ACE/ARB by discharge for GDMT for CM  · IV lasix 40 mg BID currently ordered; his creat has increased to 1 3 today up from 1 2 yesterday and 1 1 on admit but he continues with bibasilar rales so we will continue IV lasix 40 mg BID this AM and continue to monitor creat closely    _________________________________________________________________    Subjective  Patient notes cough this morning but denies any shortness of breath at rest   No further episodes of bilateral arm discomfort since admission  Review of Systems   Constitutional: Negative for chills, fever and malaise/fatigue  HENT: Negative for congestion  Cardiovascular: Negative for chest pain, dyspnea on exertion, leg swelling, orthopnea and palpitations  Respiratory: Positive for cough  Negative for shortness of breath (no SOB at rest) and wheezing  Endocrine: Negative  Hematologic/Lymphatic: Negative  Skin: Negative  Musculoskeletal: Negative  Gastrointestinal: Negative for bloating, abdominal pain, nausea and vomiting  Genitourinary: Negative  Neurological: Negative for dizziness and light-headedness  Psychiatric/Behavioral: Negative  All other systems reviewed and are negative  Objective:   Vitals: Blood pressure 117/66, pulse 82, temperature 98 6 °F (37 °C), temperature source Oral, resp  rate 19, height 5' 10" (1 778 m), weight 90 1 kg (198 lb 10 2 oz), SpO2 95 %  ,     Wt Readings from Last 3 Encounters:   05/24/22 90 1 kg (198 lb 10 2 oz)   02/25/22 94 4 kg (208 lb 3 2 oz)   11/29/13 93 4 kg (206 lb)        Lab Results   Component Value Date    CREATININE 1 35 (H) 05/24/2022    CREATININE 1 29 05/23/2022    CREATININE 1 18 05/22/2022         Body mass index is 28 5 kg/m²  ,     Systolic (25XUK), NPC:335 , Min:95 , HHD:583     Diastolic (71ARI), LAQ:72, Min:55, Max:66          Intake/Output Summary (Last 24 hours) at 5/24/2022 0811  Last data filed at 5/24/2022 0601  Gross per 24 hour   Intake 1022 44 ml   Output 2125 ml   Net -1102 56 ml     Weight (last 2 days)     Date/Time Weight    05/24/22 0600 90 1 (198 63)    05/23/22 0830 97 1 (214)    05/23/22 0533 97 2 (214 29)    05/23/22 0041 97 9 (215 83)    05/22/22 2132 94 3 (208)            Telemetry Review: sinus rhythm with rates in the 80's        Physical Exam  Vitals reviewed  Constitutional:       General: He is not in acute distress  HENT:      Head: Normocephalic and atraumatic        Mouth/Throat:      Mouth: Mucous membranes are moist    Cardiovascular:      Rate and Rhythm: Normal rate and regular rhythm  Heart sounds: Normal heart sounds, S1 normal and S2 normal  No murmur heard  Pulmonary:      Effort: Pulmonary effort is normal  No respiratory distress  Breath sounds: Examination of the right-lower field reveals rales  Examination of the left-lower field reveals rales  Rales present  Abdominal:      General: Bowel sounds are normal  There is no distension  Palpations: Abdomen is soft  Musculoskeletal:         General: Normal range of motion  Cervical back: Normal range of motion and neck supple  Right lower leg: No edema  Left lower leg: No edema  Skin:     General: Skin is warm and dry  Neurological:      Mental Status: He is alert and oriented to person, place, and time     Psychiatric:         Mood and Affect: Mood normal          LABORATORY RESULTS      CBC with diff:   Results from last 7 days   Lab Units 05/23/22  0241 05/22/22 2231 05/22/22 2150   WBC Thousand/uL 14 40* 14 28*  --    HEMOGLOBIN g/dL 12 9 12 6  --    I STAT HEMOGLOBIN g/dl  --   --  13 9   HEMATOCRIT % 41 8 41 5  --    HEMATOCRIT, ISTAT %  --   --  41   MCV fL 87 87  --    PLATELETS Thousands/uL 1,251* 1,156*  --    MCH pg 27 0 26 5*  --    MCHC g/dL 30 9* 30 4*  --    RDW % 19 8* 19 7*  --    MPV fL 10 6 10 8  --    NRBC AUTO /100 WBCs  --  0  --        CMP:  Results from last 7 days   Lab Units 05/24/22  0159 05/23/22  0241 05/22/22  2150 05/22/22  2144   POTASSIUM mmol/L 3 2* 3 7  --  3 6   CHLORIDE mmol/L 105 104  --  107   CO2 mmol/L 30 30  --  24   CO2, I-STAT mmol/L  --   --  24  --    BUN mg/dL 23 21  --  21   CREATININE mg/dL 1 35* 1 29  --  1 18   GLUCOSE, ISTAT mg/dl  --   --  249*  --    CALCIUM mg/dL 8 6 8 6  --  8 4   AST U/L  --  31  --  28   ALT U/L  --  17  --  12   ALK PHOS U/L  --  114  --  121*   EGFR ml/min/1 73sq m 52 55  --  61       BMP:  Results from last 7 days   Lab Units 05/24/22  0159 05/23/22  0241 05/22/22  2150 05/22/22  2144   POTASSIUM mmol/L 3 2* 3 7  --  3 6   CHLORIDE mmol/L 105 104  --  107   CO2 mmol/L 30 30  --  24   CO2, I-STAT mmol/L  --   --  24  --    BUN mg/dL 23 21  --  21   CREATININE mg/dL 1 35* 1 29  --  1 18   GLUCOSE, ISTAT mg/dl  --   --  249*  --    CALCIUM mg/dL 8 6 8 6  --  8 4       Lab Results   Component Value Date    NTBNP 389 (H) 05/22/2022             Results from last 7 days   Lab Units 05/23/22  0241   MAGNESIUM mg/dL 2 3          Results from last 7 days   Lab Units 05/22/22  2231   HEMOGLOBIN A1C % 5 3              Results from last 7 days   Lab Units 05/22/22  2144   INR  1 13       Lipid Profile:   No results found for: CHOL  Lab Results   Component Value Date    HDL 29 (L) 05/24/2022     Lab Results   Component Value Date    LDLCALC 124 (H) 05/24/2022     Lab Results   Component Value Date    TRIG 122 05/24/2022       Cardiac testing:   No results found for this or any previous visit  No results found for this or any previous visit  No results found for this or any previous visit  No valid procedures specified  No results found for this or any previous visit          Meds/Allergies   current meds:   Current Facility-Administered Medications   Medication Dose Route Frequency    aspirin (ECOTRIN LOW STRENGTH) EC tablet 81 mg  81 mg Oral Daily    atorvastatin (LIPITOR) tablet 40 mg  40 mg Oral Daily With Dinner    baclofen tablet 10 mg  10 mg Oral Q6H PRN    cholestyramine sugar free (QUESTRAN LIGHT) packet 4 g  4 g Oral BID    clopidogrel (PLAVIX) tablet 75 mg  75 mg Oral Daily    furosemide (LASIX) injection 40 mg  40 mg Intravenous BID (diuretic)    gabapentin (NEURONTIN) capsule 300 mg  300 mg Oral BID    gabapentin (NEURONTIN) capsule 600 mg  600 mg Oral HS    heparin (porcine) 25,000 units in 0 45% NaCl 250 mL infusion (premix)  3-20 Units/kg/hr (Order-Specific) Intravenous Titrated    heparin (porcine) injection 2,000 Units  2,000 Units Intravenous Q1H PRN    heparin (porcine) injection 4,000 Units  4,000 Units Intravenous Q1H PRN    hydroxyurea (HYDREA) capsule 500 mg  500 mg Oral TID    insulin glargine (LANTUS) subcutaneous injection 20 Units 0 2 mL  20 Units Subcutaneous HS    insulin lispro (HumaLOG) 100 units/mL subcutaneous injection 1-5 Units  1-5 Units Subcutaneous HS    insulin lispro (HumaLOG) 100 units/mL subcutaneous injection 1-6 Units  1-6 Units Subcutaneous TID AC    insulin lispro (HumaLOG) 100 units/mL subcutaneous injection 5 Units  5 Units Subcutaneous TID With Meals    loratadine (CLARITIN) tablet 10 mg  10 mg Oral Daily    pantoprazole (PROTONIX) EC tablet 40 mg  40 mg Oral Daily Before Breakfast    rOPINIRole (REQUIP) tablet 0 5 mg  0 5 mg Oral HS    tamsulosin (FLOMAX) capsule 0 4 mg  0 4 mg Oral Daily With Dinner     Medications Prior to Admission   Medication    acetaminophen (TYLENOL) 325 mg tablet    aspirin (ECOTRIN LOW STRENGTH) 81 mg EC tablet    baclofen 10 mg tablet    Cholecalciferol (Vitamin D3) 1 25 MG (85510 UT) CAPS    cholestyramine sugar free (QUESTRAN LIGHT) 4 g packet    clopidogrel (PLAVIX) 75 mg tablet    gabapentin (NEURONTIN) 300 mg capsule    gabapentin (NEURONTIN) 600 MG tablet    insulin glargine (LANTUS) 100 units/mL subcutaneous injection    insulin lispro (HumaLOG) 100 units/mL injection    insulin lispro (insulin lispro) 100 units/mL injection    loperamide (IMODIUM) 2 mg capsule    loratadine (CLARITIN) 10 mg tablet    morphine 20 MG/5ML solution    pantoprazole (PROTONIX) 40 mg tablet    polyethylene glycol-propylene glycol (Systane) 0 4-0 3 %    rOPINIRole (REQUIP) 0 5 mg tablet    tamsulosin (FLOMAX) 0 4 mg    bisacodyl (Dulcolax) 10 mg suppository    EMOLLIENT CREAM & WOUND GEL EX    Glucagon, rDNA, (Glucagon Emergency) 1 MG KIT    magnesium hydroxide (MILK OF MAGNESIA) 400 mg/5 mL oral suspension    polyethylene glycol (GOLYTELY) 4000 mL solution    Probiotic Product (PROBIOTIC PO)    sodium phosphate-biphosphate (FLEET) 7-19 g 118 mL enema    white petrolatum-corn starch-lanolin (TRIPLE PASTE) 12 8 % ointment       heparin (porcine), 3-20 Units/kg/hr (Order-Specific), Last Rate: 13 1 Units/kg/hr (05/24/22 0319)        Counseling / Coordination of Care  Total floor / unit time spent today 20 minutes  Greater than 50% of total time was spent with the patient and / or family counseling and / or coordination of care  ** Please Note: Dragon 360 Dictation voice to text software may have been used in the creation of this document   **

## 2022-05-24 NOTE — ASSESSMENT & PLAN NOTE
· Currently being worked up by GI due to 4-6 episodes of loose stool daily   · SIBO testing negative

## 2022-05-24 NOTE — ASSESSMENT & PLAN NOTE
· Presented to the ED with significant work of breathing and rales on auscultation   · Pt placed on BiPAP and given lasix 40IV x1 with significant improvement in clinical appearance  · He was able to be weaned off of BiPAP and placed on 4L NC with SpO2 of 94% and decreased work of breathing  · Suspected etiology is acute pulmonary edema based on CXR findings and reported significant HTN at facility of 198/104   · Weaned to 4L which is baseline

## 2022-05-24 NOTE — ASSESSMENT & PLAN NOTE
· Platelets at 0627 on admission   · Prior thrombocytosis noted in 2017 at 700   · Pt on ASA and plavix already   · Hematology consult appreciated - hydrea started  · No acute need for pheresis per their report

## 2022-05-24 NOTE — PROGRESS NOTES
[place]  Progress Note - Bruna Angel 1950, 70 y o  male MRN: [id number]  Unit/Bed#: -01 SDU Encounter: [id number]  Primary Care Provider: Leti Hoffman MD   Date and time admitted to hospital: 5/22/2022  9:27 PM    SIRS (systemic inflammatory response syndrome) ([facility] [address] )  Assessment & Plan  · Sirs criteria met on admission:  Tachycardia, tachypnea, and leukocytosis   · Suspect that sirs is secondary to acute pulmonary edema resulting in acute respiratory failure   · No signs or symptoms of acute bacterial infection  · Monitor off antibiotics   · Trend CBC and fever    Thrombocytosis  Assessment & Plan  · Platelets at 0600 on admission   · Prior thrombocytosis noted in 2017 at 700   · Pt on ASA and plavix already   · Hematology consult appreciated - hydrea started  · No acute need for pheresis per their report    Type 2 diabetes mellitus, with long-term current use of insulin [facility])  Assessment & Plan  Lab Results   Component Value Date    HGBA1C 5 3 05/22/2022       Recent Labs     05/24/22  0234 05/24/22  0555 05/24/22  0614 05/24/22  0719   POCGLU 104 65 104 129       Blood Sugar Average: Last 72 hrs:  (P) 114 2   · Home regimen:  Lantus 35 units HS, Humalog 12 units b i d  meals, and Humalog 10 units HS  · Obtain updated hemoglobin A1c  · cont Lantus 20 units HS and Humalog 5 units t i d  with meals along with SSI    Elevated troponin  Assessment & Plan  · HS troponin: 448 < 663 <1090  · Suspect that this is secondary to non mi troponin elevation 2/2 acute heart failure  · Initially held on IV heparin, however once 4 hour returned at 1090 - heparin gtt initiated x 48h  · Cardiology following medical management only per patient/family wishes    Coronary artery disease involving native coronary artery  Assessment & Plan  · Noted on facility paperwork  · Echo in 2017 showing possible hypokinesis of anteroseptal and inferior walls  · Admission EKG with Q waves inferiorly Acute respiratory failure ([address] )  Assessment & Plan  · Presented to the ED with significant work of breathing and rales on auscultation   · Pt placed on BiPAP and given lasix 40IV x1 with significant improvement in clinical appearance  · He was able to be weaned off of BiPAP and placed on 4L NC with SpO2 of 94% and decreased work of breathing  · Suspected etiology is acute pulmonary edema based on CXR findings and reported significant HTN at facility of 198/104   · Weaned to 4L which is baseline  HTN (hypertension)  Assessment & Plan  · No home anti-HTN on facility list  · Hypertensive at facility with symptom onset   · SBP ranging 120-150s inpatient   · Will hold on initiating anti-HTN to allow for IV diuresis     Multiple sclerosis (HCC)  Assessment & Plan  · Continue baclofen 10mg PRN and requip HS     Diarrhea  Assessment & Plan  · Currently being worked up by GI due to 4-6 episodes of loose stool daily   · SIBO testing negative     * Acute pulmonary edema (HCC)  Assessment & Plan  · Dyspnea x2 hours at facility with then increased work of breathing with BP of 198/104 at facility prompting ED evaluation   · Note - patient has been on continuous supplemental oxygen via nasal cannula over the last week due to dyspnea at facility and had been ordered morphine q 2 hours p r n  for dyspnea at facility  · Last echo 2017 showed "LVEF 55%  Possible mid to apical anteroseptal and inferior wall hypokinesis  Mild diastolic dysfunction "   · No home diuretics but documented hx of HF on facility papers    · CXR with pulmonary edema on my review   ·   · Placed on BiPAP initially and then weaned to 4L NC once there was decrease in work of breathing   · Given lasix 40mg IV - UOP at 450cc at 3 hours (suspect closer to 600 as pt had wet brief that was unable to be recorded)   · Cont diuresis as noted by cardiology  · Update echo - EF 45%, patient prefers only medical management  · Cardiology consult appreciated  · Strict I/O and daily weights   · Fluid and sodium restriction   · Transfer out of SD2       VTE  Prophylaxis:   Pharmacologic: in place    Patient Centered Rounds: I have performed bedside rounds with nursing staff today  Discussions with Specialists or Other Care Team Provider: case management    Education and Discussions with Family / Patient: pt spouse    Current Length of Stay: 2 day(s)    Current Patient Status: Inpatient        Code Status: Level 3 - DNAR and DNI      Subjective:      Pt seen  Denies sob  Reports breathing better    Patient is seen and examined at bedside  All other ROS are negative  Objective:     Vitals:   Temp (24hrs), Av 2 °F (36 8 °C), Min:97 7 °F (36 5 °C), Max:98 6 °F (37 °C)    Temp:  [97 7 °F (36 5 °C)-98 6 °F (37 °C)] 98 6 °F (37 °C)  HR:  [74-85] 82  Resp:  [16-32] 19  BP: ()/(55-66) 117/66  SpO2:  [94 %-99 %] 95 %  Body mass index is 28 5 kg/m²  Input and Output Summary (last 24 hours):        Intake/Output Summary (Last 24 hours) at 2022 1009  Last data filed at 2022 0831  Gross per 24 hour   Intake 1285 84 ml   Output 2125 ml   Net -839 16 ml       Physical Exam:       GEN: No acute distress, comfortable elderly male  HEEENT: No JVD, PERRLA, no scleral icterus, on o2  RESP: Lungs rales  CV: RRR, +s1/s2   ABD: SOFT NON TENDER, POSITIVE BOWEL SOUNDS, NO DISTENTION  PSYCH: CALM  NEURO:  NO FOCAL DEFICITS  SKIN: NO RASH  EXTREM: NO EDEMA    Additional Data:     Labs:    Results from last 7 days   Lab Units 22  0241 22  2231   WBC Thousand/uL 14 40* 14 28*   HEMOGLOBIN g/dL 12 9 12 6   HEMATOCRIT % 41 8 41 5   PLATELETS Thousands/uL 1,251* 1,156*   NEUTROS PCT %  --  82*   LYMPHS PCT %  --  6*   MONOS PCT %  --  7   EOS PCT %  --  3     Results from last 7 days   Lab Units 22  0159 22  0241   SODIUM mmol/L 142 142   POTASSIUM mmol/L 3 2* 3 7   CHLORIDE mmol/L 105 104   CO2 mmol/L 30 30   BUN mg/dL 23 21   CREATININE mg/dL 1 35* 1  [place] 6 mmol/L 7 8   CALCIUM mg/dL 8 6 8 6   ALBUMIN g/dL  --  3 4*   TOTAL BILIRUBIN mg/dL  --  0 40   ALK PHOS U/L  --  114   ALT U/L  --  17   AST U/L  --  31   GLUCOSE RANDOM mg/dL 73 192*     Results from last 7 days   Lab Units 05/22/22  2144   INR  1 13     Results from last 7 days   Lab Units 05/24/22  0719 05/24/22  0614 05/24/22  0555 05/24/22  0234 05/24/22  0204 05/23/22  2153 05/23/22  1539 05/23/22  1102 05/23/22  0740 05/23/22  0108   POC GLUCOSE mg/dl 129 104 65 104 73 88 109 149* 122 199*     Results from last 7 days   Lab Units 05/22/22  2231   HEMOGLOBIN A1C % 5 3               * I Have Reviewed All Lab Data Listed Above  Imaging:     Results for orders placed during the hospital encounter of 05/22/22    X-ray chest 1 view portable    Narrative  CHEST    INDICATION:   CHF  COMPARISON:  08/03/2009    EXAM PERFORMED/VIEWS:  XR CHEST PORTABLE  1 image    FINDINGS:    Cardiomediastinal silhouette appears unremarkable  The patient appears to be in congestive heart failure  The lungs are clear  No pneumothorax or pleural effusion  Osseous structures appear within normal limits for patient age  Impression  Congestive heart failure  Workstation performed: [id number]    No results found for this or any previous visit        *I have reviewed all imaging reports listed above      Recent Cultures (last 7 days):           Last 24 Hours Medication List:   Current Facility-Administered Medications   Medication Dose Route Frequency Provider Last Rate    aspirin  81 mg Oral Daily Figueroa High PA-C      atorvastatin  40 mg Oral Daily With CATARINO Raymond      baclofen  10 mg Oral Q6H PRN Figueroa High PA-C      cholestyramine sugar free  4 g Oral BID Figueroa High PA-C      clopidogrel  75 mg Oral Daily Figueroa High PA-C      furosemide  40 mg Intravenous BID (diuretic) Figueroa High PA-C      gabapentin  300 mg Oral BID Pearlean Soda Jess [place] PA-C      gabapentin  600 mg Oral HS Shayy Wright PA-C      heparin (porcine)  3-20 Units/kg/hr (Order-Specific) Intravenous Titrated Shayy Wright PA-C 11 8 Units/kg/hr (05/24/22 0952)    heparin (porcine)  2,000 Units Intravenous Q1H PRN MAE Caputo-RAMÓN      heparin (porcine)  4,000 Units Intravenous Q1H PRN Shayy Wright PA-C      hydroxyurea  500 mg Oral TID Yang Adame DO      insulin glargine  20 Units Subcutaneous HS Shayy Wright PA-C      insulin lispro  1-5 Units Subcutaneous HS Shayy Wright PA-C      insulin lispro  1-6 Units Subcutaneous TID AC Shayy Wright PA-C      insulin lispro  5 Units Subcutaneous TID With Meals Shayy Wright PA-C      loratadine  10 mg Oral Daily Shayy Wright PA-C      pantoprazole  40 mg Oral Daily Before Breakfast Shayy Wright PA-C      rOPINIRole  0 5 mg Oral HS Shayy Wright PA-C      tamsulosin  0 4 mg Oral Daily With [facility]          Today, Patient Was Seen By: Estuardo Conley MD    ** Please Note: Dictation voice to text software may have been used in the creation of this document   **

## 2022-05-24 NOTE — ASSESSMENT & PLAN NOTE
Lab Results   Component Value Date    HGBA1C 5 3 05/22/2022       Recent Labs     05/24/22  0234 05/24/22  0555 05/24/22  0614 05/24/22  0719   POCGLU 104 65 104 129       Blood Sugar Average: Last 72 hrs:  (P) 114 2   · Home regimen:  Lantus 35 units HS, Humalog 12 units b i d  meals, and Humalog 10 units HS  · Obtain updated hemoglobin A1c  · cont Lantus 20 units HS and Humalog 5 units t i d  with meals along with SSI

## 2022-05-24 NOTE — ASSESSMENT & PLAN NOTE
· HS troponin: 448 < 663 <1090  · Suspect that this is secondary to non mi troponin elevation 2/2 acute heart failure  · Initially held on IV heparin, however once 4 hour returned at 1090 - heparin gtt initiated x 48h  · Cardiology following medical management only per patient/family wishes

## 2022-05-24 NOTE — PLAN OF CARE
Problem: Potential for Falls  Goal: Patient will remain free of falls  Description: INTERVENTIONS:  - Educate patient/family on patient safety including physical limitations  - Instruct patient to call for assistance with activity   - Consult OT/PT to assist with strengthening/mobility   - Keep Call bell within reach  - Keep bed low and locked with side rails adjusted as appropriate  - Keep care items and personal belongings within reach  - Initiate and maintain comfort rounds  - Make Fall Risk Sign visible to staff  - Apply yellow socks and bracelet for high fall risk patients  - Consider moving patient to room near nurses station  Outcome: Progressing     Problem: MOBILITY - ADULT  Goal: Maintain or return to baseline ADL function  Description: INTERVENTIONS:  -  Assess patient's ability to carry out ADLs; assess patient's baseline for ADL function and identify physical deficits which impact ability to perform ADLs (bathing, care of mouth/teeth, toileting, grooming, dressing, etc )  - Assess/evaluate cause of self-care deficits   - Assess range of motion  - Assess patient's mobility; develop plan if impaired  - Assess patient's need for assistive devices and provide as appropriate  - Encourage maximum independence but intervene and supervise when necessary  - Involve family in performance of ADLs  - Assess for home care needs following discharge   - Consider OT consult to assist with ADL evaluation and planning for discharge  - Provide patient education as appropriate  Outcome: Progressing  Goal: Maintains/Returns to pre admission functional level  Description: INTERVENTIONS:  - Perform BMAT or MOVE assessment daily    - Set and communicate daily mobility goal to care team and patient/family/caregiver     - Collaborate with rehabilitation services on mobility goals if consulted  - Out of bed for toileting  - Record patient progress and toleration of activity level   Outcome: Progressing     Problem: Prexisting or High Potential for Compromised Skin Integrity  Goal: Skin integrity is maintained or improved  Description: INTERVENTIONS:  - Identify patients at risk for skin breakdown  - Assess and monitor skin integrity  - Assess and monitor nutrition and hydration status  - Monitor labs   - Assess for incontinence   - Turn and reposition patient  - Assist with mobility/ambulation  - Relieve pressure over bony prominences  - Avoid friction and shearing  - Provide appropriate hygiene as needed including keeping skin clean and dry  - Evaluate need for skin moisturizer/barrier cream  - Collaborate with interdisciplinary team   - Patient/family teaching  - Consider wound care consult   Outcome: Progressing     Problem: METABOLIC, FLUID AND ELECTROLYTES - ADULT  Goal: Fluid balance maintained  Description: INTERVENTIONS:  - Monitor labs   - Monitor I/O and WT  - Instruct patient on fluid and nutrition as appropriate  - Assess for signs & symptoms of volume excess or deficit  Outcome: Progressing

## 2022-05-24 NOTE — ASSESSMENT & PLAN NOTE
· Dyspnea x2 hours at facility with then increased work of breathing with BP of 198/104 at facility prompting ED evaluation   · Note - patient has been on continuous supplemental oxygen via nasal cannula over the last week due to dyspnea at facility and had been ordered morphine q 2 hours p r n  for dyspnea at facility  · Last echo 2017 showed "LVEF 55%  Possible mid to apical anteroseptal and inferior wall hypokinesis  Mild diastolic dysfunction "   · No home diuretics but documented hx of HF on facility papers    · CXR with pulmonary edema on my review   ·   · Placed on BiPAP initially and then weaned to 4L NC once there was decrease in work of breathing   · Given lasix 40mg IV - UOP at 450cc at 3 hours (suspect closer to 600 as pt had wet brief that was unable to be recorded)   · Cont diuresis as noted by cardiology  · Update echo - EF 45%, patient prefers only medical management  · Cardiology consult appreciated     · Strict I/O and daily weights   · Fluid and sodium restriction   · Transfer out of SD2

## 2022-05-25 LAB
ALBUMIN SERPL BCP-MCNC: 3.4 G/DL (ref 3.5–5)
ALP SERPL-CCNC: 110 U/L (ref 46–116)
ALT SERPL W P-5'-P-CCNC: 16 U/L (ref 12–78)
ANION GAP SERPL CALCULATED.3IONS-SCNC: 6 MMOL/L (ref 4–13)
AST SERPL W P-5'-P-CCNC: 42 U/L (ref 5–45)
BASOPHILS # BLD AUTO: 0.11 THOUSANDS/ΜL (ref 0–0.1)
BASOPHILS NFR BLD AUTO: 1 % (ref 0–1)
BILIRUB SERPL-MCNC: 0.5 MG/DL (ref 0.2–1)
BUN SERPL-MCNC: 28 MG/DL (ref 5–25)
CALCIUM ALBUM COR SERPL-MCNC: 9.6 MG/DL (ref 8.3–10.1)
CALCIUM SERPL-MCNC: 9.1 MG/DL (ref 8.3–10.1)
CHLORIDE SERPL-SCNC: 105 MMOL/L (ref 100–108)
CO2 SERPL-SCNC: 28 MMOL/L (ref 21–32)
CREAT SERPL-MCNC: 1.33 MG/DL (ref 0.6–1.3)
EOSINOPHIL # BLD AUTO: 0.62 THOUSAND/ΜL (ref 0–0.61)
EOSINOPHIL NFR BLD AUTO: 6 % (ref 0–6)
ERYTHROCYTE [DISTWIDTH] IN BLOOD BY AUTOMATED COUNT: 19.4 % (ref 11.6–15.1)
GFR SERPL CREATININE-BSD FRML MDRD: 53 ML/MIN/1.73SQ M
GLUCOSE SERPL-MCNC: 120 MG/DL (ref 65–140)
GLUCOSE SERPL-MCNC: 124 MG/DL (ref 65–140)
GLUCOSE SERPL-MCNC: 125 MG/DL (ref 65–140)
GLUCOSE SERPL-MCNC: 125 MG/DL (ref 65–140)
GLUCOSE SERPL-MCNC: 145 MG/DL (ref 65–140)
GLUCOSE SERPL-MCNC: 99 MG/DL (ref 65–140)
HCT VFR BLD AUTO: 41 % (ref 36.5–49.3)
HGB BLD-MCNC: 12.5 G/DL (ref 12–17)
IMM GRANULOCYTES # BLD AUTO: 0.04 THOUSAND/UL (ref 0–0.2)
IMM GRANULOCYTES NFR BLD AUTO: 0 % (ref 0–2)
LYMPHOCYTES # BLD AUTO: 1.58 THOUSANDS/ΜL (ref 0.6–4.47)
LYMPHOCYTES NFR BLD AUTO: 15 % (ref 14–44)
MCH RBC QN AUTO: 26.5 PG (ref 26.8–34.3)
MCHC RBC AUTO-ENTMCNC: 30.5 G/DL (ref 31.4–37.4)
MCV RBC AUTO: 87 FL (ref 82–98)
MONOCYTES # BLD AUTO: 0.66 THOUSAND/ΜL (ref 0.17–1.22)
MONOCYTES NFR BLD AUTO: 6 % (ref 4–12)
NEUTROPHILS # BLD AUTO: 7.63 THOUSANDS/ΜL (ref 1.85–7.62)
NEUTS SEG NFR BLD AUTO: 72 % (ref 43–75)
NRBC BLD AUTO-RTO: 0 /100 WBCS
PLATELET # BLD AUTO: 1157 THOUSANDS/UL (ref 149–390)
PMV BLD AUTO: 10.3 FL (ref 8.9–12.7)
POTASSIUM SERPL-SCNC: 3.8 MMOL/L (ref 3.5–5.3)
PROT SERPL-MCNC: 7.9 G/DL (ref 6.4–8.2)
RBC # BLD AUTO: 4.71 MILLION/UL (ref 3.88–5.62)
SODIUM SERPL-SCNC: 139 MMOL/L (ref 136–145)
WBC # BLD AUTO: 10.64 THOUSAND/UL (ref 4.31–10.16)

## 2022-05-25 PROCEDURE — 99232 SBSQ HOSP IP/OBS MODERATE 35: CPT | Performed by: HOSPITALIST

## 2022-05-25 PROCEDURE — 82948 REAGENT STRIP/BLOOD GLUCOSE: CPT

## 2022-05-25 PROCEDURE — 99232 SBSQ HOSP IP/OBS MODERATE 35: CPT | Performed by: INTERNAL MEDICINE

## 2022-05-25 PROCEDURE — 94760 N-INVAS EAR/PLS OXIMETRY 1: CPT

## 2022-05-25 PROCEDURE — 80053 COMPREHEN METABOLIC PANEL: CPT | Performed by: HOSPITALIST

## 2022-05-25 PROCEDURE — 85025 COMPLETE CBC W/AUTO DIFF WBC: CPT | Performed by: HOSPITALIST

## 2022-05-25 RX ORDER — HYDROXYUREA 500 MG/1
1000 CAPSULE ORAL EVERY 12 HOURS
Status: DISCONTINUED | OUTPATIENT
Start: 2022-05-25 | End: 2022-05-26 | Stop reason: HOSPADM

## 2022-05-25 RX ADMIN — ROPINIROLE HYDROCHLORIDE 0.5 MG: 0.25 TABLET, FILM COATED ORAL at 21:43

## 2022-05-25 RX ADMIN — INSULIN LISPRO 5 UNITS: 100 INJECTION, SOLUTION INTRAVENOUS; SUBCUTANEOUS at 11:19

## 2022-05-25 RX ADMIN — CLOPIDOGREL BISULFATE 75 MG: 75 TABLET ORAL at 08:02

## 2022-05-25 RX ADMIN — METOPROLOL SUCCINATE 25 MG: 25 TABLET, FILM COATED, EXTENDED RELEASE ORAL at 08:02

## 2022-05-25 RX ADMIN — CHOLESTYRAMINE 4 G: 4 POWDER, FOR SUSPENSION ORAL at 08:02

## 2022-05-25 RX ADMIN — GABAPENTIN 300 MG: 300 CAPSULE ORAL at 17:16

## 2022-05-25 RX ADMIN — INSULIN LISPRO 5 UNITS: 100 INJECTION, SOLUTION INTRAVENOUS; SUBCUTANEOUS at 16:26

## 2022-05-25 RX ADMIN — LORATADINE 10 MG: 10 TABLET ORAL at 08:02

## 2022-05-25 RX ADMIN — FUROSEMIDE 40 MG: 10 INJECTION, SOLUTION INTRAMUSCULAR; INTRAVENOUS at 07:58

## 2022-05-25 RX ADMIN — HYDROXYUREA 500 MG: 500 CAPSULE ORAL at 08:03

## 2022-05-25 RX ADMIN — POTASSIUM CHLORIDE 40 MEQ: 1500 TABLET, EXTENDED RELEASE ORAL at 08:02

## 2022-05-25 RX ADMIN — ASPIRIN 81 MG: 81 TABLET, COATED ORAL at 08:02

## 2022-05-25 RX ADMIN — GABAPENTIN 600 MG: 300 CAPSULE ORAL at 21:43

## 2022-05-25 RX ADMIN — HYDROXYUREA 1000 MG: 500 CAPSULE ORAL at 21:43

## 2022-05-25 RX ADMIN — INSULIN LISPRO 5 UNITS: 100 INJECTION, SOLUTION INTRAVENOUS; SUBCUTANEOUS at 07:57

## 2022-05-25 RX ADMIN — TAMSULOSIN HYDROCHLORIDE 0.4 MG: 0.4 CAPSULE ORAL at 16:27

## 2022-05-25 RX ADMIN — INSULIN GLARGINE 20 UNITS: 100 INJECTION, SOLUTION SUBCUTANEOUS at 21:43

## 2022-05-25 RX ADMIN — PANTOPRAZOLE SODIUM 40 MG: 40 TABLET, DELAYED RELEASE ORAL at 08:02

## 2022-05-25 RX ADMIN — ATORVASTATIN CALCIUM 40 MG: 40 TABLET, FILM COATED ORAL at 16:27

## 2022-05-25 RX ADMIN — CHOLESTYRAMINE 4 G: 4 POWDER, FOR SUSPENSION ORAL at 17:16

## 2022-05-25 RX ADMIN — GABAPENTIN 300 MG: 300 CAPSULE ORAL at 08:02

## 2022-05-25 NOTE — ASSESSMENT & PLAN NOTE
Lab Results   Component Value Date    HGBA1C 5 3 05/22/2022       Recent Labs     05/24/22  1557 05/24/22  2048 05/25/22  0739 05/25/22  1113   POCGLU 81 137 125 145*       Blood Sugar Average: Last 72 hrs:  (P) 198 [id number]   · Home regimen:  Lantus 35 units HS, Humalog 12 units b i d  meals, and Humalog 10 units HS  · Obtain updated hemoglobin A1c  · cont Lantus 20 units HS and Humalog 5 units t i d  with meals along with SSI

## 2022-05-25 NOTE — PROGRESS NOTES
[place]  Progress Note - Jaimee Schmitt 1950, 70 y o  male MRN: [id number]  Unit/Bed#: -01 SDU Encounter: [id number]  Primary Care Provider: Maria Elena Chahal MD   Date and time admitted to hospital: 5/22/2022  9:27 PM    SIRS (systemic inflammatory response syndrome) ([facility] [address] )  Assessment & Plan  · Sirs criteria met on admission:  Tachycardia, tachypnea, and leukocytosis   · Suspect that sirs is secondary to acute pulmonary edema resulting in acute respiratory failure   · No signs or symptoms of acute bacterial infection  · Monitor off antibiotics   · Trend CBC and fever    Thrombocytosis  Assessment & Plan  · Platelets at 9133 on admission   · Prior thrombocytosis noted in 2017 at 700   · Pt on ASA and plavix already   · Hematology consult appreciated - hydrea started - plt improving - dose increased 5/26  · No acute need for pheresis per their report    Type 2 diabetes mellitus, with long-term current use of insulin [facility])  Assessment & Plan  Lab Results   Component Value Date    HGBA1C 5 3 05/22/2022       Recent Labs     05/24/22  1557 05/24/22  2048 05/25/22  0739 05/25/22  1113   POCGLU 81 137 125 145*       Blood Sugar Average: Last 72 hrs:  (P) 364 [id number]   · Home regimen:  Lantus 35 units HS, Humalog 12 units b i d  meals, and Humalog 10 units HS  · Obtain updated hemoglobin A1c  · cont Lantus 20 units HS and Humalog 5 units t i d  with meals along with SSI    Elevated troponin  Assessment & Plan  · HS troponin: 448 < 663 <1090  · Suspect that this is secondary to non mi troponin elevation 2/2 acute heart failure  · Initially held on IV heparin, however once 4 hour returned at 1090 - heparin gtt initiated x 48h  · Cardiology following medical management only per patient/family wishes    Coronary artery disease involving native coronary artery  Assessment & Plan  · Noted on facility paperwork  · Echo in 2017 showing possible hypokinesis of anteroseptal and inferior walls  · Admission EKG with Q waves inferiorly     Acute respiratory failure (HCC)  Assessment & Plan  · Presented to the ED with significant work of breathing and rales on auscultation   · Pt placed on BiPAP and given lasix 40IV x1 with significant improvement in clinical appearance  · He was able to be weaned off of BiPAP and placed on 4L NC with SpO2 of 94% and decreased work of breathing  · Suspected etiology is acute pulmonary edema based on CXR findings and reported significant HTN at facility of 198/104   · Weaned to 4L which is baseline  HTN (hypertension)  Assessment & Plan  · No home anti-HTN on facility list  · Hypertensive at facility with symptom onset   · SBP ranging 120-150s inpatient   · Will hold on initiating anti-HTN to allow for IV diuresis     Multiple sclerosis (HCC)  Assessment & Plan  · Continue baclofen 10mg PRN and requip HS     Diarrhea  Assessment & Plan  · Currently being worked up by GI due to 4-6 episodes of loose stool daily   · SIBO testing negative     * Acute pulmonary edema (HCC)  Assessment & Plan  · Dyspnea x2 hours at facility with then increased work of breathing with BP of 198/104 at facility prompting ED evaluation   · Note - patient has been on continuous supplemental oxygen via nasal cannula over the last week due to dyspnea at facility and had been ordered morphine q 2 hours p r n  for dyspnea at facility  · Last echo 2017 showed "LVEF 55%  Possible mid to apical anteroseptal and inferior wall hypokinesis  Mild diastolic dysfunction "   · No home diuretics but documented hx of HF on facility papers    · CXR with pulmonary edema on my review   ·   · Placed on BiPAP initially and then weaned to 4L NC once there was decrease in work of breathing   · Given lasix 40mg IV - UOP at 450cc at 3 hours (suspect closer to 600 as pt had wet brief that was unable to be recorded)   · Cont diuresis as noted by cardiology   Plan oral diuretics 5/26  · Update echo - EF 45%, patient prefers only medical management  · Cardiology consult appreciated  · Strict I/O and daily weights   · Fluid and sodium restriction   · Transfer out of SD2         VTE  Prophylaxis:   Pharmacologic: in place    Patient Centered Rounds: I have performed bedside rounds with nursing staff today  Discussions with Specialists or Other Care Team Provider: case management    Education and Discussions with Family / Patient: wife telephone      Current Length of Stay: 3 day(s)    Current Patient Status: Inpatient        Code Status: Level 3 - DNAR and DNI      Subjective:   Denies chest pain  Denies sob    Patient is seen and examined at bedside  All other ROS are negative  Objective:     Vitals:   Temp (24hrs), Av 2 °F (36 8 °C), Min:97 7 °F (36 5 °C), Max:98 7 °F (37 1 °C)    Temp:  [97 7 °F (36 5 °C)-98 7 °F (37 1 °C)] 98 3 °F (36 8 °C)  HR:  [77-86] 81  Resp:  [16-25] 20  BP: (113-134)/(54-85) 113/59  SpO2:  [93 %-99 %] 97 %  Body mass index is 29 89 kg/m²  Input and Output Summary (last 24 hours): Intake/Output Summary (Last 24 hours) at 2022 1254  Last data filed at 2022 1100  Gross per 24 hour   Intake 822 43 ml   Output 2250 ml   Net -1427 57 ml       Physical Exam:       GEN: No acute distress, comfortable on o2  HEEENT: No JVD, PERRLA, no scleral icterus  RESP: Lungs clear to auscultation bilaterally  CV: RRR, +s1/s2   ABD: SOFT NON TENDER, POSITIVE BOWEL SOUNDS, NO DISTENTION  PSYCH: CALM  Apparent dementia    NEURO:  NO FOCAL DEFICITS  SKIN: NO RASH  EXTREM: NO EDEMA    Additional Data:     Labs:    Results from last 7 days   Lab Units 22  0449   WBC Thousand/uL 10 64*   HEMOGLOBIN g/dL 12 5   HEMATOCRIT % 41 0   PLATELETS Thousands/uL 1,157*   NEUTROS PCT % 72   LYMPHS PCT % 15   MONOS PCT % 6   EOS PCT % 6     Results from last 7 days   Lab Units 22  0449   SODIUM mmol/L 139   POTASSIUM mmol/L 3 8   CHLORIDE mmol/L 105   CO2 mmol/L 28   BUN mg/dL 28* CREATININE mg/dL 1 33*   ANION GAP mmol/L 6   CALCIUM mg/dL 9 1   ALBUMIN g/dL 3 4*   TOTAL BILIRUBIN mg/dL 0 50   ALK PHOS U/L 110   ALT U/L 16   AST U/L 42   GLUCOSE RANDOM mg/dL 99     Results from last 7 days   Lab Units 05/22/22  2144   INR  1 13     Results from last 7 days   Lab Units 05/25/22  1113 05/25/22  0739 05/24/22  2048 05/24/22  1557 05/24/22  1112 05/24/22  0719 05/24/22  2661 05/24/22  0555 05/24/22  0234 05/24/22  0204 05/23/22  2153 05/23/22  1539   POC GLUCOSE mg/dl 145* 125 137 81 108 129 104 65 104 73 88 109     Results from last 7 days   Lab Units 05/22/22  2231   HEMOGLOBIN A1C % 5 3               * I Have Reviewed All Lab Data Listed Above  Imaging:     Results for orders placed during the hospital encounter of 05/22/22    X-ray chest 1 view portable    Narrative  CHEST    INDICATION:   CHF  COMPARISON:  08/03/2009    EXAM PERFORMED/VIEWS:  XR CHEST PORTABLE  1 image    FINDINGS:    Cardiomediastinal silhouette appears unremarkable  The patient appears to be in congestive heart failure  The lungs are clear  No pneumothorax or pleural effusion  Osseous structures appear within normal limits for patient age  Impression  Congestive heart failure  Workstation performed: [id number]    No results found for this or any previous visit        *I have reviewed all imaging reports listed above      Recent Cultures (last 7 days):           Last 24 Hours Medication List:   Current Facility-Administered Medications   Medication Dose Route Frequency Provider Last Rate    aspirin  81 mg Oral Daily Lynita Cabot, PA-C      atorvastatin  40 mg Oral Daily With CATARINO Hunter      baclofen  10 mg Oral Q6H PRN Lynita Cabot, PA-C      cholestyramine sugar free  4 g Oral BID Lynita Cabot, PA-C      clopidogrel  75 mg Oral Daily Lynita Cabot, PA-C      gabapentin  300 mg Oral BID Lynita Cabot, PA-C      gabapentin  600 mg Oral HS Mita L Earnestine Hanson PA-C      hydroxyurea  1,000 mg Oral Q12H Rajani Carter,       insulin glargine  20 Units Subcutaneous HS Figueroa High PA-C      insulin lispro  1-5 Units Subcutaneous HS Figueroa High PA-C      insulin lispro  1-6 Units Subcutaneous TID AC Figueroa High PA-C      insulin lispro  5 Units Subcutaneous TID With Meals Figueroa High PA-C      loratadine  10 mg Oral Daily Figueroa High PA-C      metoprolol succinate  25 mg Oral Daily CATARINO Lord      pantoprazole  40 mg Oral Daily Before Breakfast Figueroa High PA-C      potassium chloride  40 mEq Oral Daily CATARINO Lord      rOPINIRole  0 5 mg Oral HS Figueroa High PA-C      tamsulosin  0 4 mg [facility] Daily [facility]          Today, Patient Was Seen By: Kavitha Wilson MD    ** Please Note: Dictation voice to text software may have been used in the creation of this document   **

## 2022-05-25 NOTE — ASSESSMENT & PLAN NOTE
· Platelets at 2703 on admission   · Prior thrombocytosis noted in 2017 at 700   · Pt on ASA and plavix already   · Hematology consult appreciated - hydrea started - plt improving - dose increased 5/26  · No acute need for pheresis per their report

## 2022-05-25 NOTE — PROGRESS NOTES
General Cardiology   Progress Note -  Team One   Marielena Lewis 70 y o  male MRN: [id number]    Unit/Bed#: -01 SDU Encounter: [id number]    Assessment:    Acute hypoxic respiratory failure  · To the ED from [facility] with c/o B/L arm pain and dyspnea that worsened when being moved to bathroom by staff  · Of note; patient has been on 4 L NC at his nursing facility and has been receiving morphine for "dyspnea" for at least the past 2 weeks; unclear if the etiology behind this has been worked up; may have been worsening pulm edema related to new cardiomyopathy  · requiring BIPAP on admit due to WOB; now down to 4 L NC which was his baseline PTA  · Chest xray with evidence of pulm edema by my review     Acute systolic/diastolic HF  · Presentation and chest xray as above; BP at nursing facility reported as 198/104 PTA  · proBNP 389  · IV lasix 40 mg BID ordered on admit and continued yesterday due to evidence of continued bibasilar rales  · This AM his creat remains slightly elevated from admit at 1 3 which is stable from yesterday but his BUN has now increased to 28 today from 23; he continues with scant bibasilar rales but has no c/o SOB and is back to his baseline 4 L NC; he did receive IV lasix 40 mg this AM prior to my exam; will hold further IV diuretics today and likely start PO tomorrow  · I/O: total UO in 24 hrs is 2 8 L; net negative 1 7 L in 24 hrs  · May have been flash pulm edema due to accelerated HTN but given evidence of reduced EF and dyspnea for at least the past 2 weeks, likely this represents CHF  · TTE from 5/23: EF 45%; akinesis of the apical anterior wall and apex; hypokinesis of the mid anterior wall; grade 2 DD; mild TR  · Daily weights; strict I/O's; 2 g Na restricted diet with fluid restriction     Elevated troponin with B/L arm pain  · Presentation as above; has had BARCENAS x 2 weeks but noted B/L UE "tingling" in his triceps with exertion; no c/o CP  · EKG on admit sinus tachycardia with non-specific ST/T wave changes  · hsTn 448->663->1,090->1,439  · IV heparin drip started by SLIM per [facility] protocol  · It is possible that his troponin are elevated due to accelerated HTN and flash pulm edema on admit BUT given his risk factors for CAD (age/fam hx of CAD/HTN/DM2 ) in conjunction with recent BARCENAS and B/L tricep "tingling" with exertion PTA, there could be concern for NSTEMI; to err on the side of caution, we will continue IV heparin drip for 48 hrs (stopped at 0200 this AM) and treat patient medically for presumed CAD; patient does not wish to pursue cardiac cath and would rather medical management   · Continue aspirin 81 mg QD; Toprol XL 25 mg QD and Lipitor 40 mg QD started during consult, continue outpatient dose of Plavix 75 mg QD    Cardiomyopathy  · TTE as above; EF 45% (had been 55% in 2017); etiology likely ischemic given wall motion abnormalities  · Patient wishes to pursue medical management and does not want to pursue cardiac catheterization  · GDMT: Toprol XL 25 mg QD; will add ACE/ARB by discharge once his creat normalizes     Essential HTN  · SBP averaging 110's-120's; HTN listed in [address]  · Not on any anti-hypertensives as outpatient  · Toprol XL 25 mg QD started due to probable underlying CAD  · Will attempt to add ACE/ARB by discharge once his creat normalizes     CAD  · Noted on paperwork from facility; no prior documentation  · GDMT: aspirin 81 mg QD, Plavix 75 mg QD;  Toprol XL 25 mg QD and Lipitor 40 mg QD started day of consult     DM2  · HgbA1c 5 3  · Management per primary team     thrombocytosis  · PLT 1156 on admit; prior hx of thrombocytosis noted 2017  · On ASA/Plavix as outpatient  · Hematology consulted; placed on hydroxyurea     Multiple sclerosis       Plan/Recommendations:  · IV heparin drip d/c'd this AM  · Continue aspirin 81 mg QD, Toprol XL 25 mg QD, Lipitor 40 mg QD and Plavix 75mg QD  · Will add ACE/ARB by discharge for GDMT for CM once his creat normalizes  · This AM his creat remains slightly elevated from admit at 1 3 which is stable from yesterday but his BUN has now increased to 28 today from 23; he continues with scant bibasilar rales but has no c/o SOB and is back to his baseline 4 L NC; he did receive IV lasix 40 mg this AM prior to my exam; will hold further IV diuretics today and likely start PO tomorrow      _________________________________________________________________    Subjective  Patient notes cough this morning but denies any shortness of breath at rest   No further episodes of bilateral arm discomfort since admission  Review of Systems   Constitutional: Negative for chills, fever and malaise/fatigue  HENT: Negative for congestion  Cardiovascular: Negative for chest pain, dyspnea on exertion, leg swelling, orthopnea and palpitations  Respiratory: Positive for cough and sputum production  Negative for shortness of breath (no SOB at rest) and wheezing  Endocrine: Negative  Hematologic/Lymphatic: Negative  Skin: Negative  Musculoskeletal: Negative  Gastrointestinal: Negative for bloating, abdominal pain, nausea and vomiting  Genitourinary: Negative  Neurological: Negative for dizziness and light-headedness  Psychiatric/Behavioral: Negative  All other systems reviewed and are negative  Objective:   Vitals: Blood pressure 126/85, pulse 86, temperature 97 8 °F (36 6 °C), temperature source Oral, resp  rate 21, height 5' 10" (1 778 m), weight 94 5 kg (208 lb 5 4 oz), SpO2 99 %  ,     Wt Readings from Last 3 Encounters:   05/25/22 94 5 kg (208 lb 5 4 oz)   02/25/22 94 4 kg (208 lb 3 2 oz)   11/29/13 93 4 kg (206 lb)        Lab Results   Component Value Date    CREATININE 1 33 (H) 05/25/2022    CREATININE 1 35 (H) 05/24/2022    CREATININE 1 29 05/23/2022         Body mass index is 29 89 kg/m²  ,     Systolic (69HKT), TFE:557 , Min:113 , NKZ:464     Diastolic (72CTV), TERESSA:95, Min:54, Max:85          Intake/Output Summary (Last 24 hours) at 5/25/2022 0807  Last data filed at 5/25/2022 0525  Gross per 24 hour   Intake 1062 43 ml   Output 2850 ml   Net -1787 57 ml     Weight (last 2 days)     Date/Time Weight    05/25/22 0536 94 5 (208 34)    05/25/22 0440 94 5 (208 34)    05/24/22 0600 90 1 (198 63)    05/23/22 0830 97 1 (214)    05/23/22 0533 97 2 (214 29)    05/23/22 0041 97 9 (215 83)            Telemetry Review: sinus rhythm with rates in the 80's        Physical Exam  Vitals reviewed  Constitutional:       General: He is not in acute distress  HENT:      Head: Normocephalic and atraumatic  Mouth/Throat:      Mouth: Mucous membranes are moist    Cardiovascular:      Rate and Rhythm: Normal rate and regular rhythm  Heart sounds: Normal heart sounds, S1 normal and S2 normal  No murmur heard  Pulmonary:      Effort: Pulmonary effort is normal  No respiratory distress  Comments: Scant crackles B/L lower lobes  Abdominal:      General: Bowel sounds are normal  There is no distension  Palpations: Abdomen is soft  Musculoskeletal:         General: Normal range of motion  Cervical back: Normal range of motion and neck supple  Right lower leg: No edema  Left lower leg: No edema  Skin:     General: Skin is warm and dry  Neurological:      Mental Status: He is alert and oriented to person, place, and time     Psychiatric:         Mood and Affect: Mood normal          LABORATORY RESULTS      CBC with diff:   Results from last 7 days   Lab Units 05/25/22  0449 05/24/22  0849 05/23/22  0241 05/22/22  2231 05/22/22  2150   WBC Thousand/uL 10 64* 11 59* 14 40* 14 28*  --    HEMOGLOBIN g/dL 12 5 11 8* 12 9 12 6  --    I STAT HEMOGLOBIN g/dl  --   --   --   --  13 9   HEMATOCRIT % 41 0 38 7 41 8 41 5  --    HEMATOCRIT, ISTAT %  --   --   --   --  41   MCV fL 87 87 87 87  --    PLATELETS Thousands/uL 1,157* 1,154* 1,251* 1,156*  --    MCH pg 26 5* 26 5* 27 0 26 5*  -- MCHC g/dL 30 5* 30 5* 30 9* 30 4*  --    RDW % 19 4* 19 7* 19 8* 19 7*  --    MPV fL 10 3 10 6 10 6 10 8  --    NRBC AUTO /100 WBCs 0 0  --  0  --        CMP:  Results from last 7 days   Lab Units 05/25/22 0449 05/24/22  0159 05/23/22  0241 05/22/22 2150 05/22/22  2144   POTASSIUM mmol/L 3 8 3 2* 3 7  --  3 6   CHLORIDE mmol/L 105 105 104  --  107   CO2 mmol/L 28 30 30  --  24   CO2, I-STAT mmol/L  --   --   --  24  --    BUN mg/dL 28* 23 21  --  21   CREATININE mg/dL 1 33* 1 35* 1 29  --  1 18   GLUCOSE, ISTAT mg/dl  --   --   --  249*  --    CALCIUM mg/dL 9 1 8 6 8 6  --  8 4   AST U/L 42  --  31  --  28   ALT U/L 16  --  17  --  12   ALK PHOS U/L 110  --  114  --  121*   EGFR ml/min/1 73sq m 53 52 55  --  61       BMP:  Results from last 7 days   Lab Units 05/25/22 0449 05/24/22 0159 05/23/22 0241 05/22/22 2150 05/22/22  2144   POTASSIUM mmol/L 3 8 3 2* 3 7  --  3 6   CHLORIDE mmol/L 105 105 104  --  107   CO2 mmol/L 28 30 30  --  24   CO2, I-STAT mmol/L  --   --   --  24  --    BUN mg/dL 28* 23 21  --  21   CREATININE mg/dL 1 33* 1 35* 1 29  --  1 18   GLUCOSE, ISTAT mg/dl  --   --   --  249*  --    CALCIUM mg/dL 9 1 8 6 8 6  --  8 4       Lab Results   Component Value Date    NTBNP 389 (H) 05/22/2022             Results from last 7 days   Lab Units 05/23/22  0241   MAGNESIUM mg/dL 2 3          Results from last 7 days   Lab Units 05/22/22  2231   HEMOGLOBIN A1C % 5 3              Results from last 7 days   Lab Units 05/22/22  2144   INR  1 13       Lipid Profile:   No results found for: CHOL  Lab Results   Component Value Date    HDL 29 (L) 05/24/2022     Lab Results   Component Value Date    LDLCALC 124 (H) 05/24/2022     Lab Results   Component Value Date    TRIG 122 05/24/2022       Cardiac testing:   No results found for this or any previous visit  No results found for this or any previous visit  No results found for this or any previous visit  No valid procedures specified    No results found for this or any previous visit          Meds/Allergies   current meds:   Current Facility-Administered Medications   Medication Dose Route Frequency    aspirin (ECOTRIN LOW STRENGTH) EC tablet 81 mg  81 mg Oral Daily    atorvastatin (LIPITOR) tablet 40 mg  40 mg Oral Daily With Dinner    baclofen tablet 10 mg  10 mg Oral Q6H PRN    cholestyramine sugar free (QUESTRAN LIGHT) packet 4 g  4 g Oral BID    clopidogrel (PLAVIX) tablet 75 mg  75 mg Oral Daily    furosemide (LASIX) injection 40 mg  40 mg Intravenous BID (diuretic)    gabapentin (NEURONTIN) capsule 300 mg  300 mg Oral BID    gabapentin (NEURONTIN) capsule 600 mg  600 mg Oral HS    heparin (porcine) injection 2,000 Units  2,000 Units Intravenous Q1H PRN    heparin (porcine) injection 4,000 Units  4,000 Units Intravenous Q1H PRN    hydroxyurea (HYDREA) capsule 500 mg  500 mg Oral TID    insulin glargine (LANTUS) subcutaneous injection 20 Units 0 2 mL  20 Units Subcutaneous HS    insulin lispro (HumaLOG) 100 units/mL subcutaneous injection 1-5 Units  1-5 Units Subcutaneous HS    insulin lispro (HumaLOG) 100 units/mL subcutaneous injection 1-6 Units  1-6 Units Subcutaneous TID AC    insulin lispro (HumaLOG) 100 units/mL subcutaneous injection 5 Units  5 Units Subcutaneous TID With Meals    loratadine (CLARITIN) tablet 10 mg  10 mg Oral Daily    metoprolol succinate (TOPROL-XL) 24 hr tablet 25 mg  25 mg Oral Daily    pantoprazole (PROTONIX) EC tablet 40 mg  40 mg Oral Daily Before Breakfast    potassium chloride (K-DUR,KLOR-CON) CR tablet 40 mEq  40 mEq Oral Daily    rOPINIRole (REQUIP) tablet 0 5 mg  0 5 mg Oral HS    tamsulosin (FLOMAX) capsule 0 4 mg  0 4 mg Oral Daily With Dinner     Medications Prior to Admission   Medication    acetaminophen (TYLENOL) 325 mg tablet    aspirin (ECOTRIN LOW STRENGTH) 81 mg EC tablet    baclofen 10 mg tablet    Cholecalciferol (Vitamin D3) 1 25 MG (04491 UT) CAPS    cholestyramine sugar free (QUESTRAN LIGHT) 4 g packet    clopidogrel (PLAVIX) 75 mg tablet    gabapentin (NEURONTIN) 300 mg capsule    gabapentin (NEURONTIN) 600 MG tablet    insulin glargine (LANTUS) 100 units/mL subcutaneous injection    insulin lispro (HumaLOG) 100 units/mL injection    insulin lispro (insulin lispro) 100 units/mL injection    loperamide (IMODIUM) 2 mg capsule    loratadine (CLARITIN) 10 mg tablet    morphine 20 MG/5ML solution    pantoprazole (PROTONIX) 40 mg tablet    polyethylene glycol-propylene glycol (Systane) 0 4-0 3 %    rOPINIRole (REQUIP) 0 5 mg tablet    tamsulosin (FLOMAX) 0 4 mg    bisacodyl (Dulcolax) 10 mg suppository    EMOLLIENT CREAM & WOUND GEL EX    Glucagon, rDNA, (Glucagon Emergency) 1 MG KIT    magnesium hydroxide (MILK OF MAGNESIA) 400 mg/5 mL oral suspension    polyethylene glycol (GOLYTELY) 4000 mL solution    Probiotic Product (PROBIOTIC PO)    sodium phosphate-biphosphate (FLEET) 7-19 g 118 mL enema    white petrolatum-corn starch-lanolin (TRIPLE PASTE) 12 8 % ointment            Counseling / Coordination of Care  Total floor / unit time spent today 20 minutes  Greater than 50% of total time was spent with the patient and / or family counseling and / or coordination of care  ** Please Note: Dragon 360 Dictation voice to text software may have been used in the creation of this document   **

## 2022-05-25 NOTE — ASSESSMENT & PLAN NOTE
· Dyspnea x2 hours at facility with then increased work of breathing with BP of 198/104 at facility prompting ED evaluation   · Note - patient has been on continuous supplemental oxygen via nasal cannula over the last week due to dyspnea at facility and had been ordered morphine q 2 hours p r n  for dyspnea at facility  · Last echo 2017 showed "LVEF 55%  Possible mid to apical anteroseptal and inferior wall hypokinesis  Mild diastolic dysfunction "   · No home diuretics but documented hx of HF on facility papers    · CXR with pulmonary edema on my review   ·   · Placed on BiPAP initially and then weaned to 4L NC once there was decrease in work of breathing   · Given lasix 40mg IV - UOP at 450cc at 3 hours (suspect closer to 600 as pt had wet brief that was unable to be recorded)   · Cont diuresis as noted by cardiology  Plan oral diuretics 5/26  · Update echo - EF 45%, patient prefers only medical management  · Cardiology consult appreciated     · Strict I/O and daily weights   · Fluid and sodium restriction   · Transfer out of SD2

## 2022-05-25 NOTE — PROGRESS NOTES
Oncology Progress Note  Danie Gallegos 70 y o  male MRN: [id number]  Unit/Bed#: -01 SDU Encounter: [id number]        Subjective:patient sleeping comfortable  Chest pain is resolved  No complaints of headache or new pain  plts down to 1,157,000  He denies any nausea from the hydrea  Objective:      /85 (BP Location: Left arm)   Pulse 86   Temp 97 8 °F (36 6 °C) (Oral)   Resp 21   Ht 5' 10" (1 778 m)   Wt 94 5 kg (208 lb 5 4 oz)   SpO2 99%   BMI 29 89 kg/m²   General Appearance:    Alert, oriented        Eyes:    PERRL   Ears:    Normal external ear canals, both ears   Nose:   Nares normal, septum midline   Throat:   Mucosa moist  Pharynx without injection  Neck:   Supple       Lungs:     Clear to auscultation bilaterally   Chest Wall:    No tenderness or deformity    Heart:    Regular rate and rhythm       Abdomen:     Soft, non-tender, bowel sounds +, no organomegaly           Extremities:   Extremities no cyanosis or edema       Skin:   no rash or icterus      Lymph nodes:   Cervical, supraclavicular, and axillary nodes normal   Neurologic: Left sided weakness        Recent Results (from the past 48 hour(s))   Fingerstick Glucose (POCT)    Collection Time: 05/23/22 11:02 AM   Result Value Ref Range    POC Glucose 149 (H) 65 - 140 mg/dl   Fingerstick Glucose (POCT)    Collection Time: 05/23/22  3:39 PM   Result Value Ref Range    POC Glucose 109 65 - 140 mg/dl   APTT    Collection Time: 05/23/22  5:09 PM   Result Value Ref Range    PTT 50 (H) 23 - 37 seconds   Fingerstick Glucose (POCT)    Collection Time: 05/23/22  9:53 PM   Result Value Ref Range    POC Glucose 88 65 - 140 mg/dl   APTT    Collection Time: 05/24/22  1:58 AM   Result Value Ref Range     (H) 23 - 37 seconds   Lipid Panel with Direct LDL reflex    Collection Time: 05/24/22  1:59 AM   Result Value Ref Range    Cholesterol 177 See Comment mg/dL    Triglycerides 122 See Comment mg/dL    HDL, Direct 29 (L) >=40 mg/dL    LDL Calculated 124 (H) 0 - 100 mg/dL   Basic metabolic panel    Collection Time: 05/24/22  1:59 AM   Result Value Ref Range    Sodium 142 136 - 145 mmol/L    Potassium 3 2 (L) 3 5 - 5 3 mmol/L    Chloride 105 100 - 108 mmol/L    CO2 30 21 - 32 mmol/L    ANION GAP 7 4 - 13 mmol/L    BUN 23 5 - 25 mg/dL    Creatinine 1 35 (H) 0 60 - 1 30 mg/dL    Glucose 73 65 - 140 mg/dL    Calcium 8 6 8 3 - 10 1 mg/dL    eGFR 52 ml/min/1 73sq m   Fingerstick Glucose (POCT)    Collection Time: 05/24/22  2:04 AM   Result Value Ref Range    POC Glucose 73 65 - 140 mg/dl   Fingerstick Glucose (POCT)    Collection Time: 05/24/22  2:34 AM   Result Value Ref Range    POC Glucose 104 65 - 140 mg/dl   Fingerstick Glucose (POCT)    Collection Time: 05/24/22  5:55 AM   Result Value Ref Range    POC Glucose 65 65 - 140 mg/dl   Fingerstick Glucose (POCT)    Collection Time: 05/24/22  6:14 AM   Result Value Ref Range    POC Glucose 104 65 - 140 mg/dl   Fingerstick Glucose (POCT)    Collection Time: 05/24/22  7:19 AM   Result Value Ref Range    POC Glucose 129 65 - 140 mg/dl   APTT    Collection Time: 05/24/22  8:49 AM   Result Value Ref Range    PTT 70 (H) 23 - 37 seconds   CBC and differential    Collection Time: 05/24/22  8:49 AM   Result Value Ref Range    WBC 11 59 (H) 4 31 - 10 16 Thousand/uL    RBC 4 45 3 88 - 5 62 Million/uL    Hemoglobin 11 8 (L) 12 0 - 17 0 g/dL    Hematocrit 38 7 36 5 - 49 3 %    MCV 87 82 - 98 fL    MCH 26 5 (L) 26 8 - 34 3 pg    MCHC 30 5 (L) 31 4 - 37 4 g/dL    RDW 19 7 (H) 11 6 - 15 1 %    MPV 10 6 8 9 - 12 7 fL    Platelets 2,063 (HH) 149 - 390 Thousands/uL    nRBC 0 /100 WBCs    Neutrophils Relative 65 43 - 75 %    Immat GRANS % 0 0 - 2 %    Lymphocytes Relative 19 14 - 44 %    Monocytes Relative 8 4 - 12 %    Eosinophils Relative 7 (H) 0 - 6 %    Basophils Relative 1 0 - 1 %    Neutrophils Absolute 7 56 1 85 - 7 62 Thousands/µL    Immature Grans Absolute 0 05 0 00 - 0 20 Thousand/uL    Lymphocytes Absolute 2 14 0 60 - 4 47 Thousands/µL    Monocytes Absolute 0 91 0 17 - 1 22 Thousand/µL    Eosinophils Absolute 0 80 (H) 0 00 - 0 61 Thousand/µL    Basophils Absolute 0 13 (H) 0 00 - 0 10 Thousands/µL   Fingerstick Glucose (POCT)    Collection Time: 05/24/22 11:12 AM   Result Value Ref Range    POC Glucose 108 65 - 140 mg/dl   APTT    Collection Time: 05/24/22  3:55 PM   Result Value Ref Range    PTT 69 (H) 23 - 37 seconds   Fingerstick Glucose (POCT)    Collection Time: 05/24/22  3:57 PM   Result Value Ref Range    POC Glucose 81 65 - 140 mg/dl   Fingerstick Glucose (POCT)    Collection Time: 05/24/22  8:48 PM   Result Value Ref Range    POC Glucose 137 65 - 140 mg/dl   CBC and differential    Collection Time: 05/25/22  4:49 AM   Result Value Ref Range    WBC 10 64 (H) 4 31 - 10 16 Thousand/uL    RBC 4 71 3 88 - 5 62 Million/uL    Hemoglobin 12 5 12 0 - 17 0 g/dL    Hematocrit 41 0 36 5 - 49 3 %    MCV 87 82 - 98 fL    MCH 26 5 (L) 26 8 - 34 3 pg    MCHC 30 5 (L) 31 4 - 37 4 g/dL    RDW 19 4 (H) 11 6 - 15 1 %    MPV 10 3 8 9 - 12 7 fL    Platelets 7,285 (HH) 149 - 390 Thousands/uL    nRBC 0 /100 WBCs    Neutrophils Relative 72 43 - 75 %    Immat GRANS % 0 0 - 2 %    Lymphocytes Relative 15 14 - 44 %    Monocytes Relative 6 4 - 12 %    Eosinophils Relative 6 0 - 6 %    Basophils Relative 1 0 - 1 %    Neutrophils Absolute 7 63 (H) 1 85 - 7 62 Thousands/µL    Immature Grans Absolute 0 04 0 00 - 0 20 Thousand/uL    Lymphocytes Absolute 1 58 0 60 - 4 47 Thousands/µL    Monocytes Absolute 0 66 0 17 - 1 22 Thousand/µL    Eosinophils Absolute 0 62 (H) 0 00 - 0 61 Thousand/µL    Basophils Absolute 0 11 (H) 0 00 - 0 10 Thousands/µL   Comprehensive metabolic panel    Collection Time: 05/25/22  4:49 AM   Result Value Ref Range    Sodium 139 136 - 145 mmol/L    Potassium 3 8 3 5 - 5 3 mmol/L    Chloride 105 100 - 108 mmol/L    CO2 28 21 - 32 mmol/L    ANION GAP 6 4 - 13 mmol/L    BUN 28 (H) 5 - 25 mg/dL    Creatinine 1 33 (H) 0 60 - 1 30 mg/dL Glucose 99 65 - 140 mg/dL    Calcium 9 1 8 3 - 10 1 mg/dL    Corrected Calcium 9 6 8 3 - 10 1 mg/dL    AST 42 5 - 45 U/L    ALT 16 12 - 78 U/L    Alkaline Phosphatase 110 46 - 116 U/L    Total Protein 7 9 6 4 - 8 2 g/dL    Albumin 3 4 (L) 3 5 - 5 0 g/dL    Total Bilirubin 0 50 0 20 - 1 00 mg/dL    eGFR 53 ml/min/1 73sq m   Fingerstick Glucose (POCT)    Collection Time: 05/25/22  7:39 AM   Result Value Ref Range    POC Glucose 125 65 - 140 mg/dl         X-ray chest 1 view portable    Result Date: 5/23/2022  Narrative: CHEST INDICATION:   CHF  COMPARISON:  08/03/2009 EXAM PERFORMED/VIEWS:  XR CHEST PORTABLE 1 image FINDINGS: Cardiomediastinal silhouette appears unremarkable  The patient appears to be in congestive heart failure  The lungs are clear  No pneumothorax or pleural effusion  Osseous structures appear within normal limits for patient age  Impression: Congestive heart failure  Workstation performed: [id number]     Echo complete    Result Date: 5/23/2022  Narrative: Alf Cast  Left Ventricle: Left ventricular cavity size is normal  Wall thickness is normal  The left ventricular ejection fraction is 45%  Systolic function is mildly reduced  Diastolic function is moderately abnormal, consistent with grade II (pseudonormal) relaxation    The following segments are akinetic: apical anterior and apex    The following segments are hypokinetic: mid anterior    All other segments are normal    Tricuspid Valve: There is mild regurgitation  Assessment and Plan : 69 yo male with thrombocytosis  plt count is slowing coming down  I am going to increase hydrea dose to 1000mg BID  I am trying to avoid platelet pheresis and the patient would like to take a conservative approach to his care  Still awaiting jak2 panel  Will continue to follow with you  I spent 20 minutes in chart review, face to face counseling, coordination of care and documentation

## 2022-05-26 ENCOUNTER — TELEPHONE (OUTPATIENT)
Dept: HEMATOLOGY ONCOLOGY | Facility: CLINIC | Age: 72
End: 2022-05-26

## 2022-05-26 VITALS
WEIGHT: 196.87 LBS | RESPIRATION RATE: 18 BRPM | BODY MASS INDEX: 28.18 KG/M2 | HEART RATE: 89 BPM | HEIGHT: 70 IN | TEMPERATURE: 98.4 F | DIASTOLIC BLOOD PRESSURE: 69 MMHG | OXYGEN SATURATION: 93 % | SYSTOLIC BLOOD PRESSURE: 148 MMHG

## 2022-05-26 LAB
ALBUMIN SERPL BCP-MCNC: 3.4 G/DL (ref 3.5–5)
ALP SERPL-CCNC: 110 U/L (ref 46–116)
ALT SERPL W P-5'-P-CCNC: 18 U/L (ref 12–78)
ANION GAP SERPL CALCULATED.3IONS-SCNC: 8 MMOL/L (ref 4–13)
AST SERPL W P-5'-P-CCNC: 36 U/L (ref 5–45)
BASOPHILS # BLD AUTO: 0.12 THOUSANDS/ΜL (ref 0–0.1)
BASOPHILS NFR BLD AUTO: 1 % (ref 0–1)
BILIRUB SERPL-MCNC: 0.4 MG/DL (ref 0.2–1)
BUN SERPL-MCNC: 32 MG/DL (ref 5–25)
CALCIUM ALBUM COR SERPL-MCNC: 9.8 MG/DL (ref 8.3–10.1)
CALCIUM SERPL-MCNC: 9.3 MG/DL (ref 8.3–10.1)
CHLORIDE SERPL-SCNC: 105 MMOL/L (ref 100–108)
CO2 SERPL-SCNC: 27 MMOL/L (ref 21–32)
CREAT SERPL-MCNC: 1.37 MG/DL (ref 0.6–1.3)
EOSINOPHIL # BLD AUTO: 0.78 THOUSAND/ΜL (ref 0–0.61)
EOSINOPHIL NFR BLD AUTO: 7 % (ref 0–6)
ERYTHROCYTE [DISTWIDTH] IN BLOOD BY AUTOMATED COUNT: 19.2 % (ref 11.6–15.1)
FLUAV RNA RESP QL NAA+PROBE: NEGATIVE
FLUBV RNA RESP QL NAA+PROBE: NEGATIVE
GFR SERPL CREATININE-BSD FRML MDRD: 51 ML/MIN/1.73SQ M
GLUCOSE SERPL-MCNC: 105 MG/DL (ref 65–140)
GLUCOSE SERPL-MCNC: 150 MG/DL (ref 65–140)
GLUCOSE SERPL-MCNC: 97 MG/DL (ref 65–140)
HCT VFR BLD AUTO: 39.6 % (ref 36.5–49.3)
HGB BLD-MCNC: 12.2 G/DL (ref 12–17)
IMM GRANULOCYTES # BLD AUTO: 0.04 THOUSAND/UL (ref 0–0.2)
IMM GRANULOCYTES NFR BLD AUTO: 0 % (ref 0–2)
LYMPHOCYTES # BLD AUTO: 1.6 THOUSANDS/ΜL (ref 0.6–4.47)
LYMPHOCYTES NFR BLD AUTO: 15 % (ref 14–44)
MCH RBC QN AUTO: 26.5 PG (ref 26.8–34.3)
MCHC RBC AUTO-ENTMCNC: 30.8 G/DL (ref 31.4–37.4)
MCV RBC AUTO: 86 FL (ref 82–98)
MONOCYTES # BLD AUTO: 0.75 THOUSAND/ΜL (ref 0.17–1.22)
MONOCYTES NFR BLD AUTO: 7 % (ref 4–12)
NEUTROPHILS # BLD AUTO: 7.73 THOUSANDS/ΜL (ref 1.85–7.62)
NEUTS SEG NFR BLD AUTO: 70 % (ref 43–75)
NRBC BLD AUTO-RTO: 0 /100 WBCS
PLATELET # BLD AUTO: 1161 THOUSANDS/UL (ref 149–390)
PMV BLD AUTO: 10.5 FL (ref 8.9–12.7)
POTASSIUM SERPL-SCNC: 3.9 MMOL/L (ref 3.5–5.3)
PROT SERPL-MCNC: 7.7 G/DL (ref 6.4–8.2)
RBC # BLD AUTO: 4.61 MILLION/UL (ref 3.88–5.62)
RSV RNA RESP QL NAA+PROBE: NEGATIVE
SARS-COV-2 RNA RESP QL NAA+PROBE: NEGATIVE
SODIUM SERPL-SCNC: 140 MMOL/L (ref 136–145)
WBC # BLD AUTO: 11.02 THOUSAND/UL (ref 4.31–10.16)

## 2022-05-26 PROCEDURE — 80053 COMPREHEN METABOLIC PANEL: CPT | Performed by: HOSPITALIST

## 2022-05-26 PROCEDURE — 85025 COMPLETE CBC W/AUTO DIFF WBC: CPT | Performed by: HOSPITALIST

## 2022-05-26 PROCEDURE — 99239 HOSP IP/OBS DSCHRG MGMT >30: CPT | Performed by: HOSPITALIST

## 2022-05-26 PROCEDURE — 0241U HB NFCT DS VIR RESP RNA 4 TRGT: CPT | Performed by: HOSPITALIST

## 2022-05-26 PROCEDURE — 99232 SBSQ HOSP IP/OBS MODERATE 35: CPT | Performed by: INTERNAL MEDICINE

## 2022-05-26 PROCEDURE — 82948 REAGENT STRIP/BLOOD GLUCOSE: CPT

## 2022-05-26 RX ORDER — INSULIN LISPRO 100 [IU]/ML
5 INJECTION, SOLUTION INTRAVENOUS; SUBCUTANEOUS
Refills: 0
Start: 2022-05-26

## 2022-05-26 RX ORDER — POTASSIUM CHLORIDE 20 MEQ/1
40 TABLET, EXTENDED RELEASE ORAL DAILY
Refills: 0
Start: 2022-05-27

## 2022-05-26 RX ORDER — LISINOPRIL 5 MG/1
5 TABLET ORAL DAILY
Status: DISCONTINUED | OUTPATIENT
Start: 2022-05-26 | End: 2022-05-26 | Stop reason: HOSPADM

## 2022-05-26 RX ORDER — HYDROXYUREA 500 MG/1
1000 CAPSULE ORAL EVERY 12 HOURS
Refills: 0
Start: 2022-05-26 | End: 2022-07-01 | Stop reason: SDUPTHER

## 2022-05-26 RX ORDER — FUROSEMIDE 40 MG/1
40 TABLET ORAL DAILY
Status: DISCONTINUED | OUTPATIENT
Start: 2022-05-27 | End: 2022-05-26 | Stop reason: HOSPADM

## 2022-05-26 RX ORDER — LISINOPRIL 5 MG/1
5 TABLET ORAL DAILY
Refills: 0
Start: 2022-05-26 | End: 2022-07-08

## 2022-05-26 RX ORDER — FUROSEMIDE 40 MG/1
40 TABLET ORAL DAILY
Refills: 0
Start: 2022-05-27 | End: 2022-07-08

## 2022-05-26 RX ORDER — INSULIN GLARGINE 100 [IU]/ML
20 INJECTION, SOLUTION SUBCUTANEOUS
Qty: 10 ML | Refills: 0
Start: 2022-05-26

## 2022-05-26 RX ORDER — METOPROLOL SUCCINATE 25 MG/1
25 TABLET, EXTENDED RELEASE ORAL DAILY
Refills: 0
Start: 2022-05-27 | End: 2022-07-08

## 2022-05-26 RX ORDER — ATORVASTATIN CALCIUM 40 MG/1
40 TABLET, FILM COATED ORAL
Refills: 0
Start: 2022-05-26

## 2022-05-26 RX ADMIN — GABAPENTIN 300 MG: 300 CAPSULE ORAL at 08:11

## 2022-05-26 RX ADMIN — CLOPIDOGREL BISULFATE 75 MG: 75 TABLET ORAL at 08:19

## 2022-05-26 RX ADMIN — HYDROXYUREA 1000 MG: 500 CAPSULE ORAL at 08:20

## 2022-05-26 RX ADMIN — LISINOPRIL 5 MG: 5 TABLET ORAL at 11:48

## 2022-05-26 RX ADMIN — METOPROLOL SUCCINATE 25 MG: 25 TABLET, FILM COATED, EXTENDED RELEASE ORAL at 08:19

## 2022-05-26 RX ADMIN — INSULIN LISPRO 5 UNITS: 100 INJECTION, SOLUTION INTRAVENOUS; SUBCUTANEOUS at 08:26

## 2022-05-26 RX ADMIN — CHOLESTYRAMINE 4 G: 4 POWDER, FOR SUSPENSION ORAL at 08:20

## 2022-05-26 RX ADMIN — INSULIN LISPRO 1 UNITS: 100 INJECTION, SOLUTION INTRAVENOUS; SUBCUTANEOUS at 11:47

## 2022-05-26 RX ADMIN — LORATADINE 10 MG: 10 TABLET ORAL at 08:10

## 2022-05-26 RX ADMIN — POTASSIUM CHLORIDE 40 MEQ: 1500 TABLET, EXTENDED RELEASE ORAL at 08:10

## 2022-05-26 RX ADMIN — ASPIRIN 81 MG: 81 TABLET, COATED ORAL at 08:19

## 2022-05-26 RX ADMIN — INSULIN LISPRO 5 UNITS: 100 INJECTION, SOLUTION INTRAVENOUS; SUBCUTANEOUS at 11:46

## 2022-05-26 RX ADMIN — PANTOPRAZOLE SODIUM 40 MG: 40 TABLET, DELAYED RELEASE ORAL at 08:19

## 2022-05-26 NOTE — TELEPHONE ENCOUNTER
Intake received  Pt has  active medicare A & B  Pt also has active [facility] not needed at this time

## 2022-05-26 NOTE — ASSESSMENT & PLAN NOTE
· Presented to the ED with significant work of breathing and rales on auscultation   · Pt placed on BiPAP and given lasix 40IV x1 with significant improvement in clinical appearance  · He was able to be weaned off of BiPAP and placed on 4L NC with SpO2 of 94% and decreased work of breathing  · Suspected etiology is acute pulmonary edema based on CXR findings and reported significant HTN at facility of 198/104   · Weaned to  RA

## 2022-05-26 NOTE — CASE MANAGEMENT
Case Management Discharge Planning Note    Patient name Lavelle Gonsalves  Location  SDU/-01 S* MRN [id number]  : 1950 Date 2022       Current Admission Date: 2022  Current Admission Diagnosis:Acute pulmonary edema [facility])   Patient Active Problem List    Diagnosis Date Noted    [facility] ) 2022    Acute respiratory failure ([place] [address] ) 2022    Coronary artery disease involving native coronary artery 2022    Elevated troponin 2022    Type 2 diabetes mellitus, with long-term current use of insulin ([facility] ) 2022    Thrombocytosis 2022    SIRS (systemic inflammatory response syndrome) ([facility] ) 2022    Diarrhea 2022    Multiple sclerosis ([place] [address] ) 2022    Antiplatelet or antithrombotic long-term use 2022    HTN (hypertension) 2017      LOS (days): 4  Geometric Mean LOS (GMLOS) (days): 3 60  Days to GMLOS:0 1     OBJECTIVE:  Risk of Unplanned Readmission Score: 13 05         Current admission status: Inpatient   Preferred Pharmacy:   [address] [place] [address] [id number]  Phone: [phone] Fax: [phone]    Primary Care Provider: Chucky Frankel, MD    Primary Insurance: MEDICARE  Secondary Insurance: [employer] [facility]    DISCHARGE DETAILS:      Dispatcher Contacted: Yes  Number/Name of Dispatcher: SLETS  Transported by [employer] and Unit #): [id number]  ETA of Transport (Date): 22  ETA of Transport (Time): 1230     Transfer Mode: Stretcher        IMM Given (Date):: 22  IMM Given to[de-identified] Patient     Additional Comments: CM was informed that pt is medically stable for dc today  CM arranged with [facility]TS S for a 1230pm  time to [facility]  CM notified pt, pt's bedside RN Bear Moses, and [facility] of dc time  Message left for pt's wife Shay Gathers to inform of dc time  Facility transfer form and CMN completed  CMN signed and placed in medical record bin      Accepting Facility Name, [address] : AJ[facility]  Receiving Facility/Agency Phone Number: [phone]  Facility/Agency Fax Number: [phone]

## 2022-05-26 NOTE — ASSESSMENT & PLAN NOTE
Lab Results   Component Value Date    HGBA1C 5 3 05/22/2022       Recent Labs     05/25/22  1536 05/25/22  1625 05/25/22  2140 05/26/22  0720   POCGLU 125 120 124 97       Blood Sugar Average: Last 72 hrs:  (P) 116   · Home regimen:  Lantus 35 units HS, Humalog 12 units b i d  meals, and Humalog 10 units HS  · Obtain updated hemoglobin A1c  · cont Lantus 20 units HS and Humalog 5 units t i d  with meals along with SSI

## 2022-05-26 NOTE — DISCHARGE SUMMARY
[place]  [facility] 1950, 70 [facility]  male MRN: [id number]  Unit/Bed#: -01 SDU Encounter: [id number]  Primary Care Provider: Freida Byrd MD   Date and time admitted to hospital: 5/22/2022  9:27 PM    SIRS (systemic inflammatory response syndrome) ([facility] )  Assessment & Plan  · Sirs criteria met on admission:  Tachycardia, tachypnea, and leukocytosis   · Suspect that sirs is secondary to acute pulmonary edema resulting in acute respiratory failure   · No signs or symptoms of acute bacterial infection  · Monitor off antibiotics   · Trend CBC and fever    Thrombocytosis  Assessment & Plan  · Platelets at 8938 on admission   · Prior thrombocytosis noted in 2017 at 700   · Pt on ASA and plavix already   · Hematology consult appreciated - hydrea started - plt improving - dose increased 5/26  · No acute need for pheresis per their report  Reviewed with Dr Stewart Mcleod for [employer] as patient on ASA plavix already   For outpt heme f/u    Type 2 diabetes mellitus, with long-term current use of insulin [facility])  Assessment & Plan  Lab Results   Component Value Date    HGBA1C 5 3 05/22/2022       Recent Labs     05/25/22  1536 05/25/22  1625 05/25/22  2140 05/26/22  0720   POCGLU 125 120 124 97       Blood Sugar Average: Last 72 hrs:  (P) 116   · Home regimen:  Lantus 35 units HS, Humalog 12 units b i d  meals, and Humalog 10 units HS  · Obtain updated hemoglobin A1c  · cont Lantus 20 units HS and Humalog 5 units t i d  with meals along with SSI    Elevated troponin  Assessment & Plan  · HS troponin: 448 < 663 <1090  · Suspect that this is secondary to non mi troponin elevation 2/2 acute heart failure  · Initially held on IV heparin, however once 4 hour returned at 1090 - heparin gtt initiated x 48h  · Cardiology following medical management only per patient/family wishes    Coronary artery disease involving native coronary artery  Assessment & Plan  · Noted on facility paperwork  · Echo in 2017 showing possible hypokinesis of anteroseptal and inferior walls  · Admission EKG with Q waves inferiorly     Acute respiratory failure ([address] )  Assessment & Plan  · Presented to the ED with significant work of breathing and rales on auscultation   · Pt placed on BiPAP and given lasix 40IV x1 with significant improvement in clinical appearance  · He was able to be weaned off of BiPAP and placed on 4L NC with SpO2 of 94% and decreased work of breathing  · Suspected etiology is acute pulmonary edema based on CXR findings and reported significant HTN at facility of 198/104   · Weaned to  RA  HTN (hypertension)  Assessment & Plan  · No home anti-HTN on facility list  · Hypertensive at facility with symptom onset   · SBP ranging 120-150s inpatient   · Will hold on initiating anti-HTN to allow for IV diuresis     Multiple sclerosis (HCC)  Assessment & Plan  · Continue baclofen 10mg PRN and requip HS     Diarrhea  Assessment & Plan  · Currently being worked up by GI due to 4-6 episodes of loose stool daily   · SIBO testing negative     * Acute pulmonary edema (HCC)  Assessment & Plan  · Dyspnea x2 hours at facility with then increased work of breathing with BP of 198/104 at facility prompting ED evaluation   · Note - patient has been on continuous supplemental oxygen via nasal cannula over the last week due to dyspnea at facility and had been ordered morphine q 2 hours p r n  for dyspnea at facility  · Last echo 2017 showed "LVEF 55%  Possible mid to apical anteroseptal and inferior wall hypokinesis   Mild diastolic dysfunction "   · No home diuretics but documented hx of HF on facility papers    · CXR with pulmonary edema on my review   ·   · Placed on BiPAP initially and then weaned to 4L NC once there was decrease in work of breathing   · Given lasix 40mg IV - UOP at 450cc at 3 hours (suspect closer to 600 as pt had wet brief that was unable to be recorded)   · Cont diuresis as noted by cardiology  Plan oral diuretics 5/26  · Update echo - EF 45%, patient prefers only medical management  · Cardiology consult appreciated  · Strict I/O and daily weights   · Fluid and sodium restriction   · Transfer out of SD2  Dc on oral lasix  Hospital Course:     Danie Gallegos is a 70 y o  male patient who originally presented to the hospital on   Admission Orders (From admission, onward)     Ordered        05/22/22 2351  INPATIENT ADMISSION  Once                     due to  respiratory failure in the setting of CHF exacerbation  Patient was diuresed on a Cardiology guidance  Patient does have slightly decreased EF, for which patient and family prefer medical management and do not want cardiac catheterization at this time  Is also noted to have thrombocytosis patient was started on Hydrea, dose was also increased by Hematology  He will need outpatient follow-up with Hematology  Patient is not interested in pheresis and no acute  indications for platelet pheresis per Hematology  - patient is already on aspirin and Plavix  Please see above list of diagnoses and related plan for additional information  Physical Exam:    GEN: No acute distress, comfortable  HEEENT: No JVD, PERRLA, no scleral icterus  RESP: Lungs clear to auscultation bilaterally  CV: RRR, +s1/s2   ABD: SOFT NON TENDER, POSITIVE BOWEL SOUNDS, NO DISTENTION  PSYCH: CALM  NEURO: , NO FOCAL DEFICITS  SKIN: NO RASH  EXTREM: NO EDEMA      Condition at Discharge:  good      Discharge instructions/Information to patient and family:   See after visit summary for information provided to patient and family  Provisions for Follow-Up Care:  See after visit summary for information related to follow-up care and any pertinent home health orders  Disposition:     Other: rehab       Discharge Statement:  I spent 37 minutes discharging the patient  This time was spent on the day of discharge   I had direct contact with the patient on the day of discharge  Greater than 50% of the total time was spent examining patient, answering all patient questions, arranging and discussing plan of care with patient as well as directly providing post-discharge instructions  Additional time then spent on discharge activities  Discharge Medications:  See after visit summary for reconciled discharge medications provided to patient and family        ** Please Note: This note has been constructed using a voice recognition system **

## 2022-05-26 NOTE — ASSESSMENT & PLAN NOTE
· Platelets at 9502 on admission   · Prior thrombocytosis noted in 2017 at 700   · Pt on ASA and plavix already   · Hematology consult appreciated - hydrea started - plt improving - dose increased 5/26  · No acute need for pheresis per their report  Reviewed with Dr Stewart Mcleod for [employer] as patient on ASA plavix already   For outpt heme f/u

## 2022-05-26 NOTE — ASSESSMENT & PLAN NOTE
· Dyspnea x2 hours at facility with then increased work of breathing with BP of 198/104 at facility prompting ED evaluation   · Note - patient has been on continuous supplemental oxygen via nasal cannula over the last week due to dyspnea at facility and had been ordered morphine q 2 hours p r n  for dyspnea at facility  · Last echo 2017 showed "LVEF 55%  Possible mid to apical anteroseptal and inferior wall hypokinesis  Mild diastolic dysfunction "   · No home diuretics but documented hx of HF on facility papers    · CXR with pulmonary edema on my review   ·   · Placed on BiPAP initially and then weaned to 4L NC once there was decrease in work of breathing   · Given lasix 40mg IV - UOP at 450cc at 3 hours (suspect closer to 600 as pt had wet brief that was unable to be recorded)   · Cont diuresis as noted by cardiology  Plan oral diuretics 5/26  · Update echo - EF 45%, patient prefers only medical management  · Cardiology consult appreciated  · Strict I/O and daily weights   · Fluid and sodium restriction   · Transfer out of SD2  Dc on oral lasix

## 2022-05-26 NOTE — DISCHARGE INSTRUCTIONS
PLEASE WEIGH PATIENT DAILY; IF WEIGHT IS INCREASED BY 3 LBS IN 24 HRS OR 5 LBS IN A WEEK, PLEASE CALL CARDIOLOGY OFFICE FOR FURTHER INSTRUCTIONS    PATIENT SHOULD FOLLOW LOW SODIUM DIET; 2000 MG OR LESS IN A DAY

## 2022-05-26 NOTE — PROGRESS NOTES
General Cardiology   Progress Note -  Team One   Andrew Obando 70 y o  male MRN: [id number]    Unit/Bed#: -01 SDU Encounter: [id number]    Assessment:    Acute hypoxic respiratory failure  · To the ED from [facility] with c/o B/L arm pain and dyspnea that worsened when being moved to bathroom by staff  · Chest xray with evidence of pulm edema by my review; had required BIPAP on admit due to increased WOB  · Of note; patient has been on 4 L NC at his nursing facility and has been receiving morphine for "dyspnea" for at least the past 2 weeks; unclear if the etiology behind this has been worked up; likely was secondary to worsening pulm edema related to new cardiomyopathy as he is now on RA today with adequate O2 sats after IV diuresis       Acute systolic/diastolic HF  · Presentation and chest xray as above; BP at nursing facility reported as 198/104 PTA  · proBNP 389  · IV lasix 40 mg BID ordered on admit and continued 5/24 due to evidence of continued bibasilar rales; d/c'd as of yesterday AM due to elevated BUN/creat  · Today his creat remains above baseline at 1 3 (but may need to be new baseline in order to maintain euvolemia) but BUN is up to 32 today from 28 yesterday; we will continue to hold all IV/PO diuretics again today  · Would recommend Lasix 40 mg daily upon discharge that can likely be started tomorrow  · I/O: total UO in 24 hrs is 1 4 L; no PO intake recorded for night shift, cannot calculate negative status  · Weight this AM is 196 lbs down from 208 lbs yesterday; dry weight is unknown  · TTE from 5/23: EF 45%; akinesis of the apical anterior wall and apex; hypokinesis of the mid anterior wall; grade 2 DD; mild TR  · Daily weights; strict I/O's; 2 g Na restricted diet with fluid restriction     Elevated troponin with B/L arm pain  · Presentation as above; has had BARCENAS x 2 weeks but noted B/L UE "tingling" in his triceps with exertion; no c/o CP  · EKG on admit sinus tachycardia with non-specific ST/T wave changes  · hsTn 448->663->1,090->1,439  · IV heparin drip started by SLIM per ACS protocol  · It is possible that his troponin are elevated due to accelerated HTN and flash pulm edema on admit BUT given his risk factors for CAD (age/fam hx of CAD/HTN/DM2 ) in conjunction with recent BARCENAS and B/L tricep "tingling" with exertion PTA, there could be concern for NSTEMI; to err on the side of caution, we will continue IV heparin drip for 48 hrs (stopped at 0200 this AM) and treat patient medically for presumed CAD; patient does not wish to pursue cardiac cath and would rather medical management   · Continue aspirin 81 mg QD; Toprol XL 25 mg QD and Lipitor 40 mg QD started during consult, continue outpatient dose of Plavix 75 mg QD    Cardiomyopathy  · TTE as above; EF 45% (had been 55% in 2017); etiology likely ischemic given wall motion abnormalities  · Patient wishes to pursue medical management and does not want to pursue cardiac catheterization  · GDMT: Toprol XL 25 mg QD; will add lisinopril 5 mg QD this AM     Essential HTN  · SBP averaging 110's-120's; HTN listed in [address]  · Not on any anti-hypertensives as outpatient  · Toprol XL 25 mg QD started due to probable underlying CAD  · Will add lisinopril 5 mg QD this AM     CAD  · Noted on paperwork from facility; no prior documentation  · GDMT: aspirin 81 mg QD, Plavix 75 mg QD;  Toprol XL 25 mg QD and Lipitor 40 mg QD started day of consult     DM2  · HgbA1c 5 3  · Management per primary team     thrombocytosis  · PLT 1156 on admit; prior hx of thrombocytosis noted 2017  · On ASA/Plavix as outpatient  · Hematology consulted; placed on hydroxyurea     Multiple sclerosis       Plan/Recommendations:  · Today his creat remains above baseline at 1 3 (but may need to be new baseline in order to maintain euvolemia) but BUN is up to 32 today from 28 yesterday; we will continue to hold all IV/PO diuretics again today  · Would recommend Lasix 40 mg daily upon discharge which can likely be started tomorrow  · Continue aspirin 81 mg QD, Toprol XL 25 mg QD, Lipitor 40 mg QD and Plavix 75mg QD  · Will start lisinopril 5 mg QD this AM for GDMT for CM      _________________________________________________________________    Subjective  Patient states that overall he feels well this morning  He continues to note some cough with sputum production but states it has gotten a little better since admission  Review of Systems   Constitutional: Negative for chills, fever and malaise/fatigue  HENT: Negative for congestion  Cardiovascular: Negative for chest pain, dyspnea on exertion, leg swelling, orthopnea and palpitations  Respiratory: Positive for cough and sputum production  Negative for shortness of breath (no SOB at rest) and wheezing  Endocrine: Negative  Hematologic/Lymphatic: Negative  Skin: Negative  Musculoskeletal: Negative  Gastrointestinal: Negative for bloating, abdominal pain, nausea and vomiting  Genitourinary: Negative  Neurological: Negative for dizziness and light-headedness  Psychiatric/Behavioral: Negative  All other systems reviewed and are negative  Objective:   Vitals: Blood pressure 119/56, pulse 69, temperature 98 1 °F (36 7 °C), temperature source Oral, resp  rate 16, height 5' 10" (1 778 m), weight 89 3 kg (196 lb 13 9 oz), SpO2 90 %  ,     Wt Readings from Last 3 Encounters:   05/26/22 89 3 kg (196 lb 13 9 oz)   02/25/22 94 4 kg (208 lb 3 2 oz)   11/29/13 93 4 kg (206 lb)        Lab Results   Component Value Date    CREATININE 1 37 (H) 05/26/2022    CREATININE 1 33 (H) 05/25/2022    CREATININE 1 35 (H) 05/24/2022         Body mass index is 28 25 kg/m²  ,     Systolic (75HSJ), ZUR:364 , Min:113 , JJJ:706     Diastolic (62VZQ), PUS:08, Min:56, Max:65          Intake/Output Summary (Last 24 hours) at 5/26/2022 0819  Last data filed at 5/26/2022 0500  Gross per 24 hour   Intake 600 ml   Output 1450 ml   Net -850 ml Weight (last 2 days)     Date/Time Weight    05/26/22 0600 89 3 (196 87)    05/25/22 0536 94 5 (208 34)    05/25/22 0440 94 5 (208 34)    05/24/22 0600 90 1 (198 63)            Telemetry Review: sinus rhythm with rates in the 80's       Physical Exam  Vitals reviewed  Constitutional:       General: He is not in acute distress  HENT:      Head: Normocephalic and atraumatic  Mouth/Throat:      Mouth: Mucous membranes are moist    Cardiovascular:      Rate and Rhythm: Normal rate and regular rhythm  Heart sounds: Normal heart sounds, S1 normal and S2 normal  No murmur heard  Pulmonary:      Effort: Pulmonary effort is normal  No respiratory distress  Breath sounds: Normal breath sounds  Abdominal:      General: Bowel sounds are normal  There is no distension  Palpations: Abdomen is soft  Musculoskeletal:         General: Normal range of motion  Cervical back: Normal range of motion and neck supple  Right lower leg: No edema  Left lower leg: No edema  Skin:     General: Skin is warm and dry  Neurological:      Mental Status: He is alert and oriented to person, place, and time     Psychiatric:         Mood and Affect: Mood normal          LABORATORY RESULTS      CBC with diff:   Results from last 7 days   Lab Units 05/26/22  0451 05/25/22  0449 05/24/22  0849 05/23/22  0241 05/22/22  2231 05/22/22  2150   WBC Thousand/uL 11 02* 10 64* 11 59* 14 40* 14 28*  --    HEMOGLOBIN g/dL 12 2 12 5 11 8* 12 9 12 6  --    I STAT HEMOGLOBIN g/dl  --   --   --   --   --  13 9   HEMATOCRIT % 39 6 41 0 38 7 41 8 41 5  --    HEMATOCRIT, ISTAT %  --   --   --   --   --  41   MCV fL 86 87 87 87 87  --    PLATELETS Thousands/uL 1,161* 1,157* 1,154* 1,251* 1,156*  --    MCH pg 26 5* 26 5* 26 5* 27 0 26 5*  --    MCHC g/dL 30 8* 30 5* 30 5* 30 9* 30 4*  --    RDW % 19 2* 19 4* 19 7* 19 8* 19 7*  --    MPV fL 10 5 10 3 10 6 10 6 10 8  --    NRBC AUTO /100 WBCs 0 0 0  --  0  -- CMP:  Results from last 7 days   Lab Units 05/26/22 0451 05/25/22 0449 05/24/22 0159 05/23/22 0241 05/22/22 2150 05/22/22  2144   POTASSIUM mmol/L 3 9 3 8 3 2* 3 7  --  3 6   CHLORIDE mmol/L 105 105 105 104  --  107   CO2 mmol/L 27 28 30 30  --  24   CO2, I-STAT mmol/L  --   --   --   --  24  --    BUN mg/dL 32* 28* 23 21  --  21   CREATININE mg/dL 1 37* 1 33* 1 35* 1 29  --  1 18   GLUCOSE, ISTAT mg/dl  --   --   --   --  249*  --    CALCIUM mg/dL 9 3 9 1 8 6 8 6  --  8 4   AST U/L 36 42  --  31  --  28   ALT U/L 18 16  --  17  --  12   ALK PHOS U/L 110 110  --  114  --  121*   EGFR ml/min/1 73sq m 51 53 52 55  --  61       BMP:  Results from last 7 days   Lab Units 05/26/22 0451 05/25/22 0449 05/24/22 0159 05/23/22 0241 05/22/22 2150 05/22/22 2144   POTASSIUM mmol/L 3 9 3 8 3 2* 3 7  --  3 6   CHLORIDE mmol/L 105 105 105 104  --  107   CO2 mmol/L 27 28 30 30  --  24   CO2, I-STAT mmol/L  --   --   --   --  24  --    BUN mg/dL 32* 28* 23 21  --  21   CREATININE mg/dL 1 37* 1 33* 1 35* 1 29  --  1 18   GLUCOSE, ISTAT mg/dl  --   --   --   --  249*  --    CALCIUM mg/dL 9 3 9 1 8 6 8 6  --  8 4       Lab Results   Component Value Date    NTBNP 389 (H) 05/22/2022             Results from last 7 days   Lab Units 05/23/22  0241   MAGNESIUM mg/dL 2 3          Results from last 7 days   Lab Units 05/22/22 2231   HEMOGLOBIN A1C % 5 3              Results from last 7 days   Lab Units 05/22/22  2144   INR  1 13       Lipid Profile:   No results found for: CHOL  Lab Results   Component Value Date    HDL 29 (L) 05/24/2022     Lab Results   Component Value Date    LDLCALC 124 (H) 05/24/2022     Lab Results   Component Value Date    TRIG 122 05/24/2022       Cardiac testing:   No results found for this or any previous visit  No results found for this or any previous visit  No results found for this or any previous visit  No valid procedures specified    No results found for this or any previous visit         Meds/Allergies   current meds:   Current Facility-Administered Medications   Medication Dose Route Frequency    aspirin (ECOTRIN LOW STRENGTH) EC tablet 81 mg  81 mg Oral Daily    atorvastatin (LIPITOR) tablet 40 mg  40 mg Oral Daily With Dinner    baclofen tablet 10 mg  10 mg Oral Q6H PRN    cholestyramine sugar free (QUESTRAN LIGHT) packet 4 g  4 g Oral BID    clopidogrel (PLAVIX) tablet 75 mg  75 mg Oral Daily    gabapentin (NEURONTIN) capsule 300 mg  300 mg Oral BID    gabapentin (NEURONTIN) capsule 600 mg  600 mg Oral HS    hydroxyurea (HYDREA) capsule 1,000 mg  1,000 mg Oral Q12H    insulin glargine (LANTUS) subcutaneous injection 20 Units 0 2 mL  20 Units Subcutaneous HS    insulin lispro (HumaLOG) 100 units/mL subcutaneous injection 1-5 Units  1-5 Units Subcutaneous HS    insulin lispro (HumaLOG) 100 units/mL subcutaneous injection 1-6 Units  1-6 Units Subcutaneous TID AC    insulin lispro (HumaLOG) 100 units/mL subcutaneous injection 5 Units  5 Units Subcutaneous TID With Meals    loratadine (CLARITIN) tablet 10 mg  10 mg Oral Daily    metoprolol succinate (TOPROL-XL) 24 hr tablet 25 mg  25 mg Oral Daily    pantoprazole (PROTONIX) EC tablet 40 mg  40 mg Oral Daily Before Breakfast    potassium chloride (K-DUR,KLOR-CON) CR tablet 40 mEq  40 mEq Oral Daily    rOPINIRole (REQUIP) tablet 0 5 mg  0 5 mg Oral HS    tamsulosin (FLOMAX) capsule 0 4 mg  0 4 mg Oral Daily With Dinner     Medications Prior to Admission   Medication    acetaminophen (TYLENOL) 325 mg tablet    aspirin (ECOTRIN LOW STRENGTH) 81 mg EC tablet    baclofen 10 mg tablet    Cholecalciferol (Vitamin D3) 1 25 MG (77979 UT) CAPS    cholestyramine sugar free (QUESTRAN LIGHT) 4 g packet    clopidogrel (PLAVIX) 75 mg tablet    gabapentin (NEURONTIN) 300 mg capsule    gabapentin (NEURONTIN) 600 MG tablet    insulin glargine (LANTUS) 100 units/mL subcutaneous injection    insulin lispro (HumaLOG) 100 units/mL injection    insulin lispro (insulin lispro) 100 units/mL injection    loperamide (IMODIUM) 2 mg capsule    loratadine (CLARITIN) 10 mg tablet    morphine 20 MG/5ML solution    pantoprazole (PROTONIX) 40 mg tablet    polyethylene glycol-propylene glycol (Systane) 0 4-0 3 %    rOPINIRole (REQUIP) 0 5 mg tablet    tamsulosin (FLOMAX) 0 4 mg    bisacodyl (Dulcolax) 10 mg suppository    EMOLLIENT CREAM & WOUND GEL EX    Glucagon, rDNA, (Glucagon Emergency) 1 MG KIT    magnesium hydroxide (MILK OF MAGNESIA) 400 mg/5 mL oral suspension    polyethylene glycol (GOLYTELY) 4000 mL solution    Probiotic Product (PROBIOTIC PO)    sodium phosphate-biphosphate (FLEET) 7-19 g 118 mL enema    white petrolatum-corn starch-lanolin (TRIPLE PASTE) 12 8 % ointment            Counseling / Coordination of Care  Total floor / unit time spent today 20 minutes  Greater than 50% of total time was spent with the patient and / or family counseling and / or coordination of care  ** Please Note: Dragon 360 Dictation voice to text software may have been used in the creation of this document   **

## 2022-05-26 NOTE — PROGRESS NOTES
Pastoral Care Progress Note    2022  Patient: Renea Rockwell : 1950  Admission Date & Time: 2022  MRN: [id number] [facility]: [id number]                     Chaplaincy Interventions Utilized:   Relationship Building: Cultivated a relationship of care and support  Patient said that he was feeling okay  He is from the area and says he is one of 11 siblings, some of whom live in the area  He welcomed a prayer for healing     Ritual: Provided prayer   22 1000   Clinical Encounter Type   Visited With Patient   Routine Visit Introduction   Referral To   (census/rounds)   Mormonism Encounters   Mormonism Needs Prayer

## 2022-05-26 NOTE — TELEPHONE ENCOUNTER
New Patient Intake Form   Patient Details:    Renea Rockwell  1950    Appointment Information   Who is calling to schedule? Lexii Failing   If not self, what is the caller's name? Please put name of RBC nurse as well  NA   DID YOU CONFIRM INSURANCE WITH PATIENT? Patient is currently inpatient at [facility]   Referring provider Dr Laura Hoffman   What is the diagnosis? Thrombocytosis     Is there a confirmed tissue diagnosis? NA   Is there a biopsy ordered or pending? Please specify dates   NA     Is patient aware of diagnosis? Yes   Have you had any imaging or labs done? If yes, where? (If imaging done outside of [facility], please remind patient to bring a disk ) Yes-[facility]     If imaging done at outside facility, did you instruct patient to obtain discs and bring to visit? NA   Have you been seen by another Oncologist/Hematologist?  If so, who and where? No   Are the records in [facility] or Care Everywhere? Yes   Does the patient have records at another facility/hospital? No   If yes, Name of facility, city and state where facility is located  Did you instruct patient to have records faxed to [facility] and provide rightx number? NA   Preferred [place]   Is the patient willing to be seen by another provider? (This is for breast patients only) NA     Did you send new patient paperwork? Email or mail? NA   Miscellaneous Information: The patient has been scheduled for a HFU appointment with Dr Baldomero Haney in the [facility] office on 6/8 at 1120

## 2022-05-28 LAB — SCAN RESULT: NORMAL

## 2022-06-01 ENCOUNTER — PATIENT OUTREACH (OUTPATIENT)
Dept: CASE MANAGEMENT | Facility: OTHER | Age: 72
End: 2022-06-01

## 2022-06-01 NOTE — PROGRESS NOTES
Patient identified as New Medicare Bundle through report  Email with bundle communication tool sent to facility with bundle dates, DRG, and LOS  This care manager assistant will continue to monitor via chart and CarePort review throughout bundle episode  Update received the patient has a LCD of 6/5/22 and will convert back to LTC  This care manager assistant will continue to monitor via chart review throughout bundle episode

## 2022-06-06 ENCOUNTER — PATIENT OUTREACH (OUTPATIENT)
Dept: CASE MANAGEMENT | Facility: OTHER | Age: 72
End: 2022-06-06

## 2022-06-06 NOTE — PROGRESS NOTES
In-basket message received regarding a 71 YO, male patient with PMH of DM type 2, HTN, CAD,  Thrombocytopenia and multiple sclerosis that has been identified as a Medicare Bundle  Patient was initially  inpatient from 5/22/22-5/26/22 secondary to pulmonary edema/CHF  Patient was stabilized and transferred to [facility] on 5/26/22 for STR  Communication this AM reveals that patient transitioned to LTC at [facility] to LTC  This RN care manager will reach out periodically to facility and follow the chart electronically for updates regarding patient's condition

## 2022-06-06 NOTE — PROGRESS NOTES
Chart review complete the patient transitioned to LTC on 6/5/22 at [facility]  A email was sent to the facility requesting discharge instructions  When CM assistant has received the Discharge paperwork CM assistant will attach to this episode  I have removed myself off of the care team, added the CM to the care team who will follow the patient through the bundle episode, sent the care manager a inbasket notifying them of the bundle episode, updated the BPCI form, and updated the care coordination note

## 2022-06-08 ENCOUNTER — TELEPHONE (OUTPATIENT)
Dept: HEMATOLOGY ONCOLOGY | Facility: HOSPITAL | Age: 72
End: 2022-06-08

## 2022-06-08 ENCOUNTER — OFFICE VISIT (OUTPATIENT)
Dept: HEMATOLOGY ONCOLOGY | Facility: HOSPITAL | Age: 72
End: 2022-06-08
Payer: MEDICARE

## 2022-06-08 VITALS
SYSTOLIC BLOOD PRESSURE: 106 MMHG | OXYGEN SATURATION: 97 % | DIASTOLIC BLOOD PRESSURE: 58 MMHG | BODY MASS INDEX: 28.25 KG/M2 | TEMPERATURE: 96.4 F | RESPIRATION RATE: 16 BRPM | HEART RATE: 70 BPM | HEIGHT: 70 IN

## 2022-06-08 DIAGNOSIS — D47.3 ESSENTIAL (HEMORRHAGIC) THROMBOCYTHEMIA (HCC): Primary | ICD-10-CM

## 2022-06-08 PROCEDURE — 99214 OFFICE O/P EST MOD 30 MIN: CPT | Performed by: INTERNAL MEDICINE

## 2022-06-08 NOTE — TELEPHONE ENCOUNTER
[place] [facility] [place] spoke with nurse  Confirmed and sent fax of CBC script for pt to have blood work done every 2 weeks  They were able to accommodate this every 2 weeks for pt to have cbc drawn

## 2022-06-11 PROBLEM — D47.3 ESSENTIAL (HEMORRHAGIC) THROMBOCYTHEMIA (HCC): Status: ACTIVE | Noted: 2022-05-23

## 2022-06-11 NOTE — PROGRESS NOTES
HEMATOLOGY / [address] FOLLOW UP NOTE    Primary Care Provider: Yonatan Carreon MD  Referring Provider:    MRN: [id number]  : 1950    Reason for Encounter: hospital follow up, new diagnosis ET    Oncology History Overview Note     2022 - diagnosed with essential thrombocythemia - JAK2 [id number] +      Hydrea 1000 mg BID    Interval History: patient presents for hospital follow up where he was found to have a platelet count of 1 2 million  He had a CHF exacerbation  And a TnI spill  Platelet pheresis was deferred due to his PS and comorbidities  He has MS with significant neurologic compromise and is a resident a [facility]  He denies any perceived side effects from hydrea  He denies HA  No CP/SOB today  No itching  No fevers or NS  REVIEW OF SYSTEMS:  Please note that a 14-point review of systems was performed to include Constitutional, HEENT, Respiratory, CVS, GI, , Musculoskeletal, Integumentary, Neurologic, Rheumatologic, Endocrinologic, Psychiatric, Lymphatic, and Hematologic/Oncologic systems were reviewed and are negative unless otherwise stated in HPI  Positive and negative findings pertinent to this evaluation are incorporated into the history of present illness  ECOG PS: 3    PROBLEM LIST:  Patient Active Problem List:    Diarrhea    Multiple sclerosis (HCC)    Antiplatelet or antithrombotic long-term use    HTN (hypertension)    Acute pulmonary edema (HCC)    Acute respiratory failure (HCC)    Coronary artery disease involving native coronary artery    Elevated troponin    Type 2 diabetes mellitus, with long-term current use of insulin (HCC)    Essential (hemorrhagic) thrombocythemia (HCC)    SIRS (systemic inflammatory response syndrome) ([facility] [address] )      Assessment / Plan:  Patient has a new diagnosis of ET  We will continue hydrea 1000 mg BID  We will check a cbc today and every 2 weeks   I had my nurse call Melody iwlder to make sure this gets done and we get the results  I will adjust his hydrea based on this  He is also on ASA and plavix  He will RTC in 6 mos  I spent 30 minutes on chart review, face to face counseling time, coordination of care and documentation  Past Medical History:  has a past medical history of Atrial fibrillation ([facility] [address] ), BPH (benign prostatic hyperplasia), Congestive heart failure (CHF) ([facility] [address] ), Diabetes mellitus ([facility] Hyperlipidemia, Hypertension, Multiple sclerosis ([facility] ), Neuropathy, and RLS (restless legs syndrome)  PAST SURGICAL HISTORY:  has no past surgical history on file      CURRENT MEDICATIONS  Current Outpatient Medications:  acetaminophen (TYLENOL) 325 mg tablet, Take 650 mg by mouth every 6 (six) hours as needed for mild pain, Disp: , Rfl:   aspirin (ECOTRIN LOW STRENGTH) 81 mg EC tablet, Take 81 mg by mouth in the morning , Disp: , Rfl:   atorvastatin (LIPITOR) 40 mg tablet, Take 1 tablet (40 mg total) by mouth daily with dinner, Disp: , Rfl: 0  baclofen 10 mg tablet, Take 10 mg by mouth every 6 (six) hours as needed for muscle spasms, Disp: , Rfl:   cholestyramine sugar free (QUESTRAN LIGHT) 4 g packet, Take 4 g by mouth 2 (two) times a day, Disp: , Rfl:   clopidogrel (PLAVIX) 75 mg tablet, Take 75 mg by mouth daily, Disp: , Rfl:   furosemide (LASIX) 40 mg tablet, Take 1 tablet (40 mg total) by mouth daily, Disp: , Rfl: 0  gabapentin (NEURONTIN) 300 mg capsule, Take 300 mg by mouth in the morning and 300 mg in the evening , Disp: , Rfl:   gabapentin (NEURONTIN) 600 MG tablet, Take 600 mg by mouth daily at bedtime, Disp: , Rfl:   hydroxyurea (HYDREA) 500 mg capsule, Take 2 capsules (1,000 mg total) by mouth every 12 (twelve) hours, Disp: , Rfl: 0  insulin glargine (LANTUS) 100 units/mL subcutaneous injection, Inject 20 Units under the skin daily at bedtime, Disp: 10 mL, Rfl: 0  insulin lispro (HumaLOG) 100 units/mL injection, Inject 5 Units under the skin 3 (three) times a day with meals, Disp: , Rfl: 0  lisinopril (ZESTRIL) 5 mg tablet, Take 1 tablet (5 mg total) by mouth daily, Disp: , Rfl: 0  loratadine (CLARITIN) 10 mg tablet, Take 10 mg by mouth daily, Disp: , Rfl:   metoprolol succinate (TOPROL-XL) 25 mg 24 hr tablet, Take 1 tablet (25 mg total) by mouth daily, Disp: , Rfl: 0  pantoprazole (PROTONIX) 40 mg tablet, Take 40 mg by mouth daily, Disp: , Rfl:   potassium chloride (K-DUR,KLOR-CON) 20 mEq tablet, Take 2 tablets (40 mEq total) by mouth daily, Disp: , Rfl: 0  rOPINIRole (REQUIP) 0 5 mg tablet, Take 0 5 mg by mouth daily at bedtime, Disp: , Rfl:   tamsulosin (FLOMAX) 0 4 mg, Take 0 4 mg by mouth daily with dinner, Disp: , Rfl:     No current facility-administered medications for this visit  SOCIAL HISTORY:  reports that he has never smoked  He has never used smokeless tobacco  He reports previous alcohol use  He reports that he does not use drugs  FAMILY HISTORY:  family history is not on file  ALLERGIES:  is allergic to metformin  Physical Exam:  Vital Signs:   /58 (BP Location: Right arm, Patient Position: Sitting, Cuff Size: Adult)   Pulse 70   Temp (!) 96 4 °F (35 8 °C) (Tympanic)   Resp 16   Ht 5' 10" (1 778 m)   SpO2 97%   BMI 28 25 kg/m²   Smoking Status Never Smoker   BSA 2 07 m²   Body mass index is 28 25 kg/m²  Body surface area is 2 07 meters squared  GEN: Alert, awake oriented x3, in no acute distress  HEENT- No pallor, icterus, cyanosis, no oral mucosal lesions,   LAD - no palpable cervical, clavicle, axillary, inguinal LAD  Heart- normal S1 S2, regular rate and rhythm, No murmur, rubs     Lungs- clear breathing sound bilateral    Abdomen- soft, Non tender, bowel sounds present  Extremities- No cyanosis, clubbing, edema  Neuro- left sided weakness    Labs:  Lab Results      Component                Value               Date                      WBC                      11 02 (H)           05/26/2022                HGB                      12 2                05/26/2022 HCT                      39 6                05/26/2022                MCV                      86                  05/26/2022                PLT                      1,161 (HH)          05/26/2022            Lab Results      Component                Value               Date                      SODIUM                   140                 05/26/2022                K                        3 9                 05/26/2022                CL                       105                 05/26/2022                CO2                      27                  05/26/2022                AGAP                     8                   05/26/2022                BUN                      32 (H)              05/26/2022                CREATININE               1 37 (H)            05/26/2022                GLUC                     105                 05/26/2022                CALCIUM                  9 3                 05/26/2022                AST                      36                  05/26/2022                ALT                      18                  05/26/2022                ALKPHOS                  110                 05/26/2022                TP                       7 7                 05/26/2022                TBILI                    0 40                05/26/2022                EGFR                     51                  05/26/2022

## 2022-06-13 ENCOUNTER — TELEPHONE (OUTPATIENT)
Dept: HEMATOLOGY ONCOLOGY | Facility: HOSPITAL | Age: 72
End: 2022-06-13

## 2022-06-13 NOTE — TELEPHONE ENCOUNTER
Returned call and spoke to pt spouse advising her that since pt has been taking hydrea to treat his thrombocytopenia plts has gone done to 1,161 more stable high number compared to recent hospitalization at 1 2 million  Verbalized good understanding that we are continuing to check counts as pt continues on treatment

## 2022-06-20 PROBLEM — I50.32 CHRONIC HEART FAILURE WITH PRESERVED EJECTION FRACTION (HCC): Status: ACTIVE | Noted: 2022-06-20

## 2022-06-20 PROBLEM — R93.1 ABNORMAL ECHOCARDIOGRAM: Status: ACTIVE | Noted: 2022-06-20

## 2022-06-20 PROBLEM — I25.5 ISCHEMIC CARDIOMYOPATHY: Status: ACTIVE | Noted: 2022-06-20

## 2022-06-20 PROBLEM — E78.00 PURE HYPERCHOLESTEROLEMIA: Status: ACTIVE | Noted: 2022-06-20

## 2022-06-20 NOTE — PROGRESS NOTES
Cardiology  Follow Up   Office Visit Note  Andrew Obando   70 y o    male   MRN: [id number]  [address] [address] [phone]    PCP: Robert Watts MD  Cardiologist:  Will be Dr Pop Cordova        [facility] [facility]          Summary of recommendations  Low-sodium diet  Educational information provided  Regular weights  He is currently being weighed every Tuesday   If he has intercurrent weight gain he may benefit from up titration of his diuretic  He should have a follow-up nonfasting BMP given his new medications  His CBC is being followed monthly per Hematology  Repeat fasting lipid profile, 2 months  Follow up will be scheduled with Dr Pop Cordova 2 months        Assessment/plan  Respiratory failure, recent hospital admission  5/22-5/26/22  CAD, recorded on the facility chart  Not clear what basis this diagnosis was made  Mild cardiomyopathy, EF 45% with wall motion abnormalities-segments are akinetic: Apical anterior and apex  Following segments are hypokinetic: Mid anterior  Medical management  -on aspirin, Plavix (DA PT given thrombocytosis per Hematology), statin, beta-blocker, ACE-inhibitor, diuretic  Elevated troponin, likely non myocardial injury secondary to acute heart failure, accelerated hypertension  · Patient desires a conservative approach  Declines left heart catheterization  Chronic heart failure preserved ejection fraction  Wt Readings from Last 3 Encounters:   05/26/22 89 3 kg (196 lb 13 9 oz)   02/25/22 94 4 kg (208 lb 3 2 oz)   11/29/13 93 4 kg (206 lb)     -beta-blocker:   Toprol 25 mg daily  --Diuretic:   Lasix 40 mg daily  --ACE/ARB/ARNI:  lisinopril 5 mg daily    --MRA:   --2 g sodium diet, 1800 cc fluid restriction  Daily weights, call weight gain 2-3 lb in 1 day or 5 lb in 5 days  Hypertension, essential   /60  Hyperlipidemia, recently started on atorvastatin 40 mg daily    5/24/22   Type 2 DM   Hemoglobin A1c 5 3  Thrombocytosis,  On aspirin /Plavix chronically chronically     Hematology added hydroxyurea  Last platelet count 8936 K  Multiple sclerosis  Cardiac testing   TTE 5/23/22  EF 45%  Grade 2 diastolic dysfunction  Following segments are akinetic: Apical anterior and apex  Following segments are hypokinetic: Mid anterior  Mild TR            HPI  Angus Rosales is a 69 yo male with CAD, essential hypertension, diabetes mellitus  He has thrombocytosis, history of multiple sclerosis  He resides at [facility]  In the past he saw [facility] PT Cardiology  He was diagnosed with atrial tachycardia  The basis for his diagnosis of CAD noted on chart information at the facility, is not clear  Adm 5/22-5/26/22  CC: Worsening shortness of breath  Notes the facility reports he has been on oxygen intermittently over last few weeks  He has also been ordered morphine p r n  for dyspnea  BP on admission 198/104  Found to be in acute decompensated heart failure   Chest x-ray showed pulmonary edema  He required treatment with BiPAP on admission due to increased work of breathing  EKG:NSR with PACs, Inferior infarct, anteroseptal infarct  Nonspecific ST T wave abnormality  Echo:  EF 45%  Wall motion abnormalities  hsTn 448->663->1,090->1,439  Followed by Cardiology   Diuresed  Troponin elevation likely nonischemic myocardial injury secondary decompensated heart failure and underlying chronic coronary disease  Patient requested a conservative approach  Medical management pursued  Started on Toprol, lisinopril and Lipitor; previously on Plavix as an outpatient, which was continued    6/21/22  Hospital follow-up  ROS: He has no complaints  /60  He is in a wheelchair      He remains on guideline directed medical therapy for his CAD/ ICM: Aspirin, Plavix, statin, beta-blocker, ACE-inhibitor  He is on Lasix 40 mg daily  Will continue the current plan recommend follow-up BMP, follow-up lipids        I have spent 25 minutes with Patient  today in which greater than 50% of this time was spent in counseling/coordination of care regarding Intructions for management, Patient and family education, Importance of tx compliance and Risk factor reductions  Assessment  Diagnoses and all orders for this visit:    Hospital discharge follow-up    Ischemic cardiomyopathy  -     Basic metabolic panel; Future    Chronic heart failure with preserved ejection fraction (HCC)    Coronary artery disease involving native coronary artery of native heart without angina pectoris    Abnormal echocardiogram    Primary hypertension    Pure hypercholesterolemia  -     Lipid panel; Future    PVD (peripheral vascular disease) ([facility])    Stage 3 chronic kidney disease, unspecified whether stage 3a or 3b CKD ([facility] [address] )          Past Medical History:   Diagnosis Date    Atrial fibrillation (HCC)     BPH (benign prostatic hyperplasia)     Congestive heart failure (CHF) (HCC)     Diabetes mellitus (HCC)     Hyperlipidemia     Hypertension     Multiple sclerosis (HCC)     Neuropathy     RLS (restless legs syndrome)        Review of Systems   Constitutional: Positive for malaise/fatigue  Negative for chills  Cardiovascular: Negative for chest pain, claudication, cyanosis, dyspnea on exertion, irregular heartbeat, leg swelling, near-syncope, orthopnea, palpitations, paroxysmal nocturnal dyspnea and syncope  Respiratory: Negative for cough and shortness of breath  Musculoskeletal:        Gait dysfunction  Requires transfer to  Bed-bound  Today, in a  wheelchair   Gastrointestinal: Negative for heartburn and nausea  Neurological: Negative for dizziness, focal weakness, headaches, light-headedness and weakness  All other systems reviewed and are negative  Allergies   Allergen Reactions    Metformin GI Intolerance           Current Outpatient Medications:     acetaminophen (TYLENOL) 325 mg tablet, Take 650 mg by mouth every 6 (six) hours as needed for mild pain, Disp: , Rfl:     aspirin (ECOTRIN LOW STRENGTH) 81 mg EC tablet, Take 81 mg by mouth in the morning , Disp: , Rfl:     atorvastatin (LIPITOR) 40 mg tablet, Take 1 tablet (40 mg total) by mouth daily with dinner, Disp: , Rfl: 0    baclofen 10 mg tablet, Take 10 mg by mouth every 6 (six) hours as needed for muscle spasms, Disp: , Rfl:     cholestyramine sugar free (QUESTRAN LIGHT) 4 g packet, Take 4 g by mouth 2 (two) times a day, Disp: , Rfl:     clopidogrel (PLAVIX) 75 mg tablet, Take 75 mg by mouth daily, Disp: , Rfl:     furosemide (LASIX) 40 mg tablet, Take 1 tablet (40 mg total) by mouth daily, Disp: , Rfl: 0    gabapentin (NEURONTIN) 300 mg capsule, Take 300 mg by mouth in the morning and 300 mg in the evening , Disp: , Rfl:     gabapentin (NEURONTIN) 600 MG tablet, Take 600 mg by mouth daily at bedtime, Disp: , Rfl:     hydroxyurea (HYDREA) 500 mg capsule, Take 2 capsules (1,000 mg total) by mouth every 12 (twelve) hours, Disp: , Rfl: 0    insulin glargine (LANTUS) 100 units/mL subcutaneous injection, Inject 20 Units under the skin daily at bedtime, Disp: 10 mL, Rfl: 0    insulin lispro (HumaLOG) 100 units/mL injection, Inject 5 Units under the skin 3 (three) times a day with meals, Disp: , Rfl: 0    lisinopril (ZESTRIL) 5 mg tablet, Take 1 tablet (5 mg total) by mouth daily, Disp: , Rfl: 0    loratadine (CLARITIN) 10 mg tablet, Take 10 mg by mouth daily, Disp: , Rfl:     metoprolol succinate (TOPROL-XL) 25 mg 24 hr tablet, Take 1 tablet (25 mg total) by mouth daily, Disp: , Rfl: 0    pantoprazole (PROTONIX) 40 mg tablet, Take 40 mg by mouth daily, Disp: , Rfl:     potassium chloride (K-DUR,KLOR-CON) 20 mEq tablet, Take 2 tablets (40 mEq total) by mouth daily, Disp: , Rfl: 0    rOPINIRole (REQUIP) 0 5 mg tablet, Take 0 5 mg by mouth daily at bedtime, Disp: , Rfl:     tamsulosin (FLOMAX) 0 4 mg, Take 0 4 mg by mouth daily with dinner, Disp: , Rfl: Social History     Socioeconomic History    Marital status: /Civil Union     Spouse name: Not on file    Number of children: Not on file    Years of education: Not on file    Highest education level: Not on file   Occupational History    Not on file   Tobacco Use    Smoking status: Never Smoker    Smokeless tobacco: Never Used   Vaping Use    Vaping Use: Never used   Substance and Sexual Activity    Alcohol use: Not Currently    Drug use: Never    Sexual activity: Not on file   Other Topics Concern    Not on file   Social History Narrative    Not on file     Social Determinants of Health     Financial Resource Strain: Not on file   Food Insecurity: No Food Insecurity    Worried About Running Out of Food in the Last Year: Never true    [address] in the Last Year: Never true   Transportation Needs: No Transportation Needs    Lack of Transportation (Medical): No    Lack of Transportation (Non-Medical): No   Physical Activity: Not on file   Stress: Not on file   Social Connections: Not on file   Intimate Partner Violence: Not on file   Housing Stability: Low Risk     Unable to Pay for Housing in the Last Year: No    Number of Places Lived in the Last Year: 1    Unstable Housing in the Last Year: No       History reviewed  No pertinent family history  Physical Exam  Vitals and nursing note reviewed  Constitutional:       General: He is not in acute distress  Appearance: He is ill-appearing  Comments: Chronically ill-appearing  No acute distress  In wheelchair  Appears comfortable   HENT:      Head: Normocephalic and atraumatic  Eyes:      Conjunctiva/sclera: Conjunctivae normal    Cardiovascular:      Rate and Rhythm: Normal rate and regular rhythm  Pulses: Intact distal pulses  Heart sounds: Normal heart sounds  Pulmonary:      Effort: Pulmonary effort is normal       Breath sounds: Normal breath sounds     Abdominal:      General: Bowel sounds are normal       Palpations: Abdomen is soft  Musculoskeletal:         General: Normal range of motion  Cervical back: Normal range of motion and neck supple  Skin:     General: Skin is warm and dry  Neurological:      Mental Status: He is alert and oriented to person, place, and time  Vitals: Blood pressure 112/60, pulse 72, height 5' 10" (1 778 m), SpO2 97 %  Wt Readings from Last 3 Encounters:   05/26/22 89 3 kg (196 lb 13 9 oz)   02/25/22 94 4 kg (208 lb 3 2 oz)   11/29/13 93 4 kg (206 lb)         Labs & Results:  Lab Results   Component Value Date    WBC 11 02 (H) 05/26/2022    HGB 12 2 05/26/2022    HCT 39 6 05/26/2022    MCV 86 05/26/2022    PLT 1,161 ([facility]) 05/26/2022     No results found for: BNP  No components found for: CHEM  No results found for: CKTOTAL, TROPONINI, TROPONINT, CKMBINDEX  No results found for this or any previous visit  No results found for this or any previous visit  This note was completed in part utilizing [employer] direct voice recognition software  Grammatical errors, random word insertion, spelling mistakes, and incomplete sentences may be an occasional consequence of the system secondary to software limitations, ambient noise and hardware issues  At the time of dictation, efforts were made to edit, clarify and /or correct errors  Please read the chart carefully and recognize, using context, where substitutions have occurred    If you have any questions or concerns about the context, text or information contained within the body of this dictation, please contact myself, the provider, for further clarification

## 2022-06-21 ENCOUNTER — OFFICE VISIT (OUTPATIENT)
Dept: CARDIOLOGY CLINIC | Facility: CLINIC | Age: 72
End: 2022-06-21
Payer: MEDICARE

## 2022-06-21 VITALS
HEIGHT: 70 IN | SYSTOLIC BLOOD PRESSURE: 112 MMHG | OXYGEN SATURATION: 97 % | HEART RATE: 72 BPM | BODY MASS INDEX: 28.25 KG/M2 | DIASTOLIC BLOOD PRESSURE: 60 MMHG

## 2022-06-21 DIAGNOSIS — Z09 HOSPITAL DISCHARGE FOLLOW-UP: Primary | ICD-10-CM

## 2022-06-21 DIAGNOSIS — I10 PRIMARY HYPERTENSION: ICD-10-CM

## 2022-06-21 DIAGNOSIS — I50.32 CHRONIC HEART FAILURE WITH PRESERVED EJECTION FRACTION (HCC): ICD-10-CM

## 2022-06-21 DIAGNOSIS — I25.10 CORONARY ARTERY DISEASE INVOLVING NATIVE CORONARY ARTERY OF NATIVE HEART WITHOUT ANGINA PECTORIS: ICD-10-CM

## 2022-06-21 DIAGNOSIS — I25.5 ISCHEMIC CARDIOMYOPATHY: ICD-10-CM

## 2022-06-21 DIAGNOSIS — R93.1 ABNORMAL ECHOCARDIOGRAM: ICD-10-CM

## 2022-06-21 DIAGNOSIS — E78.00 PURE HYPERCHOLESTEROLEMIA: ICD-10-CM

## 2022-06-21 DIAGNOSIS — N18.30 STAGE 3 CHRONIC KIDNEY DISEASE, UNSPECIFIED WHETHER STAGE 3A OR 3B CKD (HCC): ICD-10-CM

## 2022-06-21 DIAGNOSIS — I73.9 PVD (PERIPHERAL VASCULAR DISEASE) (HCC): ICD-10-CM

## 2022-06-21 PROCEDURE — 99214 OFFICE O/P EST MOD 30 MIN: CPT | Performed by: NURSE PRACTITIONER

## 2022-06-21 NOTE — PATIENT INSTRUCTIONS

## 2022-06-21 NOTE — LETTER
June 21, 2022     Sharon Angel MD  [address] [facility]  [address]  [address] [place] [address]    Patient: Pily Monterroso   YOB: 1950   Date of Visit: 6/21/2022       Dear Dr Janine Go:    Thank you for referring Xuan Smith to me for evaluation  Below are my notes for this consultation  If you have questions, please do not hesitate to call me  I look forward to following your patient along with you  Sincerely,        CATARINO Mina        CC: No Recipients  CATARINO Mina  6/21/2022  2:27 PM  Sign when Signing Visit  Cardiology  Follow Up   Office Visit Note  Pily Monterroso   70 y o    male   MRN: [id number]  [address] [id number] [phone]    PCP: Sharon Angel MD  Cardiologist:  Will be Dr Gopal Rod        resides at [facility]          Summary of recommendations  Low-sodium diet  Educational information provided  Regular weights  He is currently being weighed every Tuesday   If he has intercurrent weight gain he may benefit from up titration of his diuretic  He should have a follow-up nonfasting BMP given his new medications  His CBC is being followed monthly per Hematology  Repeat fasting lipid profile, 2 months  Follow up will be scheduled with Dr Gopal Rod 2 months        Assessment/plan  Respiratory failure, recent hospital admission  5/22-5/26/22  CAD, recorded on the facility chart  Not clear what basis this diagnosis was made  Mild cardiomyopathy, EF 45% with wall motion abnormalities-segments are akinetic: Apical anterior and apex  Following segments are hypokinetic: Mid anterior  Medical management  -on aspirin, Plavix (DA PT given thrombocytosis per Hematology), statin, beta-blocker, ACE-inhibitor, diuretic  Elevated troponin, likely non myocardial injury secondary to acute heart failure, accelerated hypertension  · Patient desires a conservative approach    Declines left heart catheterization  Chronic heart failure preserved ejection fraction  Wt Readings from Last 3 Encounters:   05/26/22 89 3 kg (196 lb 13 9 oz)   02/25/22 94 4 kg (208 lb 3 2 oz)   11/29/13 93 4 kg (206 lb)     -beta-blocker:   Toprol 25 mg daily  --Diuretic:   Lasix 40 mg daily  --ACE/ARB/ARNI:  lisinopril 5 mg daily    --MRA:   --2 g sodium diet, 1800 cc fluid restriction  Daily weights, call weight gain 2-3 lb in 1 day or 5 lb in 5 days  Hypertension, essential   /60  Hyperlipidemia, recently started on atorvastatin 40 mg daily  5/24/22   Type 2 DM  Hemoglobin A1c 5 3  Thrombocytosis,  On aspirin /Plavix chronically chronically     Hematology added hydroxyurea  Last platelet count 6839 K  Multiple sclerosis  Cardiac testing   TTE 5/23/22  EF 45%  Grade 2 diastolic dysfunction  Following segments are akinetic: Apical anterior and apex  Following segments are hypokinetic: Mid anterior  Mild TR            HPI  Tariq Lr is a 71 yo male with CAD, essential hypertension, diabetes mellitus  He has thrombocytosis, history of multiple sclerosis  He resides at [facility]  In the past he saw [facility] Cardiology  He was diagnosed with atrial tachycardia  The basis for his diagnosis of CAD noted on chart information at the facility, is not clear  Adm 5/22-5/26/22  CC: Worsening shortness of breath  Notes the facility reports he has been on oxygen intermittently over last few weeks  He has also been ordered morphine p r n  for dyspnea  BP on admission 198/104  Found to be in acute decompensated heart failure   Chest x-ray showed pulmonary edema  He required treatment with BiPAP on admission due to increased work of breathing  EKG:NSR with PACs, Inferior infarct, anteroseptal infarct  Nonspecific ST T wave abnormality  Echo:  EF 45%  Wall motion abnormalities    hsTn 448->663->1,090->1,439  Followed by Cardiology   Diuresed  Troponin elevation likely nonischemic myocardial injury secondary decompensated heart failure and underlying chronic coronary disease  Patient requested a conservative approach  Medical management pursued  Started on Toprol, lisinopril and Lipitor; previously on Plavix as an outpatient, which was continued    6/21/22  Hospital follow-up  ROS: He has no complaints  /60  He is in a wheelchair  He remains on guideline directed medical therapy for his CAD/ ICM: Aspirin, Plavix, statin, beta-blocker, ACE-inhibitor  He is on Lasix 40 mg daily  Will continue the current plan recommend follow-up BMP, follow-up lipids        I have spent 25 minutes with Patient  today in which greater than 50% of this time was spent in counseling/coordination of care regarding Intructions for management, Patient and family education, Importance of tx compliance and Risk factor reductions  Assessment  Diagnoses and all orders for this visit:    Hospital discharge follow-up    Ischemic cardiomyopathy  -     Basic metabolic panel; Future    Chronic heart failure with preserved ejection fraction (HCC)    Coronary artery disease involving native coronary artery of native heart without angina pectoris    Abnormal echocardiogram    Primary hypertension    Pure hypercholesterolemia  -     Lipid panel; Future    PVD (peripheral vascular disease) ([facility])    Stage 3 chronic kidney disease, unspecified whether stage 3a or 3b CKD ([facility] [address] )          Past Medical History:   Diagnosis Date    Atrial fibrillation (HCC)     BPH (benign prostatic hyperplasia)     Congestive heart failure (CHF) (HCC)     Diabetes mellitus (HCC)     Hyperlipidemia     Hypertension     Multiple sclerosis (HCC)     Neuropathy     RLS (restless legs syndrome)        Review of Systems   Constitutional: Positive for malaise/fatigue  Negative for chills     Cardiovascular: Negative for chest pain, claudication, cyanosis, dyspnea on exertion, irregular heartbeat, leg swelling, near-syncope, orthopnea, palpitations, paroxysmal nocturnal dyspnea and syncope  Respiratory: Negative for cough and shortness of breath  Musculoskeletal:        Gait dysfunction  Requires transfer to  Bed-bound  Today, in a  wheelchair   Gastrointestinal: Negative for heartburn and nausea  Neurological: Negative for dizziness, focal weakness, headaches, light-headedness and weakness  All other systems reviewed and are negative  Allergies   Allergen Reactions    Metformin GI Intolerance           Current Outpatient Medications:     acetaminophen (TYLENOL) 325 mg tablet, Take 650 mg by mouth every 6 (six) hours as needed for mild pain, Disp: , Rfl:     aspirin (ECOTRIN LOW STRENGTH) 81 mg EC tablet, Take 81 mg by mouth in the morning , Disp: , Rfl:     atorvastatin (LIPITOR) 40 mg tablet, Take 1 tablet (40 mg total) by mouth daily with dinner, Disp: , Rfl: 0    baclofen 10 mg tablet, Take 10 mg by mouth every 6 (six) hours as needed for muscle spasms, Disp: , Rfl:     cholestyramine sugar free (QUESTRAN LIGHT) 4 g packet, Take 4 g by mouth 2 (two) times a day, Disp: , Rfl:     clopidogrel (PLAVIX) 75 mg tablet, Take 75 mg by mouth daily, Disp: , Rfl:     furosemide (LASIX) 40 mg tablet, Take 1 tablet (40 mg total) by mouth daily, Disp: , Rfl: 0    gabapentin (NEURONTIN) 300 mg capsule, Take 300 mg by mouth in the morning and 300 mg in the evening , Disp: , Rfl:     gabapentin (NEURONTIN) 600 MG tablet, Take 600 mg by mouth daily at bedtime, Disp: , Rfl:     hydroxyurea (HYDREA) 500 mg capsule, Take 2 capsules (1,000 mg total) by mouth every 12 (twelve) hours, Disp: , Rfl: 0    insulin glargine (LANTUS) 100 units/mL subcutaneous injection, Inject 20 Units under the skin daily at bedtime, Disp: 10 mL, Rfl: 0    insulin lispro (HumaLOG) 100 units/mL injection, Inject 5 Units under the skin 3 (three) times a day with meals, Disp: , Rfl: 0    lisinopril (ZESTRIL) 5 mg tablet, Take 1 tablet (5 mg total) by mouth daily, Disp: , Rfl: 0    loratadine (CLARITIN) 10 mg tablet, Take 10 mg by mouth daily, Disp: , Rfl:     metoprolol succinate (TOPROL-XL) 25 mg 24 hr tablet, Take 1 tablet (25 mg total) by mouth daily, Disp: , Rfl: 0    pantoprazole (PROTONIX) 40 mg tablet, Take 40 mg by mouth daily, Disp: , Rfl:     potassium chloride (K-DUR,KLOR-CON) 20 mEq tablet, Take 2 tablets (40 mEq total) by mouth daily, Disp: , Rfl: 0    rOPINIRole (REQUIP) 0 5 mg tablet, Take 0 5 mg by mouth daily at bedtime, Disp: , Rfl:     tamsulosin (FLOMAX) 0 4 mg, Take 0 4 mg by mouth daily with dinner, Disp: , Rfl:         Social History     Socioeconomic History    Marital status: /Civil Union     Spouse name: Not on file    Number of children: Not on file    Years of education: Not on file    Highest education level: Not on file   Occupational History    Not on file   Tobacco Use    Smoking status: Never Smoker    Smokeless tobacco: Never Used   Vaping Use    Vaping Use: Never used   Substance and Sexual Activity    Alcohol use: Not Currently    Drug use: Never    Sexual activity: Not on file   Other Topics Concern    Not on file   Social History Narrative    Not on file     Social Determinants of Health     Financial Resource Strain: Not on file   Food Insecurity: No Food Insecurity    Worried About Running Out of Food in the Last Year: Never true    Juanpablo of Food in the Last Year: Never true   Transportation Needs: No Transportation Needs    Lack of Transportation (Medical): No    Lack of Transportation (Non-Medical): No   Physical Activity: Not on file   Stress: Not on file   Social Connections: Not on file   Intimate Partner Violence: Not on file   Housing Stability: Low Risk     Unable to Pay for Housing in the Last Year: No    Number of Places Lived in the Last Year: 1    Unstable Housing in the Last Year: No       History reviewed  No pertinent family history  Physical Exam  Vitals and nursing note reviewed  Constitutional:       General: He is not in acute distress  Appearance: He is ill-appearing  Comments: Chronically ill-appearing  No acute distress  In wheelchair  Appears comfortable   HENT:      Head: Normocephalic and atraumatic  Eyes:      Conjunctiva/sclera: Conjunctivae normal    Cardiovascular:      Rate and Rhythm: Normal rate and regular rhythm  Pulses: Intact distal pulses  Heart sounds: Normal heart sounds  Pulmonary:      Effort: Pulmonary effort is normal       Breath sounds: Normal breath sounds  Abdominal:      General: Bowel sounds are normal       Palpations: Abdomen is soft  Musculoskeletal:         General: Normal range of motion  Cervical back: Normal range of motion and neck supple  Skin:     General: Skin is warm and dry  Neurological:      Mental Status: He is alert and oriented to person, place, and time  Vitals: Blood pressure 112/60, pulse 72, height 5' 10" (1 778 m), SpO2 97 %  Wt Readings from Last 3 Encounters:   05/26/22 89 3 kg (196 lb 13 9 oz)   02/25/22 94 4 kg (208 lb 3 2 oz)   11/29/13 93 4 kg (206 lb)         Labs & Results:  Lab Results   Component Value Date    WBC 11 02 (H) 05/26/2022    HGB 12 2 05/26/2022    HCT 39 6 05/26/2022    MCV 86 05/26/2022    PLT 1,161 ([facility]) 05/26/2022     No results found for: BNP  No components found for: CHEM  No results found for: CKTOTAL, TROPONINI, TROPONINT, CKMBINDEX  No results found for this or any previous visit  No results found for this or any previous visit  This note was completed in part utilizing [employer] direct voice recognition software  Grammatical errors, random word insertion, spelling mistakes, and incomplete sentences may be an occasional consequence of the system secondary to software limitations, ambient noise and hardware issues  At the time of dictation, efforts were made to edit, clarify and /or correct errors    Please read the chart carefully and recognize, using context, where substitutions have occurred    If you have any questions or concerns about the context, text or information contained within the body of this dictation, please contact myself, the provider, for further clarification

## 2022-06-28 ENCOUNTER — OFFICE VISIT (OUTPATIENT)
Dept: GASTROENTEROLOGY | Facility: CLINIC | Age: 72
End: 2022-06-28
Payer: MEDICARE

## 2022-06-28 VITALS — DIASTOLIC BLOOD PRESSURE: 60 MMHG | SYSTOLIC BLOOD PRESSURE: 110 MMHG

## 2022-06-28 DIAGNOSIS — R19.7 DIARRHEA, UNSPECIFIED TYPE: Primary | ICD-10-CM

## 2022-06-28 PROCEDURE — 99213 OFFICE O/P EST LOW 20 MIN: CPT | Performed by: NURSE PRACTITIONER

## 2022-06-28 NOTE — PROGRESS NOTES
[facility] - Outpatient Follow-up Note  Lacey Lo 70 y o  male MRN: [id number]  Encounter: [id number]    ASSESSMENT AND PLAN:      1  Diarrhea, unspecified type  Bowel movements formed and soft on cholestyramine b i d  however patient has recurrent watery stools with urgency and incontinence off cholestyramine  Recent extensive workup including stool cultures, colonoscopy and breath test for SIBO were all negative   Does not like medication mixed with water but more palatable when mixed with applesauce  Offered Colestid tablets to patient  - he refused as he takes multiple medications daily  - continue cholestyramine b i d   Okay to mix in applesauce for administration  - will discuss recent pathology findings with MD   Consider brief trial of budesonide for local mild inflammation    2  Screening for colon cancer  Colonoscopy 3/22 was negative  No recall recommended due to advanced age and disability    3  Multiple sclerosis  Minimal ability to ambulate, currently wheelchair-bound    Followup Appointment:  6 months  ______________________________________________________________________    Chief Complaint   Patient presents with    Diarrhea     Follow up  Does not like Questran  HPI:  Patient presents in follow-up for persistent diarrhea  He is currently taking Questran b i d  which he reports has resolved his diarrhea and he has formed bowel movements 1 to 2 times a day  He does experience liquid uncontrollable stools if he stops Questran  He does not like medication mixed with water but is easier to take if mixed in applesauce  Denies recent abdominal pain or cramping  No melena or rectal bleeding    Recent stool cultures were negative for C diff and enteric bacterial panel  He underwent colonoscopy  3/22 which was normal   Biopsies showed focal mild inflammation, few acute inflammatory cells but findings nonspecific     breath test was negative for SIBO    Historical Information   Past Medical History:   Diagnosis Date    Atrial fibrillation (HCC)     BPH (benign prostatic hyperplasia)     Congestive heart failure (CHF) (HCC)     Diabetes mellitus (HCC)     Hyperlipidemia     Hypertension     Multiple sclerosis (HCC)     Neuropathy     RLS (restless legs syndrome)      History reviewed  No pertinent surgical history  Social History     Substance and Sexual Activity   Alcohol Use Not Currently     Social History     Substance and Sexual Activity   Drug Use Never     Social History     Tobacco Use   Smoking Status Never Smoker   Smokeless Tobacco Never Used     Family History   Problem Relation Age of Onset    Colon cancer Neg Hx     Colon polyps Neg Hx          Current Outpatient Medications:     acetaminophen (TYLENOL) 325 mg tablet    aspirin (ECOTRIN LOW STRENGTH) 81 mg EC tablet    atorvastatin (LIPITOR) 40 mg tablet    baclofen 10 mg tablet    cholestyramine sugar free (QUESTRAN LIGHT) 4 g packet    clopidogrel (PLAVIX) 75 mg tablet    furosemide (LASIX) 40 mg tablet    gabapentin (NEURONTIN) 300 mg capsule    gabapentin (NEURONTIN) 600 MG tablet    hydroxyurea (HYDREA) 500 mg capsule    insulin glargine (LANTUS) 100 units/mL subcutaneous injection    insulin lispro (HumaLOG) 100 units/mL injection    lisinopril (ZESTRIL) 5 mg tablet    loratadine (CLARITIN) 10 mg tablet    metoprolol succinate (TOPROL-XL) 25 mg 24 hr tablet    pantoprazole (PROTONIX) 40 mg tablet    potassium chloride (K-DUR,KLOR-CON) 20 mEq tablet    rOPINIRole (REQUIP) 0 5 mg tablet    tamsulosin (FLOMAX) 0 4 mg  Allergies   Allergen Reactions    Metformin GI Intolerance     Reviewed medications and allergies and updated as indicated    PHYSICAL EXAM:    Blood pressure 110/60  There is no height or weight on file to calculate BMI  General Appearance: NAD, cooperative, alert  Eyes: Anicteric  ENT:  Normocephalic, atraumatic, normal mucosa      Neck: Supple, symmetrical, trachea midline  Resp:  Clear to auscultation bilaterally; no rales, rhonchi or wheezing; respirations unlabored   CV:  S1 S2, Regular rate and rhythm; no murmur, rub, or gallop  GI:  Soft, non-tender, non-distended; normal bowel sounds; no masses, no organomegaly   Rectal: Deferred  Musculoskeletal: No cyanosis, clubbing or edema  Decreased range of motion due to multiple sclerosis    In wheelchair for visit  Skin:  No jaundice, rashes, or lesions   Psych: Normal affect, good eye contact  Neuro: No gross deficits, AAOx3    Lab Results:   Lab Results   Component Value Date    WBC 11 02 (H) 05/26/2022    HGB 12 2 05/26/2022    HCT 39 6 05/26/2022    MCV 86 05/26/2022    PLT 1,161 (HH) 05/26/2022     Lab Results   Component Value Date    K 3 9 05/26/2022     05/26/2022    CO2 27 05/26/2022    BUN 32 (H) 05/26/2022    CREATININE 1 37 (H) 05/26/2022    GLUCOSE 249 (H) 05/22/2022    CALCIUM 9 3 05/26/2022    CORRECTEDCA 9 8 05/26/2022    AST 36 05/26/2022    ALT 18 05/26/2022    ALKPHOS 110 05/26/2022    EGFR 51 05/26/2022

## 2022-07-01 ENCOUNTER — TELEPHONE (OUTPATIENT)
Dept: HEMATOLOGY ONCOLOGY | Facility: HOSPITAL | Age: 72
End: 2022-07-01

## 2022-07-01 DIAGNOSIS — D75.839 THROMBOCYTOSIS: ICD-10-CM

## 2022-07-01 RX ORDER — HYDROXYUREA 500 MG/1
500 CAPSULE ORAL 2 TIMES DAILY
Refills: 0
Start: 2022-07-01 | End: 2022-07-08

## 2022-07-01 NOTE — TELEPHONE ENCOUNTER
Returned call to pt GILBERTO Encarnacion and relayed information to hold hydrea for 1 week then restart 500mg BID and check cbc every 2 weeks thereafter  Verbalized good understanding and has restarted aspirin and plavix

## 2022-07-01 NOTE — TELEPHONE ENCOUNTER
----- Message from Baldo Martines DO sent at 7/1/2022  6:30 AM EDT -----  I saw that we finally got the labs from [facility]  He is now pancytopenic  Please call and ask them to hold hydrea for 1 week  Then restart at 500 mg BID  Continue to check cbc every 2 weeks  Thanks!

## 2022-07-04 ENCOUNTER — APPOINTMENT (EMERGENCY)
Dept: RADIOLOGY | Facility: HOSPITAL | Age: 72
DRG: 808 | End: 2022-07-04
Payer: MEDICARE

## 2022-07-04 ENCOUNTER — HOSPITAL ENCOUNTER (INPATIENT)
Facility: HOSPITAL | Age: 72
LOS: 4 days | Discharge: NURSING, REHAB OR LONG-TERM CARE | DRG: 808 | End: 2022-07-08
Attending: EMERGENCY MEDICINE | Admitting: STUDENT IN AN ORGANIZED HEALTH CARE EDUCATION/TRAINING PROGRAM
Payer: MEDICARE

## 2022-07-04 DIAGNOSIS — I21.4 NSTEMI (NON-ST ELEVATED MYOCARDIAL INFARCTION) (HCC): Primary | ICD-10-CM

## 2022-07-04 DIAGNOSIS — D61.818 PANCYTOPENIA (HCC): ICD-10-CM

## 2022-07-04 DIAGNOSIS — I50.9 ACUTE CHF (CONGESTIVE HEART FAILURE) (HCC): ICD-10-CM

## 2022-07-04 DIAGNOSIS — I47.1 ATRIAL TACHYCARDIA (HCC): ICD-10-CM

## 2022-07-04 PROBLEM — I50.43 ACUTE ON CHRONIC COMBINED SYSTOLIC AND DIASTOLIC CONGESTIVE HEART FAILURE (HCC): Status: ACTIVE | Noted: 2022-06-20

## 2022-07-04 PROBLEM — E87.20 LACTIC ACIDOSIS: Status: ACTIVE | Noted: 2022-07-04

## 2022-07-04 PROBLEM — E87.2 LACTIC ACIDOSIS: Status: ACTIVE | Noted: 2022-07-04

## 2022-07-04 PROBLEM — E83.42 HYPOMAGNESEMIA: Status: ACTIVE | Noted: 2022-07-04

## 2022-07-04 PROBLEM — I50.40 COMBINED CONGESTIVE SYSTOLIC AND DIASTOLIC HEART FAILURE (HCC): Status: ACTIVE | Noted: 2022-06-20

## 2022-07-04 LAB
2HR DELTA HS TROPONIN: 579 NG/L
4HR DELTA HS TROPONIN: 2344 NG/L
ABO GROUP BLD: NORMAL
ABO GROUP BLD: NORMAL
ALBUMIN SERPL BCP-MCNC: 3.7 G/DL (ref 3.5–5)
ALP SERPL-CCNC: 119 U/L (ref 46–116)
ALT SERPL W P-5'-P-CCNC: 39 U/L (ref 12–78)
ANION GAP SERPL CALCULATED.3IONS-SCNC: 12 MMOL/L (ref 4–13)
ANISOCYTOSIS BLD QL SMEAR: PRESENT
APTT PPP: 32 SECONDS (ref 23–37)
AST SERPL W P-5'-P-CCNC: 54 U/L (ref 5–45)
ATRIAL RATE: 102 BPM
ATRIAL RATE: 113 BPM
BASOPHILS # BLD MANUAL: 0 THOUSAND/UL (ref 0–0.1)
BASOPHILS NFR MAR MANUAL: 0 % (ref 0–1)
BILIRUB SERPL-MCNC: 1.3 MG/DL (ref 0.2–1)
BILIRUB UR QL STRIP: NEGATIVE
BLD GP AB SCN SERPL QL: NEGATIVE
BUN SERPL-MCNC: 29 MG/DL (ref 5–25)
CALCIUM SERPL-MCNC: 9 MG/DL (ref 8.3–10.1)
CARDIAC TROPONIN I PNL SERPL HS: 2212 NG/L
CARDIAC TROPONIN I PNL SERPL HS: 2791 NG/L
CARDIAC TROPONIN I PNL SERPL HS: 4556 NG/L
CARDIAC TROPONIN I PNL SERPL HS: 5903 NG/L
CHLORIDE SERPL-SCNC: 103 MMOL/L (ref 100–108)
CLARITY UR: CLEAR
CO2 SERPL-SCNC: 22 MMOL/L (ref 21–32)
COLOR UR: YELLOW
CREAT SERPL-MCNC: 1.2 MG/DL (ref 0.6–1.3)
D DIMER PPP FEU-MCNC: 0.68 UG/ML FEU
DACRYOCYTES BLD QL SMEAR: PRESENT
EOSINOPHIL # BLD MANUAL: 0.03 THOUSAND/UL (ref 0–0.4)
EOSINOPHIL NFR BLD MANUAL: 2 % (ref 0–6)
ERYTHROCYTE [DISTWIDTH] IN BLOOD BY AUTOMATED COUNT: 19.7 % (ref 11.6–15.1)
FLUAV RNA RESP QL NAA+PROBE: NEGATIVE
FLUBV RNA RESP QL NAA+PROBE: NEGATIVE
GFR SERPL CREATININE-BSD FRML MDRD: 60 ML/MIN/1.73SQ M
GLUCOSE SERPL-MCNC: 199 MG/DL (ref 65–140)
GLUCOSE SERPL-MCNC: 205 MG/DL (ref 65–140)
GLUCOSE SERPL-MCNC: 214 MG/DL (ref 65–140)
GLUCOSE SERPL-MCNC: 280 MG/DL (ref 65–140)
GLUCOSE SERPL-MCNC: 309 MG/DL (ref 65–140)
GLUCOSE UR STRIP-MCNC: NEGATIVE MG/DL
HCT VFR BLD AUTO: 21.8 % (ref 36.5–49.3)
HCT VFR BLD AUTO: 23.4 % (ref 36.5–49.3)
HCT VFR BLD AUTO: 25.1 % (ref 36.5–49.3)
HGB BLD-MCNC: 7.2 G/DL (ref 12–17)
HGB BLD-MCNC: 7.6 G/DL (ref 12–17)
HGB BLD-MCNC: 8.4 G/DL (ref 12–17)
HGB UR QL STRIP.AUTO: NEGATIVE
HYPERCHROMIA BLD QL SMEAR: PRESENT
INR PPP: 1.09 (ref 0.84–1.19)
KETONES UR STRIP-MCNC: NEGATIVE MG/DL
LACTATE SERPL-SCNC: 2.1 MMOL/L (ref 0.5–2)
LACTATE SERPL-SCNC: 2.7 MMOL/L (ref 0.5–2)
LACTATE SERPL-SCNC: 3.1 MMOL/L (ref 0.5–2)
LACTATE SERPL-SCNC: 3.1 MMOL/L (ref 0.5–2)
LEUKOCYTE ESTERASE UR QL STRIP: NEGATIVE
LIPASE SERPL-CCNC: 51 U/L (ref 73–393)
LYMPHOCYTES # BLD AUTO: 0.62 THOUSAND/UL (ref 0.6–4.47)
LYMPHOCYTES # BLD AUTO: 41 % (ref 14–44)
MAGNESIUM SERPL-MCNC: 1.4 MG/DL (ref 1.6–2.6)
MCH RBC QN AUTO: 28.2 PG (ref 26.8–34.3)
MCHC RBC AUTO-ENTMCNC: 33 G/DL (ref 31.4–37.4)
MCV RBC AUTO: 86 FL (ref 82–98)
MONOCYTES # BLD AUTO: 0.02 THOUSAND/UL (ref 0–1.22)
MONOCYTES NFR BLD: 1 % (ref 4–12)
NEUTROPHILS # BLD MANUAL: 0.85 THOUSAND/UL (ref 1.85–7.62)
NEUTS SEG NFR BLD AUTO: 56 % (ref 43–75)
NITRITE UR QL STRIP: NEGATIVE
NT-PROBNP SERPL-MCNC: 1062 PG/ML
P AXIS: 22 DEGREES
P AXIS: 51 DEGREES
PH UR STRIP.AUTO: 5.5 [PH]
PLATELET # BLD AUTO: 45 THOUSANDS/UL (ref 149–390)
PLATELET BLD QL SMEAR: ABNORMAL
PMV BLD AUTO: 10.2 FL (ref 8.9–12.7)
POTASSIUM SERPL-SCNC: 4.2 MMOL/L (ref 3.5–5.3)
PR INTERVAL: 148 MS
PR INTERVAL: 192 MS
PROCALCITONIN SERPL-MCNC: 0.08 NG/ML
PROT SERPL-MCNC: 7.2 G/DL (ref 6.4–8.2)
PROT UR STRIP-MCNC: NEGATIVE MG/DL
PROTHROMBIN TIME: 14 SECONDS (ref 11.6–14.5)
QRS AXIS: -33 DEGREES
QRS AXIS: 30 DEGREES
QRSD INTERVAL: 86 MS
QRSD INTERVAL: 90 MS
QT INTERVAL: 334 MS
QT INTERVAL: 348 MS
QTC INTERVAL: 453 MS
QTC INTERVAL: 458 MS
RBC # BLD AUTO: 2.55 MILLION/UL (ref 3.88–5.62)
RBC MORPH BLD: PRESENT
RH BLD: POSITIVE
RH BLD: POSITIVE
RSV RNA RESP QL NAA+PROBE: NEGATIVE
SARS-COV-2 RNA RESP QL NAA+PROBE: NEGATIVE
SODIUM SERPL-SCNC: 137 MMOL/L (ref 136–145)
SP GR UR STRIP.AUTO: 1.02 (ref 1–1.03)
SPECIMEN EXPIRATION DATE: NORMAL
T WAVE AXIS: 100 DEGREES
T WAVE AXIS: 84 DEGREES
UROBILINOGEN UR QL STRIP.AUTO: 0.2 E.U./DL
VENTRICULAR RATE: 102 BPM
VENTRICULAR RATE: 113 BPM
WBC # BLD AUTO: 1.51 THOUSAND/UL (ref 4.31–10.16)

## 2022-07-04 PROCEDURE — 94664 DEMO&/EVAL PT USE INHALER: CPT

## 2022-07-04 PROCEDURE — 36430 TRANSFUSION BLD/BLD COMPNT: CPT

## 2022-07-04 PROCEDURE — 84145 PROCALCITONIN (PCT): CPT | Performed by: EMERGENCY MEDICINE

## 2022-07-04 PROCEDURE — 85014 HEMATOCRIT: CPT | Performed by: HOSPITALIST

## 2022-07-04 PROCEDURE — 82948 REAGENT STRIP/BLOOD GLUCOSE: CPT

## 2022-07-04 PROCEDURE — 85379 FIBRIN DEGRADATION QUANT: CPT | Performed by: EMERGENCY MEDICINE

## 2022-07-04 PROCEDURE — 83690 ASSAY OF LIPASE: CPT | Performed by: EMERGENCY MEDICINE

## 2022-07-04 PROCEDURE — 30233N1 TRANSFUSION OF NONAUTOLOGOUS RED BLOOD CELLS INTO PERIPHERAL VEIN, PERCUTANEOUS APPROACH: ICD-10-PCS | Performed by: HOSPITALIST

## 2022-07-04 PROCEDURE — 71045 X-RAY EXAM CHEST 1 VIEW: CPT

## 2022-07-04 PROCEDURE — 85027 COMPLETE CBC AUTOMATED: CPT | Performed by: EMERGENCY MEDICINE

## 2022-07-04 PROCEDURE — 0241U HB NFCT DS VIR RESP RNA 4 TRGT: CPT | Performed by: EMERGENCY MEDICINE

## 2022-07-04 PROCEDURE — 85730 THROMBOPLASTIN TIME PARTIAL: CPT | Performed by: EMERGENCY MEDICINE

## 2022-07-04 PROCEDURE — 87040 BLOOD CULTURE FOR BACTERIA: CPT | Performed by: EMERGENCY MEDICINE

## 2022-07-04 PROCEDURE — 86923 COMPATIBILITY TEST ELECTRIC: CPT

## 2022-07-04 PROCEDURE — 83605 ASSAY OF LACTIC ACID: CPT | Performed by: EMERGENCY MEDICINE

## 2022-07-04 PROCEDURE — 99285 EMERGENCY DEPT VISIT HI MDM: CPT | Performed by: EMERGENCY MEDICINE

## 2022-07-04 PROCEDURE — 85018 HEMOGLOBIN: CPT | Performed by: INTERNAL MEDICINE

## 2022-07-04 PROCEDURE — 84484 ASSAY OF TROPONIN QUANT: CPT | Performed by: EMERGENCY MEDICINE

## 2022-07-04 PROCEDURE — 83880 ASSAY OF NATRIURETIC PEPTIDE: CPT | Performed by: EMERGENCY MEDICINE

## 2022-07-04 PROCEDURE — 94760 N-INVAS EAR/PLS OXIMETRY 1: CPT

## 2022-07-04 PROCEDURE — 93010 ELECTROCARDIOGRAM REPORT: CPT | Performed by: INTERNAL MEDICINE

## 2022-07-04 PROCEDURE — 83735 ASSAY OF MAGNESIUM: CPT | Performed by: EMERGENCY MEDICINE

## 2022-07-04 PROCEDURE — 99223 1ST HOSP IP/OBS HIGH 75: CPT | Performed by: HOSPITALIST

## 2022-07-04 PROCEDURE — 83605 ASSAY OF LACTIC ACID: CPT | Performed by: INTERNAL MEDICINE

## 2022-07-04 PROCEDURE — 80053 COMPREHEN METABOLIC PANEL: CPT | Performed by: EMERGENCY MEDICINE

## 2022-07-04 PROCEDURE — 99222 1ST HOSP IP/OBS MODERATE 55: CPT | Performed by: INTERNAL MEDICINE

## 2022-07-04 PROCEDURE — 85007 BL SMEAR W/DIFF WBC COUNT: CPT | Performed by: EMERGENCY MEDICINE

## 2022-07-04 PROCEDURE — P9016 RBC LEUKOCYTES REDUCED: HCPCS

## 2022-07-04 PROCEDURE — 86901 BLOOD TYPING SEROLOGIC RH(D): CPT | Performed by: EMERGENCY MEDICINE

## 2022-07-04 PROCEDURE — 86900 BLOOD TYPING SEROLOGIC ABO: CPT | Performed by: EMERGENCY MEDICINE

## 2022-07-04 PROCEDURE — 84484 ASSAY OF TROPONIN QUANT: CPT | Performed by: INTERNAL MEDICINE

## 2022-07-04 PROCEDURE — 86850 RBC ANTIBODY SCREEN: CPT | Performed by: EMERGENCY MEDICINE

## 2022-07-04 PROCEDURE — 36415 COLL VENOUS BLD VENIPUNCTURE: CPT | Performed by: EMERGENCY MEDICINE

## 2022-07-04 PROCEDURE — 81003 URINALYSIS AUTO W/O SCOPE: CPT | Performed by: EMERGENCY MEDICINE

## 2022-07-04 PROCEDURE — 93005 ELECTROCARDIOGRAM TRACING: CPT

## 2022-07-04 PROCEDURE — 85610 PROTHROMBIN TIME: CPT | Performed by: EMERGENCY MEDICINE

## 2022-07-04 PROCEDURE — 85014 HEMATOCRIT: CPT | Performed by: INTERNAL MEDICINE

## 2022-07-04 PROCEDURE — 85018 HEMOGLOBIN: CPT | Performed by: HOSPITALIST

## 2022-07-04 PROCEDURE — 99285 EMERGENCY DEPT VISIT HI MDM: CPT

## 2022-07-04 RX ORDER — INSULIN LISPRO 100 [IU]/ML
1-6 INJECTION, SOLUTION INTRAVENOUS; SUBCUTANEOUS
Status: DISCONTINUED | OUTPATIENT
Start: 2022-07-04 | End: 2022-07-08 | Stop reason: HOSPADM

## 2022-07-04 RX ORDER — BACLOFEN 10 MG/1
10 TABLET ORAL EVERY 6 HOURS PRN
Status: DISCONTINUED | OUTPATIENT
Start: 2022-07-04 | End: 2022-07-08 | Stop reason: HOSPADM

## 2022-07-04 RX ORDER — ATORVASTATIN CALCIUM 40 MG/1
40 TABLET, FILM COATED ORAL
Status: DISCONTINUED | OUTPATIENT
Start: 2022-07-04 | End: 2022-07-08 | Stop reason: HOSPADM

## 2022-07-04 RX ORDER — GABAPENTIN 300 MG/1
300 CAPSULE ORAL 2 TIMES DAILY
Status: DISCONTINUED | OUTPATIENT
Start: 2022-07-04 | End: 2022-07-08 | Stop reason: HOSPADM

## 2022-07-04 RX ORDER — ONDANSETRON 2 MG/ML
4 INJECTION INTRAMUSCULAR; INTRAVENOUS EVERY 6 HOURS PRN
Status: DISCONTINUED | OUTPATIENT
Start: 2022-07-04 | End: 2022-07-08 | Stop reason: HOSPADM

## 2022-07-04 RX ORDER — FUROSEMIDE 10 MG/ML
40 INJECTION INTRAMUSCULAR; INTRAVENOUS ONCE
Status: COMPLETED | OUTPATIENT
Start: 2022-07-04 | End: 2022-07-04

## 2022-07-04 RX ORDER — LORATADINE 10 MG/1
10 TABLET ORAL DAILY
Status: DISCONTINUED | OUTPATIENT
Start: 2022-07-04 | End: 2022-07-08 | Stop reason: HOSPADM

## 2022-07-04 RX ORDER — SODIUM CHLORIDE 9 MG/ML
3 INJECTION INTRAVENOUS EVERY 8 HOURS SCHEDULED
Status: DISCONTINUED | OUTPATIENT
Start: 2022-07-04 | End: 2022-07-08 | Stop reason: HOSPADM

## 2022-07-04 RX ORDER — INSULIN GLARGINE 100 [IU]/ML
20 INJECTION, SOLUTION SUBCUTANEOUS
Status: DISCONTINUED | OUTPATIENT
Start: 2022-07-04 | End: 2022-07-08 | Stop reason: HOSPADM

## 2022-07-04 RX ORDER — ACETAMINOPHEN 325 MG/1
650 TABLET ORAL EVERY 6 HOURS PRN
Status: DISCONTINUED | OUTPATIENT
Start: 2022-07-04 | End: 2022-07-08 | Stop reason: HOSPADM

## 2022-07-04 RX ORDER — LISINOPRIL 5 MG/1
5 TABLET ORAL DAILY
Status: DISCONTINUED | OUTPATIENT
Start: 2022-07-04 | End: 2022-07-06

## 2022-07-04 RX ORDER — FUROSEMIDE 10 MG/ML
20 INJECTION INTRAMUSCULAR; INTRAVENOUS
Status: DISCONTINUED | OUTPATIENT
Start: 2022-07-04 | End: 2022-07-04

## 2022-07-04 RX ORDER — MAGNESIUM SULFATE HEPTAHYDRATE 40 MG/ML
2 INJECTION, SOLUTION INTRAVENOUS ONCE
Status: COMPLETED | OUTPATIENT
Start: 2022-07-04 | End: 2022-07-04

## 2022-07-04 RX ORDER — INSULIN LISPRO 100 [IU]/ML
5 INJECTION, SOLUTION INTRAVENOUS; SUBCUTANEOUS
Status: DISCONTINUED | OUTPATIENT
Start: 2022-07-04 | End: 2022-07-08 | Stop reason: HOSPADM

## 2022-07-04 RX ORDER — PANTOPRAZOLE SODIUM 40 MG/1
40 TABLET, DELAYED RELEASE ORAL
Status: DISCONTINUED | OUTPATIENT
Start: 2022-07-04 | End: 2022-07-08 | Stop reason: HOSPADM

## 2022-07-04 RX ORDER — ASPIRIN 81 MG/1
81 TABLET ORAL DAILY
Status: DISCONTINUED | OUTPATIENT
Start: 2022-07-04 | End: 2022-07-08 | Stop reason: HOSPADM

## 2022-07-04 RX ORDER — TAMSULOSIN HYDROCHLORIDE 0.4 MG/1
0.4 CAPSULE ORAL
Status: DISCONTINUED | OUTPATIENT
Start: 2022-07-04 | End: 2022-07-08 | Stop reason: HOSPADM

## 2022-07-04 RX ORDER — GABAPENTIN 300 MG/1
600 CAPSULE ORAL
Status: DISCONTINUED | OUTPATIENT
Start: 2022-07-04 | End: 2022-07-08 | Stop reason: HOSPADM

## 2022-07-04 RX ORDER — ROPINIROLE 1 MG/1
0.5 TABLET, FILM COATED ORAL
Status: DISCONTINUED | OUTPATIENT
Start: 2022-07-04 | End: 2022-07-08 | Stop reason: HOSPADM

## 2022-07-04 RX ORDER — FUROSEMIDE 10 MG/ML
40 INJECTION INTRAMUSCULAR; INTRAVENOUS
Status: COMPLETED | OUTPATIENT
Start: 2022-07-04 | End: 2022-07-05

## 2022-07-04 RX ORDER — METOPROLOL SUCCINATE 25 MG/1
25 TABLET, EXTENDED RELEASE ORAL DAILY
Status: DISCONTINUED | OUTPATIENT
Start: 2022-07-04 | End: 2022-07-07

## 2022-07-04 RX ORDER — POTASSIUM CHLORIDE 20 MEQ/1
40 TABLET, EXTENDED RELEASE ORAL DAILY
Status: DISCONTINUED | OUTPATIENT
Start: 2022-07-04 | End: 2022-07-08 | Stop reason: HOSPADM

## 2022-07-04 RX ORDER — ACETAMINOPHEN 325 MG/1
975 TABLET ORAL ONCE
Status: COMPLETED | OUTPATIENT
Start: 2022-07-04 | End: 2022-07-04

## 2022-07-04 RX ORDER — CLOPIDOGREL BISULFATE 75 MG/1
75 TABLET ORAL DAILY
Status: DISCONTINUED | OUTPATIENT
Start: 2022-07-04 | End: 2022-07-04

## 2022-07-04 RX ORDER — CHOLESTYRAMINE LIGHT 4 G/5.7G
4 POWDER, FOR SUSPENSION ORAL 2 TIMES DAILY
Status: DISCONTINUED | OUTPATIENT
Start: 2022-07-04 | End: 2022-07-08 | Stop reason: HOSPADM

## 2022-07-04 RX ADMIN — GABAPENTIN 300 MG: 300 CAPSULE ORAL at 17:12

## 2022-07-04 RX ADMIN — INSULIN LISPRO 5 UNITS: 100 INJECTION, SOLUTION INTRAVENOUS; SUBCUTANEOUS at 07:32

## 2022-07-04 RX ADMIN — TAMSULOSIN HYDROCHLORIDE 0.4 MG: 0.4 CAPSULE ORAL at 16:07

## 2022-07-04 RX ADMIN — CHOLESTYRAMINE 4 G: 4 POWDER, FOR SUSPENSION ORAL at 17:12

## 2022-07-04 RX ADMIN — LORATADINE 10 MG: 10 TABLET ORAL at 08:30

## 2022-07-04 RX ADMIN — FUROSEMIDE 40 MG: 10 INJECTION, SOLUTION INTRAMUSCULAR; INTRAVENOUS at 04:00

## 2022-07-04 RX ADMIN — GABAPENTIN 600 MG: 300 CAPSULE ORAL at 21:33

## 2022-07-04 RX ADMIN — BACLOFEN 10 MG: 10 TABLET ORAL at 21:34

## 2022-07-04 RX ADMIN — CHOLESTYRAMINE 4 G: 4 POWDER, FOR SUSPENSION ORAL at 08:30

## 2022-07-04 RX ADMIN — SODIUM CHLORIDE 3 ML: 9 INJECTION, SOLUTION INTRAMUSCULAR; INTRAVENOUS; SUBCUTANEOUS at 04:36

## 2022-07-04 RX ADMIN — INSULIN GLARGINE 20 UNITS: 100 INJECTION, SOLUTION SUBCUTANEOUS at 21:31

## 2022-07-04 RX ADMIN — PANTOPRAZOLE SODIUM 40 MG: 40 TABLET, DELAYED RELEASE ORAL at 07:28

## 2022-07-04 RX ADMIN — ATORVASTATIN CALCIUM 40 MG: 40 TABLET, FILM COATED ORAL at 16:07

## 2022-07-04 RX ADMIN — POTASSIUM CHLORIDE 40 MEQ: 1500 TABLET, EXTENDED RELEASE ORAL at 08:30

## 2022-07-04 RX ADMIN — INSULIN LISPRO 4 UNITS: 100 INJECTION, SOLUTION INTRAVENOUS; SUBCUTANEOUS at 11:54

## 2022-07-04 RX ADMIN — INSULIN LISPRO 2 UNITS: 100 INJECTION, SOLUTION INTRAVENOUS; SUBCUTANEOUS at 07:31

## 2022-07-04 RX ADMIN — FUROSEMIDE 40 MG: 10 INJECTION, SOLUTION INTRAMUSCULAR; INTRAVENOUS at 16:07

## 2022-07-04 RX ADMIN — ACETAMINOPHEN 975 MG: 325 TABLET, FILM COATED ORAL at 01:29

## 2022-07-04 RX ADMIN — ASPIRIN 81 MG: 81 TABLET, COATED ORAL at 16:07

## 2022-07-04 RX ADMIN — INSULIN LISPRO 5 UNITS: 100 INJECTION, SOLUTION INTRAVENOUS; SUBCUTANEOUS at 16:03

## 2022-07-04 RX ADMIN — MAGNESIUM SULFATE HEPTAHYDRATE 2 G: 40 INJECTION, SOLUTION INTRAVENOUS at 07:15

## 2022-07-04 RX ADMIN — ROPINIROLE HYDROCHLORIDE 0.5 MG: 1 TABLET, FILM COATED ORAL at 21:33

## 2022-07-04 RX ADMIN — METOPROLOL SUCCINATE 25 MG: 25 TABLET, FILM COATED, EXTENDED RELEASE ORAL at 09:41

## 2022-07-04 RX ADMIN — INSULIN LISPRO 2 UNITS: 100 INJECTION, SOLUTION INTRAVENOUS; SUBCUTANEOUS at 21:32

## 2022-07-04 RX ADMIN — GABAPENTIN 300 MG: 300 CAPSULE ORAL at 08:30

## 2022-07-04 RX ADMIN — SODIUM CHLORIDE 3 ML: 9 INJECTION, SOLUTION INTRAMUSCULAR; INTRAVENOUS; SUBCUTANEOUS at 21:44

## 2022-07-04 RX ADMIN — INSULIN LISPRO 5 UNITS: 100 INJECTION, SOLUTION INTRAVENOUS; SUBCUTANEOUS at 11:54

## 2022-07-04 RX ADMIN — MAGNESIUM SULFATE HEPTAHYDRATE 2 G: 40 INJECTION, SOLUTION INTRAVENOUS at 04:04

## 2022-07-04 RX ADMIN — FUROSEMIDE 20 MG: 10 INJECTION, SOLUTION INTRAMUSCULAR; INTRAVENOUS at 09:40

## 2022-07-04 RX ADMIN — INSULIN LISPRO 2 UNITS: 100 INJECTION, SOLUTION INTRAVENOUS; SUBCUTANEOUS at 16:03

## 2022-07-04 NOTE — H&P
[place]  H&P- Lia King 1950, 70 y o  male MRN: [id number]  Unit/Bed#: -01 Encounter: [id number]  Primary Care Provider: Yolanda Hernandez MD   Date and time admitted to hospital: 7/4/2022 12:52 AM    * Pancytopenia ([address] )  Assessment & Plan  · WBC 1 51, hg 7 2, platelets 45   · Recent admission platelets had been 7434  Had been started on hydrea   · Hold hydrea   · Received 1 unit prbcs ordered by ER   · No signs of active bleeding   · Consult hematology     SIRS (systemic inflammatory response syndrome) (HCC)  Assessment & Plan  · SIRS criteria: tachycardia, WBC 1 51   · Lactic 2 1, 3 1   · Suspect due to poor perfusion from decreased hemoglobin   · Appears fluid overload on exam  Hold off on starting IV fluids   · Procal WNL   · COVID, flu, RSV negative   · UA negative   · Do not suspect acute infection  Patient with new pancytopenia after being started on hydrea       Acute on chronic combined systolic and diastolic congestive heart failure (HCC)  Assessment & Plan  Wt Readings from Last 3 Encounters:   05/26/22 89 3 kg (196 lb 13 9 oz)   02/25/22 94 4 kg (208 lb 3 2 oz)   11/29/13 93 4 kg (206 lb)     · Hypervolemic on exam   · BNP 1062  · Prior ECHO 05/22: EF 76%, grade 2 diastolic dysfunction   · Received 40 mg IV lasix in the ER   Continue with 20 mg IV BID   · Home regimen: 40 mg PO daily   · Monitor I/O and daily weight   · Fluid restriction   · telemetry   · Consult cardiology         Elevated troponin  Assessment & Plan  · Presented with CP that had occurred earlier in the day, now resolved   · Troponin 2212, 2791  · EKG: Sinus tachycardia 113   · ER discussed with cardiology who did not recommend transfer for cardiac cath   · Unable to be placed on heparin drip due to pancytopenia   · Telemetry   · Consult cardiology     Hypomagnesemia  Assessment & Plan  · Mag 1 4   · Received 2 mg IV mag   · Continue to monitor and replete as needed     Stage 3 chronic kidney disease, unspecified whether stage 3a or 3b CKD [facility])  Assessment & Plan  Lab Results   Component Value Date    EGFR 60 07/04/2022    EGFR 51 05/26/2022    EGFR 53 05/25/2022    CREATININE 1 20 07/04/2022    CREATININE 1 37 (H) 05/26/2022    CREATININE 1 33 (H) 05/25/2022   · Cr 1 20   · Baseline appears to be about 1 3  · Continue to monitor     Type 2 diabetes mellitus, with long-term current use of insulin (HCC)  Assessment & Plan  Lab Results   Component Value Date    HGBA1C 5 3 05/22/2022       No results for input(s): POCGLU in the last 72 hours  Blood Sugar Average: Last 72 hrs:  · Home regimen: Lantus 20 units hs, humalog 5 units tid   · SSI    Coronary artery disease involving native coronary artery  Assessment & Plan  · Plavix, BB, statin     HTN (hypertension)  Assessment & Plan  · Lisinopril 5 mg daily, lasix 40 mg finch, metoprolol succinate 25 mg daily     Multiple sclerosis (HCC)  Assessment & Plan  · Home regimen: baclofen and requip  · Chronic leg weakness, baseline       VTE Pharmacologic Prophylaxis: VTE Score: 3 Moderate Risk (Score 3-4) - Pharmacological DVT Prophylaxis Contraindicated  Sequential Compression Devices Ordered  Code Status: Level 3 - DNAR and DNI   Discussion with family: Updated  (wife) via phone  Anticipated Length of Stay: Patient will be admitted on an inpatient basis with an anticipated length of stay of greater than 2 midnights secondary to Pancytopenia   Total Time for Visit, including Counseling / Coordination of Care: 60 minutes Greater than 50% of this total time spent on direct patient counseling and coordination of care  Chief Complaint:     History of Present Illness:  Billy Cochran is a 70 y o  male with a PMH of CHF, CAD, HTN, MS, CKD 3, DM2 who presents with SOB and CP  CP felt on the left side as a pressure  Had occurred a couple of hours prior to arrival  Improved after receiving aspirin  Patient also reports increasing SOB   Not requiring supplemental 02  Appears wet on exam  Denies N/V/D, fevers, chills, hematuria, dysuria, or known blood loss  Compliant with home medications  Alert and oriented x 3     Review of Systems:  Review of Systems   Constitutional: Negative for fatigue and fever  HENT: Negative for sore throat  Respiratory: Positive for shortness of breath  Negative for cough and chest tightness  Cardiovascular: Positive for chest pain  Gastrointestinal: Negative for abdominal distention, abdominal pain, diarrhea, nausea and vomiting  Genitourinary: Negative for difficulty urinating  Musculoskeletal: Negative for arthralgias  Neurological: Negative for weakness and headaches  Psychiatric/Behavioral: Negative for agitation and behavioral problems  All other systems reviewed and are negative  Past Medical and Surgical History:   Past Medical History:   Diagnosis Date    Atrial fibrillation ([facility] [address] )     BPH (benign prostatic hyperplasia)     Congestive heart failure (CHF) (HCC)     Diabetes mellitus (HCC)     Hyperlipidemia     Hypertension     Multiple sclerosis (HCC)     Neuropathy     RLS (restless legs syndrome)        History reviewed  No pertinent surgical history  Meds/Allergies:  Prior to Admission medications    Medication Sig Start Date End Date Taking? Authorizing Provider   hydroxyurea (HYDREA) 500 mg capsule Take 1 capsule (500 mg total) by mouth 2 (two) times a day 7/1/22   Abbie Baca DO   acetaminophen (TYLENOL) 325 mg tablet Take 650 mg by mouth every 6 (six) hours as needed for mild pain    Historical Provider, MD   aspirin (ECOTRIN LOW STRENGTH) 81 mg EC tablet Take 81 mg by mouth in the morning      Historical Provider, MD   atorvastatin (LIPITOR) 40 mg tablet Take 1 tablet (40 mg total) by mouth daily with dinner 5/26/22   Santa Rose MD   baclofen 10 mg tablet Take 10 mg by mouth every 6 (six) hours as needed for muscle spasms    Historical Provider, MD   cholestyramine sugar free (QUESTRAN LIGHT) 4 g packet Take 4 g by mouth 2 (two) times a day    Historical Provider, MD   clopidogrel (PLAVIX) 75 mg tablet Take 75 mg by mouth daily    Historical Provider, MD   furosemide (LASIX) 40 mg tablet Take 1 tablet (40 mg total) by mouth daily 5/27/22   Santa Rose MD   gabapentin (NEURONTIN) 300 mg capsule Take 300 mg by mouth in the morning and 300 mg in the evening  2/20/22   Historical Provider, MD   gabapentin (NEURONTIN) 600 MG tablet Take 600 mg by mouth daily at bedtime 2/1/22   Historical Provider, MD   insulin glargine (LANTUS) 100 units/mL subcutaneous injection Inject 20 Units under the skin daily at bedtime 5/26/22   Santa Rose MD   insulin lispro (HumaLOG) 100 units/mL injection Inject 5 Units under the skin 3 (three) times a day with meals 5/26/22   Santa Rose MD   lisinopril (ZESTRIL) 5 mg tablet Take 1 tablet (5 mg total) by mouth daily 5/26/22   Santa Rose MD   loratadine (CLARITIN) 10 mg tablet Take 10 mg by mouth daily    Historical Provider, MD   metoprolol succinate (TOPROL-XL) 25 mg 24 hr tablet Take 1 tablet (25 mg total) by mouth daily 5/27/22   Santa Rose MD   pantoprazole (PROTONIX) 40 mg tablet Take 40 mg by mouth daily    Historical Provider, MD   potassium chloride (K-DUR,KLOR-CON) 20 mEq tablet Take 2 tablets (40 mEq total) by mouth daily 5/27/22   Santa Rose MD   rOPINIRole (REQUIP) 0 5 mg tablet Take 0 5 mg by mouth daily at bedtime    Historical Provider, MD   tamsulosin (FLOMAX) 0 4 mg Take 0 4 mg by mouth daily with dinner    Historical Provider, MD     I have reveiwed home medications using records provided by [facility]  Allergies:    Allergies   Allergen Reactions    Metformin GI Intolerance       Social History:  Marital Status: /Civil Union   Occupation: unknown   Patient Pre-hospital Living Situation: Home  Patient Pre-hospital Level of Mobility: walks with walker  Patient Pre-hospital Diet Restrictions: regular   Substance Use History:   Social History     Substance and Sexual Activity   Alcohol Use Not Currently     Social History     Tobacco Use   Smoking Status Never Smoker   Smokeless Tobacco Never Used     Social History     Substance and Sexual Activity   Drug Use Never       Family History:  Family History   Problem Relation Age of Onset    Colon cancer Neg Hx     Colon polyps Neg Hx        Physical Exam:     Vitals:   Blood Pressure: 112/57 (07/04/22 0630)  Pulse: 103 (07/04/22 0630)  Temperature: 98 °F (36 7 °C) (07/04/22 0630)  Temp Source: Oral (07/04/22 0630)  Respirations: 21 (07/04/22 0630)  SpO2: 95 % (07/04/22 0630)    Physical Exam  Vitals and nursing note reviewed  Constitutional:       Appearance: Normal appearance  HENT:      Head: Normocephalic  Eyes:      Extraocular Movements: Extraocular movements intact  Pupils: Pupils are equal, round, and reactive to light  Cardiovascular:      Rate and Rhythm: Normal rate and regular rhythm  Heart sounds: No murmur heard  Pulmonary:      Effort: No respiratory distress  Breath sounds: Rales present  No wheezing  Abdominal:      General: Bowel sounds are normal  There is no distension  Tenderness: There is no abdominal tenderness  There is no guarding  Musculoskeletal:         General: Normal range of motion  Cervical back: Normal range of motion  Right lower leg: No edema  Left lower leg: No edema  Comments: Generalized weakness   Skin:     General: Skin is warm  Neurological:      General: No focal deficit present  Mental Status: He is alert and oriented to person, place, and time  Psychiatric:         Mood and Affect: Mood normal          Behavior: Behavior normal          Thought Content:  Thought content normal           Additional Data:     Lab Results:  Results from last 7 days   Lab Units 07/04/22  0107   WBC Thousand/uL 1 51*   HEMOGLOBIN g/dL 7 2*   HEMATOCRIT % 21 8*   PLATELETS Thousands/uL 45*   LYMPHO PCT % 41   MONO PCT % 1*   EOS PCT % 2     Results from last 7 days   Lab Units 07/04/22  0107   SODIUM mmol/L 137   POTASSIUM mmol/L 4 2   CHLORIDE mmol/L 103   CO2 mmol/L 22   BUN mg/dL 29*   CREATININE mg/dL 1 20   ANION GAP mmol/L 12   CALCIUM mg/dL 9 0   ALBUMIN g/dL 3 7   TOTAL BILIRUBIN mg/dL 1 30*   ALK PHOS U/L 119*   ALT U/L 39   AST U/L 54*   GLUCOSE RANDOM mg/dL 280*     Results from last 7 days   Lab Units 07/04/22  0107   INR  1 09             Results from last 7 days   Lab Units 07/04/22  0412 07/04/22  0107   LACTIC ACID mmol/L 3 1* 2 1*   PROCALCITONIN ng/ml  --  0 08       Imaging: Reviewed radiology reports from this admission including: chest xray  XR chest portable   ED Interpretation by Moni Bowling DO (07/04 9920)   chf        ** Please Note: This note has been constructed using a voice recognition system   **

## 2022-07-04 NOTE — PLAN OF CARE
Problem: MOBILITY - ADULT  Goal: Maintain or return to baseline ADL function  Description: INTERVENTIONS:  -  Assess patient's ability to carry out ADLs; assess patient's baseline for ADL function and identify physical deficits which impact ability to perform ADLs (bathing, care of mouth/teeth, toileting, grooming, dressing, etc )  - Assess/evaluate cause of self-care deficits   - Assess range of motion  - Assess patient's mobility; develop plan if impaired  - Assess patient's need for assistive devices and provide as appropriate  - Encourage maximum independence but intervene and supervise when necessary  - Involve family in performance of ADLs  - Assess for home care needs following discharge   - Consider OT consult to assist with ADL evaluation and planning for discharge  - Provide patient education as appropriate  7/4/2022 0517 by Marylene Rast, RN  Outcome: Progressing  7/4/2022 0517 by Marylene Rast, RN  Outcome: Progressing  Goal: Maintains/Returns to pre admission functional level  Description: INTERVENTIONS:  - Perform BMAT or MOVE assessment daily    - Set and communicate daily mobility goal to care team and patient/family/caregiver     - Collaborate with rehabilitation services on mobility goals if consulted  - Out of bed for toileting  - Record patient progress and toleration of activity level   7/4/2022 0517 by Marylene Rast, RN  Outcome: Progressing  7/4/2022 0517 by Marylene Rast, RN  Outcome: Progressing     Problem: Potential for Falls  Goal: Patient will remain free of falls  Description: INTERVENTIONS:  - Educate patient/family on patient safety including physical limitations  - Instruct patient to call for assistance with activity   - Consult OT/PT to assist with strengthening/mobility   - Keep Call bell within reach  - Keep bed low and locked with side rails adjusted as appropriate  - Keep care items and personal belongings within reach  - Initiate and maintain comfort rounds  - Make Fall Risk Sign visible to staff  - Apply yellow socks and bracelet for high fall risk patients  - Consider moving patient to room near nurses station  7/4/2022 0517 by Lee Serrano RN  Outcome: Progressing  7/4/2022 0517 by Lee Serrano RN  Outcome: Progressing     Problem: PAIN - ADULT  Goal: Verbalizes/displays adequate comfort level or baseline comfort level  Description: Interventions:  - Encourage patient to monitor pain and request assistance  - Assess pain using appropriate pain scale  - Administer analgesics based on type and severity of pain and evaluate response  - Implement non-pharmacological measures as appropriate and evaluate response  - Consider cultural and social influences on pain and pain management  - Notify physician/advanced practitioner if interventions unsuccessful or patient reports new pain  Outcome: Progressing     Problem: INFECTION - ADULT  Goal: Absence or prevention of progression during hospitalization  Description: INTERVENTIONS:  - Assess and monitor for signs and symptoms of infection  - Monitor lab/diagnostic results  - Monitor all insertion sites, i e  indwelling lines, tubes, and drains  - Monitor endotracheal if appropriate and nasal secretions for changes in amount and color  - [place] appropriate cooling/warming therapies per order  - Administer medications as ordered  - Instruct and encourage patient and family to use good hand hygiene technique  - Identify and instruct in appropriate isolation precautions for identified infection/condition  Outcome: Progressing  Goal: Absence of fever/infection during neutropenic period  Description: INTERVENTIONS:  - Monitor WBC    Outcome: Progressing     Problem: SAFETY ADULT  Goal: Maintain or return to baseline ADL function  Description: INTERVENTIONS:  -  Assess patient's ability to carry out ADLs; assess patient's baseline for ADL function and identify physical deficits which impact ability to perform ADLs (bathing, care of mouth/teeth, toileting, grooming, dressing, etc )  - Assess/evaluate cause of self-care deficits   - Assess range of motion  - Assess patient's mobility; develop plan if impaired  - Assess patient's need for assistive devices and provide as appropriate  - Encourage maximum independence but intervene and supervise when necessary  - Involve family in performance of ADLs  - Assess for home care needs following discharge   - Consider OT consult to assist with ADL evaluation and planning for discharge  - Provide patient education as appropriate  7/4/2022 0517 by Sven Scott RN  Outcome: Progressing  7/4/2022 0517 by Sven Scott RN  Outcome: Progressing  Goal: Maintains/Returns to pre admission functional level  Description: INTERVENTIONS:  - Perform BMAT or MOVE assessment daily    - Set and communicate daily mobility goal to care team and patient/family/caregiver     - Collaborate with rehabilitation services on mobility goals if consulted  - Out of bed for toileting  - Record patient progress and toleration of activity level   7/4/2022 0517 by Sven Scott RN  Outcome: Progressing  7/4/2022 0517 by Sven Scott RN  Outcome: Progressing  Goal: Patient will remain free of falls  Description: INTERVENTIONS:  - Educate patient/family on patient safety including physical limitations  - Instruct patient to call for assistance with activity   - Consult OT/PT to assist with strengthening/mobility   - Keep Call bell within reach  - Keep bed low and locked with side rails adjusted as appropriate  - Keep care items and personal belongings within reach  - Initiate and maintain comfort rounds  - Make Fall Risk Sign visible to staff  - Apply yellow socks and bracelet for high fall risk patients  - Consider moving patient to room near nurses station  7/4/2022 0517 by Sven Scott RN  Outcome: Progressing  7/4/2022 0517 by Sven Scott RN  Outcome: Progressing     Problem: DISCHARGE PLANNING  Goal: Discharge to home or other facility with appropriate resources  Description: INTERVENTIONS:  - Identify barriers to discharge w/patient and caregiver  - Arrange for needed discharge resources and transportation as appropriate  - Identify discharge learning needs (meds, wound care, etc )  - Arrange for interpretive services to assist at discharge as needed  - Refer to Case Management Department for coordinating discharge planning if the patient needs post-hospital services based on physician/advanced practitioner order or complex needs related to functional status, cognitive ability, or social support system  Outcome: Progressing     Problem: Knowledge Deficit  Goal: Patient/family/caregiver demonstrates understanding of disease process, treatment plan, medications, and discharge instructions  Description: Complete learning assessment and assess knowledge base    Interventions:  - Provide teaching at level of understanding  - Provide teaching via preferred learning methods  Outcome: Progressing     Problem: CARDIOVASCULAR - ADULT  Goal: Maintains optimal cardiac output and hemodynamic stability  Description: INTERVENTIONS:  - Monitor I/O, vital signs and rhythm  - Monitor for S/S and trends of decreased cardiac output  - Administer and titrate ordered vasoactive medications to optimize hemodynamic stability  - Assess quality of pulses, skin color and temperature  - Assess for signs of decreased coronary artery perfusion  - Instruct patient to report change in severity of symptoms  Outcome: Progressing  Goal: Absence of cardiac dysrhythmias or at baseline rhythm  Description: INTERVENTIONS:  - Continuous cardiac monitoring, vital signs, obtain 12 lead EKG if ordered  - Administer antiarrhythmic and heart rate control medications as ordered  - Monitor electrolytes and administer replacement therapy as ordered  Outcome: Progressing     Problem: METABOLIC, FLUID AND ELECTROLYTES - ADULT  Goal: Electrolytes maintained within normal limits  Description: INTERVENTIONS:  - Monitor labs and assess patient for signs and symptoms of electrolyte imbalances  - Administer electrolyte replacement as ordered  - Monitor response to electrolyte replacements, including repeat lab results as appropriate  - Instruct patient on fluid and nutrition as appropriate  Outcome: Progressing  Goal: Fluid balance maintained  Description: INTERVENTIONS:  - Monitor labs   - Monitor I/O and WT  - Instruct patient on fluid and nutrition as appropriate  - Assess for signs & symptoms of volume excess or deficit  Outcome: Progressing  Goal: Glucose maintained within target range  Description: INTERVENTIONS:  - Monitor Blood Glucose as ordered  - Assess for signs and symptoms of hyperglycemia and hypoglycemia  - Administer ordered medications to maintain glucose within target range  - Assess nutritional intake and initiate nutrition service referral as needed  Outcome: Progressing     Problem: SKIN/TISSUE INTEGRITY - ADULT  Goal: Skin Integrity remains intact(Skin Breakdown Prevention)  Description: Assess:  -Inspect skin when repositioning, toileting, and assisting with ADLS  -Assess extremities for adequate circulation and sensation     Bed Management:  -Have minimal linens on bed & keep smooth, unwrinkled  -Change linens as needed when moist or perspiring    Toileting:  -Offer bedside commode    Activity:  -Encourage activity and walks on unit  -Encourage or provide ROM exercises     Skin Care:  -Avoid use of baby powder, tape, friction and shearing, hot water or constrictive clothing  Outcome: Progressing  Goal: Pressure injury heals and does not worsen  Description: Interventions:  - Implement low air loss mattress or specialty surface (Criteria met)  - Apply silicone foam dressing  - Consider nutrition services referral as needed  Outcome: Progressing     Problem: HEMATOLOGIC - ADULT  Goal: Maintains hematologic stability  Description: INTERVENTIONS  - Assess for signs and symptoms of bleeding or hemorrhage  - Monitor labs  - Administer supportive blood products/factors as ordered and appropriate  Outcome: Progressing

## 2022-07-04 NOTE — ASSESSMENT & PLAN NOTE
Wt Readings from Last 3 Encounters:   05/26/22 89 3 kg (196 lb 13 9 oz)   02/25/22 94 4 kg (208 lb 3 2 oz)   11/29/13 93 4 kg (206 lb)     · Hypervolemic on exam   · BNP 1062  · Prior ECHO 05/22: EF 01%, grade 2 diastolic dysfunction   · Received 40 mg IV lasix in the ER   Continue with 20 mg IV BID   · Home regimen: 40 mg PO daily   · Monitor I/O and daily weight   · Fluid restriction   · telemetry   · Consult cardiology

## 2022-07-04 NOTE — ASSESSMENT & PLAN NOTE
· Presented with CP that had occurred earlier in the day, now resolved   · Troponin 2212, 2791  · EKG: Sinus tachycardia 113   · ER discussed with cardiology who did not recommend transfer for cardiac cath   · Unable to be placed on heparin drip due to pancytopenia   · Telemetry   · Consult cardiology

## 2022-07-04 NOTE — ASSESSMENT & PLAN NOTE
· WBC 1 51, hg 7 2, platelets 45   · Recent admission platelets had been 5996   Had been started on hydrea   · Hold hydrea   · Received 1 unit prbcs ordered by ER   · No signs of active bleeding   · Consult hematology

## 2022-07-04 NOTE — CONSULTS
Consultation - Cardiology   Carrie Bond 70 y o  male MRN: [id number]  Unit/Bed#: -01 Encounter: [id number]    Assessment/Plan     Assessment:    Acute on Chronic Diastolic CHF  Non MI troponin elevation secondary to decompensated CHF  CAD  Pancytopenia  Diabetes  PSVT  HTN  NSVT    Plan:      1  Acute on Chronic Diastolic CHF: He has pulmonary edema on exam  Continue with lasix 40 mg IV BID  Trend renal function, I/O and daily weights  Check echocardiogram      2  CAD: Continue with aspirin, metoprolol and atorvastatin  Hold plavix given thrombocytopenia at this time  Patient requests conservative medical management  Continue metoprolol and replete K and Mg for NSVT  3  Hypertension: Continue with lisinopril and metoprolol  History of Present Illness   Physician Requesting Consult: Blaire Ruth MD  Reason for Consult / Principal Problem: Chest Pain  HPI: Carrie Bond is a 70y o  year old male who presents with chest pain and shortness of breath  He has a history of CAD, Chronic Diastolic CHF,  HTN, PSVT and Diabetes  Patient has declined cardiac catheterization in the past and prefers conservative medical management  Patient also has a history of thrombocytosis and he has been on aspirin, plavix and hydroxyurea  Chest xray on admission revealed pulmonary edema  Labs also significant for pancytopenia, and elevated troponin 5903  Patient currently denies any chest pain, dyspnea or palpitations at rest      Inpatient consult to Cardiology  Consult performed by: Bri Figueroa MD  Consult ordered by: Tabitha Holden PA-C          Review of Systems   Constitutional: Positive for fatigue  Negative for chills and fever  HENT: Negative for ear pain and sore throat  Eyes: Negative for pain and visual disturbance  Respiratory: Positive for shortness of breath  Negative for cough  Cardiovascular: Positive for chest pain  Negative for palpitations     Gastrointestinal: Negative for abdominal STRESS ECHOCARDIOGRAM



INDICATIONS:

Chest pain, palpitations.



MEDICATIONS:

Aspirin, Plavix, Coreg, Cozaar, hydralazine.



BASELINE HEART RATE:

70



BASELINE BLOOD PRESSURE:

139/69



MAXIMUM HEART RATE:

136



MAXIMUM BLOOD PRESSURE:

198/98



85% MPHR:

135



100% MPHR:

159



METS:

10.3



MAXIMUM STAGE REACHED:

3



TOTAL EXERCISE TIME:

9:00



CLINICAL INFORMATION:

Baseline rhythm sinus mechanism, rate 70, normal axis and intervals.  Normal

echocardiogram.  Baseline blood pressure 139/69 mmHg.  Patient exercised on Teddy

protocol for 9 minutes reaching peak rate 136 beats per minute which is equal to 85%

maximum predicted heart rate.  Peak blood pressure 198/98 mmHg. Test was terminated due

to fatigue. There was no chest pain.  Electrocardiograph monitoring revealed no

evidence of diagnostic ischemic ST deviation.



FINDINGS:

Baseline echocardiogram revealed normal wall motion.  At peak exercise, there was

normal wall motion augmentation with no hypokinesis or dyskinesis.



CONCLUSION:

1. Good exercise tolerance with normal electrocardiographic response to exercise.

2. Normal stress echocardiogram with no evidence of stress induced ischemia.





MMODL / IJN: [id number] / Job#: [id number] pain and vomiting  Genitourinary: Negative for dysuria and hematuria  Musculoskeletal: Negative for arthralgias and back pain  Skin: Negative for color change and rash  Neurological: Negative for seizures and syncope  All other systems reviewed and are negative  Historical Information   Past Medical History:   Diagnosis Date    Atrial fibrillation ([address] )     BPH (benign prostatic hyperplasia)     Congestive heart failure (CHF) (HCC)     Diabetes mellitus (HCC)     Hyperlipidemia     Hypertension     Multiple sclerosis (HCC)     Neuropathy     RLS (restless legs syndrome)      History reviewed  No pertinent surgical history    Social History     Substance and Sexual Activity   Alcohol Use Not Currently     Social History     Substance and Sexual Activity   Drug Use Never     E-Cigarette/Vaping    E-Cigarette Use Never User      E-Cigarette/Vaping Substances     Social History     Tobacco Use   Smoking Status Never Smoker   Smokeless Tobacco Never Used     Family History:   Family History   Problem Relation Age of Onset    Colon cancer Neg Hx     Colon polyps Neg Hx        Meds/Allergies   current meds:   Current Facility-Administered Medications   Medication Dose Route Frequency    acetaminophen (TYLENOL) tablet 650 mg  650 mg Oral Q6H PRN    aspirin (ECOTRIN LOW STRENGTH) EC tablet 81 mg  81 mg Oral Daily    atorvastatin (LIPITOR) tablet 40 mg  40 mg Oral Daily With Dinner    baclofen tablet 10 mg  10 mg Oral Q6H PRN    cholestyramine sugar free (QUESTRAN LIGHT) packet 4 g  4 g Oral BID    furosemide (LASIX) injection 40 mg  40 mg Intravenous BID (diuretic)    gabapentin (NEURONTIN) capsule 300 mg  300 mg Oral BID    gabapentin (NEURONTIN) capsule 600 mg  600 mg Oral HS    insulin glargine (LANTUS) subcutaneous injection 20 Units 0 2 mL  20 Units Subcutaneous HS    insulin lispro (HumaLOG) 100 units/mL subcutaneous injection 1-6 Units  1-6 Units Subcutaneous TID AC    insulin lispro (HumaLOG) 100 units/mL subcutaneous injection 1-6 Units  1-6 Units Subcutaneous HS    insulin lispro (HumaLOG) 100 units/mL subcutaneous injection 5 Units  5 Units Subcutaneous TID With Meals    lisinopril (ZESTRIL) tablet 5 mg  5 mg Oral Daily    loratadine (CLARITIN) tablet 10 mg  10 mg Oral Daily    metoprolol succinate (TOPROL-XL) 24 hr tablet 25 mg  25 mg Oral Daily    ondansetron (ZOFRAN) injection 4 mg  4 mg Intravenous Q6H PRN    pantoprazole (PROTONIX) EC tablet 40 mg  40 mg Oral Daily Before Breakfast    potassium chloride (K-DUR,KLOR-CON) CR tablet 40 mEq  40 mEq Oral Daily    rOPINIRole (REQUIP) tablet 0 5 mg  0 5 mg Oral HS    sodium chloride (PF) 0 9 % injection 3 mL  3 mL Intravenous Q8H [address]    tamsulosin (FLOMAX) capsule 0 4 mg  0 4 mg Oral Daily With Dinner     Allergies   Allergen Reactions    Metformin GI Intolerance       Objective   Vitals: Blood pressure 106/57, pulse 91, temperature 97 6 °F (36 4 °C), temperature source Oral, resp  rate 18, SpO2 92 %  Orthostatic Blood Pressures    Flowsheet Row Most Recent Value   Blood Pressure 106/57 filed at 07/04/2022 1030   Patient Position - Orthostatic VS Lying filed at 07/04/2022 0730            Intake/Output Summary (Last 24 hours) at 7/4/2022 1119  Last data filed at 7/4/2022 0941  Gross per 24 hour   Intake 632 ml   Output 1500 ml   Net -868 ml       Invasive Devices  Report    Peripheral Intravenous Line  Duration           Peripheral IV 07/04/22 Left Arm <1 day    Peripheral IV 07/04/22 Right Antecubital <1 day          Drain  Duration           External Urinary Catheter Medium 42 days    Urethral Catheter <1 day                Physical Exam  Vitals and nursing note reviewed  Constitutional:       Appearance: He is well-developed  HENT:      Head: Normocephalic and atraumatic  Eyes:      Conjunctiva/sclera: Conjunctivae normal    Neck:      Vascular: JVD present     Cardiovascular:      Rate and Rhythm: Normal rate and regular rhythm  Heart sounds: No murmur heard  Pulmonary:      Effort: Pulmonary effort is normal  No respiratory distress  Breath sounds: Rales present  Abdominal:      Palpations: Abdomen is soft  Tenderness: There is no abdominal tenderness  Musculoskeletal:      Cervical back: Neck supple  Skin:     General: Skin is warm and dry  Neurological:      Mental Status: He is alert  Lab Results:   I have personally reviewed pertinent lab results  CBC with diff:   Results from last 7 days   Lab Units 07/04/22  1001 07/04/22  0107   WBC Thousand/uL  --  1 51*   RBC Million/uL  --  2 55*   HEMOGLOBIN g/dL 7 6* 7 2*   HEMATOCRIT % 23 4* 21 8*   MCV fL  --  86   MCH pg  --  28 2   MCHC g/dL  --  33 0   RDW %  --  19 7*   MPV fL  --  10 2   PLATELETS Thousands/uL  --  45*     CMP:   Results from last 7 days   Lab Units 07/04/22  0107   SODIUM mmol/L 137   CHLORIDE mmol/L 103   CO2 mmol/L 22   BUN mg/dL 29*   CREATININE mg/dL 1 20   CALCIUM mg/dL 9 0   AST U/L 54*   ALT U/L 39   ALK PHOS U/L 119*   EGFR ml/min/1 73sq m 60     HS Troponin:   0   Lab Value Date/Time    HSTNI 1,439 (H) 05/23/2022 0226    HSTNI0 5,903 (H) 07/04/2022 1001    HSTNI2 2,791 (H) 07/04/2022 0412    HSTNI4 4,556 (H) 07/04/2022 4882    HSTNI4 1,090 (H) 05/23/2022 0130     BNP:   Results from last 7 days   Lab Units 07/04/22  0107   POTASSIUM mmol/L 4 2   CHLORIDE mmol/L 103   CO2 mmol/L 22   BUN mg/dL 29*   CREATININE mg/dL 1 20   CALCIUM mg/dL 9 0   EGFR ml/min/1 73sq m 60     Coags:   Results from last 7 days   Lab Units 07/04/22  0107   PTT seconds 32   INR  1 09     TSH:     Magnesium:   Results from last 7 days   Lab Units 07/04/22  0107   MAGNESIUM mg/dL 1 4*     Lipid Profile:     Imaging: I have personally reviewed pertinent films in PACS  EKG: NSR Inferior Infarct   NSTT      Code Status: Level 3 - DNAR and DNI  Advance Directive and Living Will:      Power of :    POLST:      Counseling / Coordination of Care  Total floor / unit time spent today 45 minutes  Greater than 50% of total time was spent with the patient and / or family counseling and / or coordination of care  A description of the counseling / coordination of care

## 2022-07-04 NOTE — ASSESSMENT & PLAN NOTE
Lab Results   Component Value Date    EGFR 60 07/04/2022    EGFR 51 05/26/2022    EGFR 53 05/25/2022    CREATININE 1 20 07/04/2022    CREATININE 1 37 (H) 05/26/2022    CREATININE 1 33 (H) 05/25/2022   · Cr 1 20   · Baseline appears to be about 1 3  · Continue to monitor

## 2022-07-04 NOTE — SEPSIS NOTE
Sepsis Note   Alexis Andrews 70 y o  male MRN: [id number]  Unit/Bed#: ED 08 Encounter: [id number]       qSOFA     [address] Name 07/04/22 0230 07/04/22 0200 07/04/22 0130 07/04/22 0108       Altered mental status GCS < 15 -- -- -- --     Respiratory Rate > / =22 0 0 0 0     Systolic BP < / =165 0 0 0 0     Q Sofa Score 0 0 0 0                Initial Sepsis Screening     Row Name 07/04/22 8952                Is the patient's history suggestive of a new or worsening infection? No  -STEPHEN        Suspected source of infection --        Are two or more of the following signs & symptoms of infection both present and new to the patient? --        Indicate SIRS criteria --        If the answer is yes to both questions, suspicion of sepsis is present --        If severe sepsis is present AND tissue hypoperfusion perists in the hour after fluid resuscitation or lactate > 4, the patient meets criteria for SEPTIC SHOCK --        Are any of the following organ dysfunction criteria present within 6 hours of suspected infection and SIRS criteria that are NOT considered to be chronic conditions?  --        Organ dysfunction --        Date of presentation of severe sepsis --        Time of presentation of severe sepsis --        Tissue hypoperfusion persists in the hour after crystalloid fluid administration, evidenced, by either: --        Was hypotension present within one hour of the conclusion of crystalloid fluid administration? --        Date of presentation of septic shock --        Time of presentation of septic shock --              User Key  (r) = Recorded By, (t) = Taken By, (c) = Cosigned By    [address] Name Provider Type    [address], DO Physician

## 2022-07-04 NOTE — ASSESSMENT & PLAN NOTE
· SIRS criteria: tachycardia, WBC 1 51   · Lactic 2 1, 3 1   · Suspect due to poor perfusion from decreased hemoglobin   · Appears fluid overload on exam  Hold off on starting IV fluids   · Procal WNL   · COVID, flu, RSV negative   · UA negative   · Do not suspect acute infection   Patient with new pancytopenia after being started on hydrea

## 2022-07-04 NOTE — PLAN OF CARE
Problem: MOBILITY - ADULT  Goal: Maintain or return to baseline ADL function  Description: INTERVENTIONS:  -  Assess patient's ability to carry out ADLs; assess patient's baseline for ADL function and identify physical deficits which impact ability to perform ADLs (bathing, care of mouth/teeth, toileting, grooming, dressing, etc )  - Assess/evaluate cause of self-care deficits   - Assess range of motion  - Assess patient's mobility; develop plan if impaired  - Assess patient's need for assistive devices and provide as appropriate  - Encourage maximum independence but intervene and supervise when necessary  - Involve family in performance of ADLs  - Assess for home care needs following discharge   - Consider OT consult to assist with ADL evaluation and planning for discharge  - Provide patient education as appropriate  Outcome: Progressing  Goal: Maintains/Returns to pre admission functional level  Description: INTERVENTIONS:  - Perform BMAT or MOVE assessment daily    - Set and communicate daily mobility goal to care team and patient/family/caregiver     - Collaborate with rehabilitation services on mobility goals if consulted  - Out of bed for toileting  - Record patient progress and toleration of activity level   Outcome: Progressing     Problem: Potential for Falls  Goal: Patient will remain free of falls  Description: INTERVENTIONS:  - Educate patient/family on patient safety including physical limitations  - Instruct patient to call for assistance with activity   - Consult OT/PT to assist with strengthening/mobility   - Keep Call bell within reach  - Keep bed low and locked with side rails adjusted as appropriate  - Keep care items and personal belongings within reach  - Initiate and maintain comfort rounds  - Make Fall Risk Sign visible to staff  - Apply yellow socks and bracelet for high fall risk patients  - Consider moving patient to room near nurses station  Outcome: Progressing     Problem: PAIN - ADULT  Goal: Verbalizes/displays adequate comfort level or baseline comfort level  Description: Interventions:  - Encourage patient to monitor pain and request assistance  - Assess pain using appropriate pain scale  - Administer analgesics based on type and severity of pain and evaluate response  - Implement non-pharmacological measures as appropriate and evaluate response  - Consider cultural and social influences on pain and pain management  - Notify physician/advanced practitioner if interventions unsuccessful or patient reports new pain  Outcome: Progressing     Problem: INFECTION - ADULT  Goal: Absence or prevention of progression during hospitalization  Description: INTERVENTIONS:  - Assess and monitor for signs and symptoms of infection  - Monitor lab/diagnostic results  - Monitor all insertion sites, i e  indwelling lines, tubes, and drains  - Monitor endotracheal if appropriate and nasal secretions for changes in amount and color  - [place] appropriate cooling/warming therapies per order  - Administer medications as ordered  - Instruct and encourage patient and family to use good hand hygiene technique  - Identify and instruct in appropriate isolation precautions for identified infection/condition  Outcome: Progressing  Goal: Absence of fever/infection during neutropenic period  Description: INTERVENTIONS:  - Monitor WBC    Outcome: Progressing     Problem: SAFETY ADULT  Goal: Maintain or return to baseline ADL function  Description: INTERVENTIONS:  -  Assess patient's ability to carry out ADLs; assess patient's baseline for ADL function and identify physical deficits which impact ability to perform ADLs (bathing, care of mouth/teeth, toileting, grooming, dressing, etc )  - Assess/evaluate cause of self-care deficits   - Assess range of motion  - Assess patient's mobility; develop plan if impaired  - Assess patient's need for assistive devices and provide as appropriate  - Encourage maximum independence but intervene and supervise when necessary  - Involve family in performance of ADLs  - Assess for home care needs following discharge   - Consider OT consult to assist with ADL evaluation and planning for discharge  - Provide patient education as appropriate  Outcome: Progressing  Goal: Maintains/Returns to pre admission functional level  Description: INTERVENTIONS:  - Perform BMAT or MOVE assessment daily    - Set and communicate daily mobility goal to care team and patient/family/caregiver     - Collaborate with rehabilitation services on mobility goals if consulted  - Out of bed for toileting  - Record patient progress and toleration of activity level   Outcome: Progressing  Goal: Patient will remain free of falls  Description: INTERVENTIONS:  - Educate patient/family on patient safety including physical limitations  - Instruct patient to call for assistance with activity   - Consult OT/PT to assist with strengthening/mobility   - Keep Call bell within reach  - Keep bed low and locked with side rails adjusted as appropriate  - Keep care items and personal belongings within reach  - Initiate and maintain comfort rounds  - Apply yellow socks and bracelet for high fall risk patients  - Consider moving patient to room near nurses station  Outcome: Progressing     Problem: DISCHARGE PLANNING  Goal: Discharge to home or other facility with appropriate resources  Description: INTERVENTIONS:  - Identify barriers to discharge w/patient and caregiver  - Arrange for needed discharge resources and transportation as appropriate  - Identify discharge learning needs (meds, wound care, etc )  - Arrange for interpretive services to assist at discharge as needed  - Refer to Case Management Department for coordinating discharge planning if the patient needs post-hospital services based on physician/advanced practitioner order or complex needs related to functional status, cognitive ability, or social support system  Outcome: Progressing     Problem: Knowledge Deficit  Goal: Patient/family/caregiver demonstrates understanding of disease process, treatment plan, medications, and discharge instructions  Description: Complete learning assessment and assess knowledge base    Interventions:  - Provide teaching at level of understanding  - Provide teaching via preferred learning methods  Outcome: Progressing     Problem: CARDIOVASCULAR - ADULT  Goal: Maintains optimal cardiac output and hemodynamic stability  Description: INTERVENTIONS:  - Monitor I/O, vital signs and rhythm  - Monitor for S/S and trends of decreased cardiac output  - Administer and titrate ordered vasoactive medications to optimize hemodynamic stability  - Assess quality of pulses, skin color and temperature  - Assess for signs of decreased coronary artery perfusion  - Instruct patient to report change in severity of symptoms  Outcome: Progressing  Goal: Absence of cardiac dysrhythmias or at baseline rhythm  Description: INTERVENTIONS:  - Continuous cardiac monitoring, vital signs, obtain 12 lead EKG if ordered  - Administer antiarrhythmic and heart rate control medications as ordered  - Monitor electrolytes and administer replacement therapy as ordered  Outcome: Progressing     Problem: METABOLIC, FLUID AND ELECTROLYTES - ADULT  Goal: Electrolytes maintained within normal limits  Description: INTERVENTIONS:  - Monitor labs and assess patient for signs and symptoms of electrolyte imbalances  - Administer electrolyte replacement as ordered  - Monitor response to electrolyte replacements, including repeat lab results as appropriate  - Instruct patient on fluid and nutrition as appropriate  Outcome: Progressing  Goal: Fluid balance maintained  Description: INTERVENTIONS:  - Monitor labs   - Monitor I/O and WT  - Instruct patient on fluid and nutrition as appropriate  - Assess for signs & symptoms of volume excess or deficit  Outcome: Progressing  Goal: Glucose maintained within target range  Description: INTERVENTIONS:  - Monitor Blood Glucose as ordered  - Assess for signs and symptoms of hyperglycemia and hypoglycemia  - Administer ordered medications to maintain glucose within target range  - Assess nutritional intake and initiate nutrition service referral as needed  Outcome: Progressing     Problem: SKIN/TISSUE INTEGRITY - ADULT  Goal: Skin Integrity remains intact(Skin Breakdown Prevention)  Description: Assess:  -Inspect skin when repositioning, toileting, and assisting with ADLS  -Assess extremities for adequate circulation and sensation     Bed Management:  -Have minimal linens on bed & keep smooth, unwrinkled  -Change linens as needed when moist or perspiring        Activity:  -Encourage activity and walks on unit  -Encourage or provide ROM exercises   -Use appropriate equipment to lift or move patient in bed  Skin Care:  -Avoid use of baby powder, tape, friction and shearing, hot water or constrictive clothing  -Do not massage red bony areas      Outcome: Progressing  Goal: Pressure injury heals and does not worsen  Description: Interventions:  - Implement low air loss mattress or specialty surface (Criteria met)  - Apply silicone foam dressing  - Apply fecal or urinary incontinence containment device   - Utilize friction reducing device or surface for transfers   - Consider nutrition services referral as needed  Outcome: Progressing     Problem: HEMATOLOGIC - ADULT  Goal: Maintains hematologic stability  Description: INTERVENTIONS  - Assess for signs and symptoms of bleeding or hemorrhage  - Monitor labs  - Administer supportive blood products/factors as ordered and appropriate  Outcome: Progressing     Problem: Prexisting or High Potential for Compromised Skin Integrity  Goal: Skin integrity is maintained or improved  Description: INTERVENTIONS:  - Identify patients at risk for skin breakdown  - Assess and monitor skin integrity  - Assess and monitor nutrition and hydration status  - Monitor labs   - Assess for incontinence   - Turn and reposition patient  - Assist with mobility/ambulation  - Relieve pressure over bony prominences  - Avoid friction and shearing  - Provide appropriate hygiene as needed including keeping skin clean and dry  - Evaluate need for skin moisturizer/barrier cream  - Collaborate with interdisciplinary team   - Patient/family teaching  - Consider wound care consult   Outcome: Progressing

## 2022-07-04 NOTE — ED PROVIDER NOTES
History  Chief Complaint   Patient presents with    Chest Pain     Pt c/o of chest pain and arm numbness  19-year-old male past medical history MS, right sacral decubitus, pulmonary edema, insulin-dependent diabetes, restless leg, CHF, AFib brought in by ambulance from local nursing home with shortness of breath and left chest pressure sensation 3-4 hours ago improved now with a dose of aspirin  Symptoms occurred at rest   He had associated wet cough  He has some generalized abdominal pain  He has bilateral shoulder pain and weakness  Similar symptoms were he was admitted to the hospital several months ago  Prior to Admission Medications   Prescriptions Last Dose Informant Patient Reported? Taking?   acetaminophen (TYLENOL) 325 mg tablet  Outside Facility (Specify) Yes No   Sig: Take 650 mg by mouth every 6 (six) hours as needed for mild pain   aspirin (ECOTRIN LOW STRENGTH) 81 mg EC tablet  Outside Facility (Specify) Yes No   Sig: Take 81 mg by mouth in the morning     atorvastatin (LIPITOR) 40 mg tablet  Outside Facility (Specify) No No   Sig: Take 1 tablet (40 mg total) by mouth daily with dinner   baclofen 10 mg tablet  Outside Facility (Specify) Yes No   Sig: Take 10 mg by mouth every 6 (six) hours as needed for muscle spasms   cholestyramine sugar free (QUESTRAN LIGHT) 4 g packet  Outside Facility (Specify) Yes No   Sig: Take 4 g by mouth 2 (two) times a day   clopidogrel (PLAVIX) 75 mg tablet  Outside Facility (Specify) Yes No   Sig: Take 75 mg by mouth daily   furosemide (LASIX) 40 mg tablet  Outside Facility (Specify) No No   Sig: Take 1 tablet (40 mg total) by mouth daily   gabapentin (NEURONTIN) 300 mg capsule  Outside Facility (Specify) Yes No   Sig: Take 300 mg by mouth in the morning and 300 mg in the evening    gabapentin (NEURONTIN) 600 MG tablet  Outside Facility (Specify) Yes No   Sig: Take 600 mg by mouth daily at bedtime   hydroxyurea (HYDREA) 500 mg capsule   No No   Sig: Take 1 capsule (500 mg total) by mouth 2 (two) times a day   insulin glargine (LANTUS) 100 units/mL subcutaneous injection  Outside Facility (Specify) No No   Sig: Inject 20 Units under the skin daily at bedtime   insulin lispro (HumaLOG) 100 units/mL injection  Outside Facility (Specify) No No   Sig: Inject 5 Units under the skin 3 (three) times a day with meals   lisinopril (ZESTRIL) 5 mg tablet  Outside Facility (Specify) No No   Sig: Take 1 tablet (5 mg total) by mouth daily   loratadine (CLARITIN) 10 mg tablet  Outside Facility (Specify) Yes No   Sig: Take 10 mg by mouth daily   metoprolol succinate (TOPROL-XL) 25 mg 24 hr tablet  Outside Facility (Specify) No No   Sig: Take 1 tablet (25 mg total) by mouth daily   pantoprazole (PROTONIX) 40 mg tablet  Outside Facility (Specify) Yes No   Sig: Take 40 mg by mouth daily   potassium chloride (K-DUR,KLOR-CON) 20 mEq tablet  Outside Facility (Specify) No No   Sig: Take 2 tablets (40 mEq total) by mouth daily   rOPINIRole (REQUIP) 0 5 mg tablet  Outside Facility (Specify) Yes No   Sig: Take 0 5 mg by mouth daily at bedtime   tamsulosin (FLOMAX) 0 4 mg  Outside Facility (Specify) Yes No   Sig: Take 0 4 mg by mouth daily with dinner      Facility-Administered Medications: None       Past Medical History:   Diagnosis Date    Atrial fibrillation (HCC)     BPH (benign prostatic hyperplasia)     Congestive heart failure (CHF) (HCC)     Diabetes mellitus (HCC)     Hyperlipidemia     Hypertension     Multiple sclerosis (HCC)     Neuropathy     RLS (restless legs syndrome)        History reviewed  No pertinent surgical history  Family History   Problem Relation Age of Onset    Colon cancer Neg Hx     Colon polyps Neg Hx      I have reviewed and agree with the history as documented      E-Cigarette/Vaping    E-Cigarette Use Never User      E-Cigarette/Vaping Substances     Social History     Tobacco Use    Smoking status: Never Smoker    Smokeless tobacco: Never Used Vaping Use    Vaping Use: Never used   Substance Use Topics    Alcohol use: Not Currently    Drug use: Never       Review of Systems   Constitutional: Negative for chills and fever  HENT: Negative for rhinorrhea and sore throat  Respiratory: Positive for cough and shortness of breath  Cardiovascular: Positive for chest pain  Gastrointestinal: Positive for abdominal pain  Negative for constipation, diarrhea, nausea and vomiting  Genitourinary: Negative for dysuria and frequency  Musculoskeletal: Positive for arthralgias  Skin: Negative for rash  Neurological: Positive for weakness  All other systems reviewed and are negative  Physical Exam  Physical Exam  Vitals and nursing note reviewed  Constitutional:       Appearance: He is well-developed  He is ill-appearing  HENT:      Head: Normocephalic and atraumatic  Right Ear: External ear normal       Left Ear: External ear normal       Nose: Nose normal    Eyes:      Conjunctiva/sclera: Conjunctivae normal       Pupils: Pupils are equal, round, and reactive to light  Cardiovascular:      Rate and Rhythm: Normal rate and regular rhythm  Heart sounds: Normal heart sounds  Pulmonary:      Effort: Pulmonary effort is normal  No respiratory distress  Breath sounds: Rhonchi present  No wheezing  Chest:      Chest wall: No tenderness  Abdominal:      General: Abdomen is protuberant  Bowel sounds are normal  There is no distension  Palpations: Abdomen is soft  Tenderness: There is generalized abdominal tenderness  There is no guarding or rebound  Genitourinary:     Rectum: Guaiac result negative  Musculoskeletal:         General: No deformity  Normal range of motion  Cervical back: Normal range of motion and neck supple  No spinous process tenderness  Skin:     General: Skin is warm and dry  Coloration: Skin is pale  Findings: No rash        Comments: Dressing over right sacral decubitus Neurological:      General: No focal deficit present  Mental Status: He is alert  GCS: GCS eye subscore is 4  GCS verbal subscore is 5  GCS motor subscore is 6  Sensory: No sensory deficit     Psychiatric:         Mood and Affect: Mood normal          Vital Signs  ED Triage Vitals   Temperature Pulse Respirations Blood Pressure SpO2   07/04/22 0108 07/04/22 0108 07/04/22 0108 07/04/22 0108 07/04/22 0108   (!) 97 °F (36 1 °C) (!) 138 20 135/75 92 %      Temp Source Heart Rate Source Patient Position - Orthostatic VS BP Location FiO2 (%)   07/04/22 0108 07/04/22 0130 07/04/22 0108 07/04/22 0108 --   Temporal Monitor Lying Right arm       Pain Score       07/04/22 0500       No Pain           Vitals:    07/04/22 0645 07/04/22 0700 07/04/22 0715 07/04/22 0730   BP: 106/55 100/58 110/55 103/59   Pulse: 99 97 99 98   Patient Position - Orthostatic VS:   Lying Lying         Visual Acuity      ED Medications  Medications   sodium chloride (PF) 0 9 % injection 3 mL ( Intravenous Canceled Entry 7/4/22 0546)   aspirin (ECOTRIN LOW STRENGTH) EC tablet 81 mg (has no administration in time range)   atorvastatin (LIPITOR) tablet 40 mg (has no administration in time range)   cholestyramine sugar free (QUESTRAN LIGHT) packet 4 g (has no administration in time range)   clopidogrel (PLAVIX) tablet 75 mg (has no administration in time range)   furosemide (LASIX) injection 20 mg (has no administration in time range)   gabapentin (NEURONTIN) capsule 300 mg (has no administration in time range)   gabapentin (NEURONTIN) capsule 600 mg (has no administration in time range)   insulin glargine (LANTUS) subcutaneous injection 20 Units 0 2 mL (has no administration in time range)   insulin lispro (HumaLOG) 100 units/mL subcutaneous injection 5 Units (5 Units Subcutaneous Given 7/4/22 0732)   lisinopril (ZESTRIL) tablet 5 mg (has no administration in time range)   loratadine (CLARITIN) tablet 10 mg (has no administration in time range)   metoprolol succinate (TOPROL-XL) 24 hr tablet 25 mg (has no administration in time range)   pantoprazole (PROTONIX) EC tablet 40 mg (40 mg Oral Given 7/4/22 0728)   potassium chloride (K-DUR,KLOR-CON) CR tablet 40 mEq (has no administration in time range)   rOPINIRole (REQUIP) tablet 0 5 mg (has no administration in time range)   tamsulosin (FLOMAX) capsule 0 4 mg (has no administration in time range)   baclofen tablet 10 mg (has no administration in time range)   acetaminophen (TYLENOL) tablet 650 mg (has no administration in time range)   ondansetron (ZOFRAN) injection 4 mg (has no administration in time range)   insulin lispro (HumaLOG) 100 units/mL subcutaneous injection 1-6 Units (2 Units Subcutaneous Given 7/4/22 0731)   insulin lispro (HumaLOG) 100 units/mL subcutaneous injection 1-6 Units (has no administration in time range)   magnesium sulfate 2 g/50 mL IVPB (premix) 2 g (2 g Intravenous New Bag 7/4/22 0715)   acetaminophen (TYLENOL) tablet 975 mg (975 mg Oral Given 7/4/22 0129)   magnesium sulfate 2 g/50 mL IVPB (premix) 2 g (0 g Intravenous Stopped 7/4/22 0710)   furosemide (LASIX) injection 40 mg (40 mg Intravenous Given 7/4/22 0400)       Diagnostic Studies  Results Reviewed     Procedure Component Value Units Date/Time    HS Troponin I 4hr [[id number]]  (Abnormal) Collected: 07/04/22 0613    Lab Status: Final result Specimen: Blood from Arm, Right Updated: 07/04/22 0723     hs TnI 4hr 4,556 ng/L      Delta 4hr hsTnI 2,344 ng/L     Lipase [[id number]]  (Abnormal) Collected: 07/04/22 0107    Lab Status: Final result Specimen: Blood from Arm, Left Updated: 07/04/22 0509     Lipase 51 u/L     Lactic acid 2 Hours [[id number]]  (Abnormal) Collected: 07/04/22 0412    Lab Status: Final result Specimen: Blood from Arm, Right Updated: 07/04/22 0454     LACTIC ACID 3 1 mmol/L     Narrative:      Result may be elevated if tourniquet was used during collection      HS Troponin I 2hr [[id number]]  (Abnormal) Collected: 07/04/22 0412    Lab Status: Final result Specimen: Blood from Arm, Right Updated: 07/04/22 0449     hs TnI 2hr 2,791 ng/L      Delta 2hr hsTnI 579 ng/L     UA w Reflex to Microscopic w Reflex to Culture [[id number]] Collected: 07/04/22 0412    Lab Status: Final result Specimen: Urine, Indwelling Hodges Catheter Updated: 07/04/22 0422     Color, UA Yellow     Clarity, UA Clear     Specific Wendel, UA 1 020     pH, UA 5 5     Leukocytes, UA Negative     Nitrite, UA Negative     Protein, UA Negative mg/dl      Glucose, UA Negative mg/dl      Ketones, UA Negative mg/dl      Urobilinogen, UA 0 2 E U /dl      Bilirubin, UA Negative     Occult Blood, UA Negative    HS Troponin 0hr (reflex protocol) [[id number]]  (Abnormal) Collected: 07/04/22 0107    Lab Status: Final result Specimen: Blood from Arm, Left Updated: 07/04/22 0218     hs TnI 0hr 2,212 ng/L     CBC and differential [[id number]]  (Abnormal) Collected: 07/04/22 0107    Lab Status: Final result Specimen: Blood from Arm, Left Updated: 07/04/22 0213     WBC 1 51 Thousand/uL      RBC 2 55 Million/uL      Hemoglobin 7 2 g/dL      Hematocrit 21 8 %      MCV 86 fL      MCH 28 2 pg      MCHC 33 0 g/dL      RDW 19 7 %      MPV 10 2 fL      Platelets 45 Thousands/uL     Narrative: This is an appended report  These results have been appended to a previously verified report      Manual Differential(PHLEBS Do Not Order) [[id number]]  (Abnormal) Collected: 07/04/22 0107    Lab Status: Final result Specimen: Blood from Arm, Left Updated: 07/04/22 0213     Segmented % 56 %      Lymphocytes % 41 %      Monocytes % 1 %      Eosinophils, % 2 %      Basophils % 0 %      Absolute Neutrophils 0 85 Thousand/uL      Lymphocytes Absolute 0 62 Thousand/uL      Monocytes Absolute 0 02 Thousand/uL      Eosinophils Absolute 0 03 Thousand/uL      Basophils Absolute 0 00 Thousand/uL      Total Counted --     RBC Morphology Present     Anisocytosis Present     Hypochromia Present Tear Drop Cells Present     Platelet Estimate Decreased    COVID/FLU/RSV - 2 hour TAT [[id number]]  (Normal) Collected: 07/04/22 0107    Lab Status: Final result Specimen: Nares from Nose Updated: 07/04/22 0201     SARS-CoV-2 Negative     INFLUENZA A PCR Negative     INFLUENZA B PCR Negative     RSV PCR Negative    Narrative:      FOR PEDIATRIC PATIENTS - copy/paste COVID Guidelines URL to browser: [email or web address]    SARS-CoV-2 assay is a Nucleic Acid Amplification assay intended for the  qualitative detection of nucleic acid from SARS-CoV-2 in nasopharyngeal  swabs  Results are for the presumptive identification of SARS-CoV-2 RNA  Positive results are indicative of infection with SARS-CoV-2, the virus  causing COVID-19, but do not rule out bacterial infection or co-infection  with other viruses  Laboratories within the [place] and its  territories are required to report all positive results to the appropriate  public health authorities  Negative results do not preclude SARS-CoV-2  infection and should not be used as the sole basis for treatment or other  patient management decisions  Negative results must be combined with  clinical observations, patient history, and epidemiological information  This test has not been FDA cleared or approved  This test has been authorized by FDA under an Emergency Use Authorization  (EUA)  This test is only authorized for the duration of time the  declaration that circumstances exist justifying the authorization of the  emergency use of an in vitro diagnostic tests for detection of SARS-CoV-2  virus and/or diagnosis of COVID-19 infection under section 564(b)(1) of  the Act, 21 U  S C  933WHZ-3(W)(2), unless the authorization is terminated  or revoked sooner  The test has been validated but independent review by FDA  and CLIA is pending  Test performed using [employer]:  This RT-PCR assay targets N2,  a region unique to SARS-CoV-2  A conserved region in the E-gene was chosen  for pan-Sarbecovirus detection which includes SARS-CoV-2  Procalcitonin [[id number]]  (Normal) Collected: 07/04/22 0107    Lab Status: Final result Specimen: Blood from Arm, Left Updated: 07/04/22 0200     Procalcitonin 0 08 ng/ml     Lactic acid [[id number]]  (Abnormal) Collected: 07/04/22 0107    Lab Status: Final result Specimen: Blood from Arm, Left Updated: 07/04/22 0156     LACTIC ACID 2 1 mmol/L     Narrative:      Result may be elevated if tourniquet was used during collection      Comprehensive metabolic panel [[id number]]  (Abnormal) Collected: 07/04/22 0107    Lab Status: Final result Specimen: Blood from Arm, Left Updated: 07/04/22 0152     Sodium 137 mmol/L      Potassium 4 2 mmol/L      Chloride 103 mmol/L      CO2 22 mmol/L      ANION GAP 12 mmol/L      BUN 29 mg/dL      Creatinine 1 20 mg/dL      Glucose 280 mg/dL      Calcium 9 0 mg/dL      AST 54 U/L      ALT 39 U/L      Alkaline Phosphatase 119 U/L      Total Protein 7 2 g/dL      Albumin 3 7 g/dL      Total Bilirubin 1 30 mg/dL      eGFR 60 ml/min/1 73sq m     Narrative:      [place] guidelines for Chronic Kidney Disease (CKD):     Stage 1 with normal or high GFR (GFR > 90 mL/min/1 73 square meters)    Stage 2 Mild CKD (GFR = 60-89 mL/min/1 73 square meters)    Stage 3A Moderate CKD (GFR = 45-59 mL/min/1 73 square meters)    Stage 3B Moderate CKD (GFR = 30-44 mL/min/1 73 square meters)    Stage 4 Severe CKD (GFR = 15-29 mL/min/1 73 square meters)    Stage 5 End Stage CKD (GFR <15 mL/min/1 73 square meters)  Note: GFR calculation is accurate only with a steady state creatinine    NT-BNP PRO [[id number]]  (Abnormal) Collected: 07/04/22 0107    Lab Status: Final result Specimen: Blood from Arm, Left Updated: 07/04/22 0152     NT-proBNP 1,062 pg/mL     Magnesium [[id number]]  (Abnormal) Collected: 07/04/22 0107    Lab Status: Final result Specimen: Blood from Arm, Left Updated: 07/04/22 0152     Magnesium 1 4 mg/dL     D-dimer, quantitative [[id number]]  (Abnormal) Collected: 07/04/22 0107    Lab Status: Final result Specimen: Blood from Arm, Left Updated: 07/04/22 0144     D-Dimer, Quant 0 68 ug/ml FEU     Narrative: In the evaluation for possible pulmonary embolism, in the appropriate (Well's Score of 4 or less) patient, the age adjusted d-dimer cutoff for this patient can be calculated as:    Age x 0 01 (in ug/mL) for Age-adjusted D-dimer exclusion threshold for a patient over 50 years  Alexia Pack [[id number]]  (Normal) Collected: 07/04/22 0107    Lab Status: Final result Specimen: Blood from Arm, Left Updated: 07/04/22 0138     Protime 14 0 seconds      INR 1 09    APTT [[id number]]  (Normal) Collected: 07/04/22 0107    Lab Status: Final result Specimen: Blood from Arm, Left Updated: 07/04/22 0138     PTT 32 seconds     Blood culture #1 [[id number]] Collected: 07/04/22 0107    Lab Status: In process Specimen: Blood from Arm, Left Updated: 07/04/22 0121    Blood culture #2 [[id number]] Collected: 07/04/22 0107    Lab Status:  In process Specimen: Blood from Arm, Right Updated: 07/04/22 0121                 XR chest portable   ED Interpretation by Thang Ngo DO (07/04 6553)   chf                 Procedures  ECG 12 Lead Documentation Only    Date/Time: 7/4/2022 5:58 AM  Performed by: Thang Ngo DO  Authorized by: Thang Ngo DO     Indications / Diagnosis:  Chest pressure sob  ECG reviewed by me, the ED Provider: yes    Patient location:  ED  Previous ECG:     Previous ECG:  Compared to current    Comparison ECG info:  5-22    Similarity:  No change  Interpretation:     Interpretation: non-specific    Rate:     ECG rate:  113    ECG rate assessment: tachycardic    Rhythm:     Rhythm: sinus tachycardia    Ectopy:     Ectopy: PAC    QRS:     QRS axis:  Left    QRS intervals:  Normal  Conduction:     Conduction: normal    ST segments:     ST segments:  Normal  T waves:     T waves: normal    Q waves:     Q waves:  V1 and V2             ED Course  ED Course as of 07/04/22 0757   Mon Jul 04, 2022   0353 Hemoccult negative  reviewed elevated trop with cardiology Dr Blanche Bentley - agrees with plan to admit and trend troponins  Initial Sepsis Screening     Row Name 07/04/22 4949                Is the patient's history suggestive of a new or worsening infection? No  -STEPHEN        Suspected source of infection --        Are two or more of the following signs & symptoms of infection both present and new to the patient? --        Indicate SIRS criteria --        If the answer is yes to both questions, suspicion of sepsis is present --        If severe sepsis is present AND tissue hypoperfusion perists in the hour after fluid resuscitation or lactate > 4, the patient meets criteria for SEPTIC SHOCK --        Are any of the following organ dysfunction criteria present within 6 hours of suspected infection and SIRS criteria that are NOT considered to be chronic conditions?  --        Organ dysfunction --        Date of presentation of severe sepsis --        Time of presentation of severe sepsis --        Tissue hypoperfusion persists in the hour after crystalloid fluid administration, evidenced, by either: --        Was hypotension present within one hour of the conclusion of crystalloid fluid administration? --        Date of presentation of septic shock --        Time of presentation of septic shock --              User Key  (r) = Recorded By, (t) = Taken By, (c) = Cosigned By    [address] Name Provider Type    [address], DO Physician                              MDM  Number of Diagnoses or Management Options  Acute CHF (congestive heart failure) [facility]): new and requires workup  NSTEMI (non-ST elevated myocardial infarction) [facility]): new and requires workup  Pancytopenia [facility]): new and requires workup     Amount and/or Complexity of Data Reviewed  Clinical lab tests: ordered and reviewed  Tests in the radiology section of CPT®: ordered and reviewed  Obtain history from someone other than the patient: yes  Discuss the patient with other providers: yes    Patient Progress  Patient progress: improved      Disposition  Final diagnoses:   NSTEMI (non-ST elevated myocardial infarction) ([facility] )   Pancytopenia ([facility] )   Acute CHF (congestive heart failure) ([facility] )     Time reflects when diagnosis was documented in both MDM as applicable and the Disposition within this note     Time User Action Codes Description Comment    7/4/2022  3:54 AM Larance Adilia Add [I21 4] [facility]ST [facility]     7/4/2022  3:54 AM Larance Adilia Add [D61 818] Pancytopenia ([facility] )     7/4/2022  3:55 AM Larance Adilia Add [I50 9] Acute CHF (congestive heart failure) [facility])       ED Disposition     ED Disposition   Admit    Condition   Stable    Date/Time   Mon Jul 4, 2022  3:54 AM    Comment   Case was discussed with *HAKAN Phoenix* and the patient's admission status was agreed to be Admission Status: inpatient status to the service of Dr Alvie Dubin**   Follow-up Information    None         Current Discharge Medication List      CONTINUE these medications which have NOT CHANGED    Details   hydroxyurea (HYDREA) 500 mg capsule Take 1 capsule (500 mg total) by mouth 2 (two) times a day  Refills: 0    Associated Diagnoses: Thrombocytosis      acetaminophen (TYLENOL) 325 mg tablet Take 650 mg by mouth every 6 (six) hours as needed for mild pain      aspirin (ECOTRIN LOW STRENGTH) 81 mg EC tablet Take 81 mg by mouth in the morning        atorvastatin (LIPITOR) 40 mg tablet Take 1 tablet (40 mg total) by mouth daily with dinner  Refills: 0    Associated Diagnoses: Type 2 diabetes mellitus with other specified complication, with long-term current use of insulin (HCC)      baclofen 10 mg tablet Take 10 mg by mouth every 6 (six) hours as needed for muscle spasms cholestyramine sugar free (QUESTRAN LIGHT) 4 g packet Take 4 g by mouth 2 (two) times a day      clopidogrel (PLAVIX) 75 mg tablet Take 75 mg by mouth daily      furosemide (LASIX) 40 mg tablet Take 1 tablet (40 mg total) by mouth daily  Refills: 0    Associated Diagnoses: Elevated troponin      gabapentin (NEURONTIN) 300 mg capsule Take 300 mg by mouth in the morning and 300 mg in the evening       gabapentin (NEURONTIN) 600 MG tablet Take 600 mg by mouth daily at bedtime      insulin glargine (LANTUS) 100 units/mL subcutaneous injection Inject 20 Units under the skin daily at bedtime  Qty: 10 mL, Refills: 0    Associated Diagnoses: Type 2 diabetes mellitus with other specified complication, with long-term current use of insulin (formerly [facility])      insulin lispro (HumaLOG) 100 units/mL injection Inject 5 Units under the skin 3 (three) times a day with meals  Refills: 0    Associated Diagnoses: Type 2 diabetes mellitus with other specified complication, with long-term current use of insulin (HCC)      lisinopril (ZESTRIL) 5 mg tablet Take 1 tablet (5 mg total) by mouth daily  Refills: 0    Associated Diagnoses: Primary hypertension      loratadine (CLARITIN) 10 mg tablet Take 10 mg by mouth daily      metoprolol succinate (TOPROL-XL) 25 mg 24 hr tablet Take 1 tablet (25 mg total) by mouth daily  Refills: 0    Associated Diagnoses: Primary hypertension      pantoprazole (PROTONIX) 40 mg tablet Take 40 mg by mouth daily      potassium chloride (K-DUR,KLOR-CON) 20 mEq tablet Take 2 tablets (40 mEq total) by mouth daily  Refills: 0    Associated Diagnoses: Primary hypertension      rOPINIRole (REQUIP) 0 5 mg tablet Take 0 5 mg by mouth daily at bedtime      tamsulosin (FLOMAX) 0 4 mg Take 0 4 mg by mouth daily with dinner             No discharge procedures on file      PDMP Review     None          ED Provider  Electronically Signed by           Phoenix Rocha DO  07/04/22 6987

## 2022-07-04 NOTE — ASSESSMENT & PLAN NOTE
Lab Results   Component Value Date    HGBA1C 5 3 05/22/2022       No results for input(s): POCGLU in the last 72 hours      Blood Sugar Average: Last 72 hrs:  · Home regimen: Lantus 20 units hs, humalog 5 units tid   · SSI

## 2022-07-05 ENCOUNTER — APPOINTMENT (INPATIENT)
Dept: NON INVASIVE DIAGNOSTICS | Facility: HOSPITAL | Age: 72
DRG: 808 | End: 2022-07-05
Payer: MEDICARE

## 2022-07-05 ENCOUNTER — PATIENT OUTREACH (OUTPATIENT)
Dept: CASE MANAGEMENT | Facility: OTHER | Age: 72
End: 2022-07-05

## 2022-07-05 LAB
ABO GROUP BLD BPU: NORMAL
ALBUMIN SERPL BCP-MCNC: 3.5 G/DL (ref 3.5–5)
ALP SERPL-CCNC: 110 U/L (ref 46–116)
ALT SERPL W P-5'-P-CCNC: 40 U/L (ref 12–78)
ANION GAP SERPL CALCULATED.3IONS-SCNC: 7 MMOL/L (ref 4–13)
ANION GAP SERPL CALCULATED.3IONS-SCNC: 9 MMOL/L (ref 4–13)
AORTIC VALVE MEAN VELOCITY: 9.9 M/S
AST SERPL W P-5'-P-CCNC: 79 U/L (ref 5–45)
AV LVOT MEAN GRADIENT: 1 MMHG
AV LVOT PEAK GRADIENT: 2 MMHG
AV MEAN GRADIENT: 4 MMHG
AV PEAK GRADIENT: 7 MMHG
BILIRUB SERPL-MCNC: 1.2 MG/DL (ref 0.2–1)
BPU ID: NORMAL
BUN SERPL-MCNC: 33 MG/DL (ref 5–25)
BUN SERPL-MCNC: 38 MG/DL (ref 5–25)
CA-I BLD-SCNC: 1.19 MMOL/L (ref 1.12–1.32)
CALCIUM SERPL-MCNC: 8.8 MG/DL (ref 8.3–10.1)
CALCIUM SERPL-MCNC: 8.9 MG/DL (ref 8.3–10.1)
CHLORIDE SERPL-SCNC: 101 MMOL/L (ref 100–108)
CHLORIDE SERPL-SCNC: 104 MMOL/L (ref 100–108)
CO2 SERPL-SCNC: 25 MMOL/L (ref 21–32)
CO2 SERPL-SCNC: 28 MMOL/L (ref 21–32)
CREAT SERPL-MCNC: 1.26 MG/DL (ref 0.6–1.3)
CREAT SERPL-MCNC: 1.44 MG/DL (ref 0.6–1.3)
CROSSMATCH: NORMAL
DOP CALC AO VTI: 24.1 CM
DOP CALC LVOT PEAK VEL VTI: 14.2 CM
DOP CALC LVOT PEAK VEL: 0.78 M/S
E WAVE DECELERATION TIME: 215 MS
ERYTHROCYTE [DISTWIDTH] IN BLOOD BY AUTOMATED COUNT: 18.7 % (ref 11.6–15.1)
GFR SERPL CREATININE-BSD FRML MDRD: 48 ML/MIN/1.73SQ M
GFR SERPL CREATININE-BSD FRML MDRD: 57 ML/MIN/1.73SQ M
GLUCOSE SERPL-MCNC: 156 MG/DL (ref 65–140)
GLUCOSE SERPL-MCNC: 167 MG/DL (ref 65–140)
GLUCOSE SERPL-MCNC: 186 MG/DL (ref 65–140)
GLUCOSE SERPL-MCNC: 186 MG/DL (ref 65–140)
GLUCOSE SERPL-MCNC: 196 MG/DL (ref 65–140)
GLUCOSE SERPL-MCNC: 232 MG/DL (ref 65–140)
GLUCOSE SERPL-MCNC: 99 MG/DL (ref 65–140)
HCT VFR BLD AUTO: 23.7 % (ref 36.5–49.3)
HGB BLD-MCNC: 7.9 G/DL (ref 12–17)
LACTATE SERPL-SCNC: 1.6 MMOL/L (ref 0.5–2)
MAGNESIUM SERPL-MCNC: 1.8 MG/DL (ref 1.6–2.6)
MAGNESIUM SERPL-MCNC: 2.3 MG/DL (ref 1.6–2.6)
MCH RBC QN AUTO: 28.5 PG (ref 26.8–34.3)
MCHC RBC AUTO-ENTMCNC: 33.3 G/DL (ref 31.4–37.4)
MCV RBC AUTO: 86 FL (ref 82–98)
MV E'TISSUE VEL-LAT: 8 CM/S
MV E'TISSUE VEL-SEP: 6 CM/S
MV PEAK A VEL: 0.85 M/S
MV PEAK E VEL: 95 CM/S
MV STENOSIS PRESSURE HALF TIME: 63 MS
MV VALVE AREA P 1/2 METHOD: 3.49 CM2
NRBC BLD AUTO-RTO: 0 /100 WBCS
PLATELET # BLD AUTO: 50 THOUSANDS/UL (ref 149–390)
POTASSIUM SERPL-SCNC: 3.8 MMOL/L (ref 3.5–5.3)
POTASSIUM SERPL-SCNC: 3.8 MMOL/L (ref 3.5–5.3)
PROT SERPL-MCNC: 7.1 G/DL (ref 6.4–8.2)
RBC # BLD AUTO: 2.77 MILLION/UL (ref 3.88–5.62)
SL CV LV EF: 40
SODIUM SERPL-SCNC: 135 MMOL/L (ref 136–145)
SODIUM SERPL-SCNC: 139 MMOL/L (ref 136–145)
UNIT DISPENSE STATUS: NORMAL
UNIT PRODUCT CODE: NORMAL
UNIT PRODUCT VOLUME: 350 ML
UNIT RH: NORMAL
WBC # BLD AUTO: 1.8 THOUSAND/UL (ref 4.31–10.16)

## 2022-07-05 PROCEDURE — 80053 COMPREHEN METABOLIC PANEL: CPT | Performed by: HOSPITALIST

## 2022-07-05 PROCEDURE — 93321 DOPPLER ECHO F-UP/LMTD STD: CPT | Performed by: INTERNAL MEDICINE

## 2022-07-05 PROCEDURE — 93005 ELECTROCARDIOGRAM TRACING: CPT

## 2022-07-05 PROCEDURE — 97163 PT EVAL HIGH COMPLEX 45 MIN: CPT

## 2022-07-05 PROCEDURE — 97530 THERAPEUTIC ACTIVITIES: CPT

## 2022-07-05 PROCEDURE — 93308 TTE F-UP OR LMTD: CPT | Performed by: INTERNAL MEDICINE

## 2022-07-05 PROCEDURE — 99232 SBSQ HOSP IP/OBS MODERATE 35: CPT | Performed by: HOSPITALIST

## 2022-07-05 PROCEDURE — 93325 DOPPLER ECHO COLOR FLOW MAPG: CPT | Performed by: INTERNAL MEDICINE

## 2022-07-05 PROCEDURE — 82948 REAGENT STRIP/BLOOD GLUCOSE: CPT

## 2022-07-05 PROCEDURE — 83735 ASSAY OF MAGNESIUM: CPT | Performed by: HOSPITALIST

## 2022-07-05 PROCEDURE — 99232 SBSQ HOSP IP/OBS MODERATE 35: CPT | Performed by: INTERNAL MEDICINE

## 2022-07-05 PROCEDURE — 85027 COMPLETE CBC AUTOMATED: CPT | Performed by: HOSPITALIST

## 2022-07-05 PROCEDURE — 82330 ASSAY OF CALCIUM: CPT | Performed by: PHYSICIAN ASSISTANT

## 2022-07-05 PROCEDURE — 80048 BASIC METABOLIC PNL TOTAL CA: CPT | Performed by: PHYSICIAN ASSISTANT

## 2022-07-05 PROCEDURE — 83735 ASSAY OF MAGNESIUM: CPT | Performed by: PHYSICIAN ASSISTANT

## 2022-07-05 PROCEDURE — C8924 2D TTE W OR W/O FOL W/CON,FU: HCPCS

## 2022-07-05 PROCEDURE — 83605 ASSAY OF LACTIC ACID: CPT | Performed by: PHYSICIAN ASSISTANT

## 2022-07-05 RX ORDER — METOPROLOL TARTRATE 5 MG/5ML
5 INJECTION INTRAVENOUS ONCE
Status: COMPLETED | OUTPATIENT
Start: 2022-07-05 | End: 2022-07-05

## 2022-07-05 RX ORDER — POTASSIUM CHLORIDE 20 MEQ/1
20 TABLET, EXTENDED RELEASE ORAL ONCE
Status: COMPLETED | OUTPATIENT
Start: 2022-07-05 | End: 2022-07-05

## 2022-07-05 RX ORDER — ALBUMIN, HUMAN INJ 5% 5 %
12.5 SOLUTION INTRAVENOUS ONCE
Status: COMPLETED | OUTPATIENT
Start: 2022-07-05 | End: 2022-07-05

## 2022-07-05 RX ORDER — MAGNESIUM SULFATE HEPTAHYDRATE 40 MG/ML
2 INJECTION, SOLUTION INTRAVENOUS ONCE
Status: COMPLETED | OUTPATIENT
Start: 2022-07-05 | End: 2022-07-05

## 2022-07-05 RX ORDER — TORSEMIDE 20 MG/1
20 TABLET ORAL DAILY
Status: DISCONTINUED | OUTPATIENT
Start: 2022-07-06 | End: 2022-07-08 | Stop reason: HOSPADM

## 2022-07-05 RX ORDER — MAGNESIUM HYDROXIDE/ALUMINUM HYDROXICE/SIMETHICONE 120; 1200; 1200 MG/30ML; MG/30ML; MG/30ML
30 SUSPENSION ORAL EVERY 4 HOURS PRN
Status: DISCONTINUED | OUTPATIENT
Start: 2022-07-05 | End: 2022-07-08 | Stop reason: HOSPADM

## 2022-07-05 RX ADMIN — METOPROLOL SUCCINATE 25 MG: 25 TABLET, FILM COATED, EXTENDED RELEASE ORAL at 08:42

## 2022-07-05 RX ADMIN — SODIUM CHLORIDE 3 ML: 9 INJECTION, SOLUTION INTRAMUSCULAR; INTRAVENOUS; SUBCUTANEOUS at 22:00

## 2022-07-05 RX ADMIN — ASPIRIN 81 MG: 81 TABLET, COATED ORAL at 08:29

## 2022-07-05 RX ADMIN — INSULIN GLARGINE 20 UNITS: 100 INJECTION, SOLUTION SUBCUTANEOUS at 21:26

## 2022-07-05 RX ADMIN — INSULIN LISPRO 5 UNITS: 100 INJECTION, SOLUTION INTRAVENOUS; SUBCUTANEOUS at 11:26

## 2022-07-05 RX ADMIN — POTASSIUM CHLORIDE 20 MEQ: 1500 TABLET, EXTENDED RELEASE ORAL at 19:30

## 2022-07-05 RX ADMIN — LORATADINE 10 MG: 10 TABLET ORAL at 08:29

## 2022-07-05 RX ADMIN — MAGNESIUM SULFATE HEPTAHYDRATE 2 G: 40 INJECTION, SOLUTION INTRAVENOUS at 19:30

## 2022-07-05 RX ADMIN — INSULIN LISPRO 1 UNITS: 100 INJECTION, SOLUTION INTRAVENOUS; SUBCUTANEOUS at 11:25

## 2022-07-05 RX ADMIN — GABAPENTIN 300 MG: 300 CAPSULE ORAL at 08:29

## 2022-07-05 RX ADMIN — PANTOPRAZOLE SODIUM 40 MG: 40 TABLET, DELAYED RELEASE ORAL at 08:29

## 2022-07-05 RX ADMIN — INSULIN LISPRO 1 UNITS: 100 INJECTION, SOLUTION INTRAVENOUS; SUBCUTANEOUS at 06:01

## 2022-07-05 RX ADMIN — LISINOPRIL 5 MG: 5 TABLET ORAL at 08:42

## 2022-07-05 RX ADMIN — GABAPENTIN 600 MG: 300 CAPSULE ORAL at 21:28

## 2022-07-05 RX ADMIN — POTASSIUM CHLORIDE 40 MEQ: 1500 TABLET, EXTENDED RELEASE ORAL at 08:29

## 2022-07-05 RX ADMIN — BACLOFEN 10 MG: 10 TABLET ORAL at 18:01

## 2022-07-05 RX ADMIN — FUROSEMIDE 40 MG: 10 INJECTION, SOLUTION INTRAMUSCULAR; INTRAVENOUS at 16:24

## 2022-07-05 RX ADMIN — GABAPENTIN 300 MG: 300 CAPSULE ORAL at 17:31

## 2022-07-05 RX ADMIN — ALBUMIN (HUMAN) 12.5 G: 12.5 INJECTION, SOLUTION INTRAVENOUS at 19:25

## 2022-07-05 RX ADMIN — PERFLUTREN 1.2 ML/MIN: 6.52 INJECTION, SUSPENSION INTRAVENOUS at 15:05

## 2022-07-05 RX ADMIN — ACETAMINOPHEN 650 MG: 325 TABLET, FILM COATED ORAL at 18:00

## 2022-07-05 RX ADMIN — INSULIN LISPRO 5 UNITS: 100 INJECTION, SOLUTION INTRAVENOUS; SUBCUTANEOUS at 08:27

## 2022-07-05 RX ADMIN — FUROSEMIDE 40 MG: 10 INJECTION, SOLUTION INTRAMUSCULAR; INTRAVENOUS at 08:42

## 2022-07-05 RX ADMIN — ROPINIROLE HYDROCHLORIDE 0.5 MG: 1 TABLET, FILM COATED ORAL at 21:28

## 2022-07-05 RX ADMIN — ATORVASTATIN CALCIUM 40 MG: 40 TABLET, FILM COATED ORAL at 16:24

## 2022-07-05 RX ADMIN — INSULIN LISPRO 3 UNITS: 100 INJECTION, SOLUTION INTRAVENOUS; SUBCUTANEOUS at 21:25

## 2022-07-05 RX ADMIN — CHOLESTYRAMINE 4 G: 4 POWDER, FOR SUSPENSION ORAL at 17:32

## 2022-07-05 RX ADMIN — METOPROLOL TARTRATE 5 MG: 1 INJECTION, SOLUTION INTRAVENOUS at 18:05

## 2022-07-05 RX ADMIN — ALUMINA, MAGNESIA, AND SIMETHICONE ORAL SUSPENSION REGULAR STRENGTH 30 ML: 1200; 1200; 120 SUSPENSION ORAL at 20:42

## 2022-07-05 RX ADMIN — CHOLESTYRAMINE 4 G: 4 POWDER, FOR SUSPENSION ORAL at 08:29

## 2022-07-05 RX ADMIN — TAMSULOSIN HYDROCHLORIDE 0.4 MG: 0.4 CAPSULE ORAL at 16:24

## 2022-07-05 NOTE — NURSING NOTE
Pt with c/o B/L UE discomfort and "feeling really hot"  HR noted to be 120-130 on monitor  Fran Fraction AP notified and orders obtained for EKG, IV lopressor and labs  After lopressor was given, pt stated he began to feel improvement of his discomfort  Tylenol also given  Will continue to monitor

## 2022-07-05 NOTE — PLAN OF CARE
Problem: MOBILITY - ADULT  Goal: Maintain or return to baseline ADL function  Description: INTERVENTIONS:  -  Assess patient's ability to carry out ADLs; assess patient's baseline for ADL function and identify physical deficits which impact ability to perform ADLs (bathing, care of mouth/teeth, toileting, grooming, dressing, etc )  - Assess/evaluate cause of self-care deficits   - Assess range of motion  - Assess patient's mobility; develop plan if impaired  - Assess patient's need for assistive devices and provide as appropriate  - Encourage maximum independence but intervene and supervise when necessary  - Involve family in performance of ADLs  - Assess for home care needs following discharge   - Consider OT consult to assist with ADL evaluation and planning for discharge  - Provide patient education as appropriate  Outcome: Progressing  Goal: Maintains/Returns to pre admission functional level  Description: INTERVENTIONS:  - Perform BMAT or MOVE assessment daily    - Set and communicate daily mobility goal to care team and patient/family/caregiver     - Collaborate with rehabilitation services on mobility goals if consulted    - Out of bed for toileting  - Record patient progress and toleration of activity level   Outcome: Progressing     Problem: Potential for Falls  Goal: Patient will remain free of falls  Description: INTERVENTIONS:  - Educate patient/family on patient safety including physical limitations  - Instruct patient to call for assistance with activity   - Consult OT/PT to assist with strengthening/mobility   - Keep Call bell within reach  - Keep bed low and locked with side rails adjusted as appropriate  - Keep care items and personal belongings within reach  - Initiate and maintain comfort rounds  - Make Fall Risk Sign visible to staff    - Apply yellow socks and bracelet for high fall risk patients  - Consider moving patient to room near nurses station  Outcome: Progressing     Problem: PAIN - ADULT  Goal: Verbalizes/displays adequate comfort level or baseline comfort level  Description: Interventions:  - Encourage patient to monitor pain and request assistance  - Assess pain using appropriate pain scale  - Administer analgesics based on type and severity of pain and evaluate response  - Implement non-pharmacological measures as appropriate and evaluate response  - Consider cultural and social influences on pain and pain management  - Notify physician/advanced practitioner if interventions unsuccessful or patient reports new pain  Outcome: Progressing     Problem: INFECTION - ADULT  Goal: Absence or prevention of progression during hospitalization  Description: INTERVENTIONS:  - Assess and monitor for signs and symptoms of infection  - Monitor lab/diagnostic results  - Monitor all insertion sites, i e  indwelling lines, tubes, and drains  - Monitor endotracheal if appropriate and nasal secretions for changes in amount and color  - [place] appropriate cooling/warming therapies per order  - Administer medications as ordered  - Instruct and encourage patient and family to use good hand hygiene technique  - Identify and instruct in appropriate isolation precautions for identified infection/condition  Outcome: Progressing  Goal: Absence of fever/infection during neutropenic period  Description: INTERVENTIONS:  - Monitor WBC    Outcome: Progressing     Problem: SAFETY ADULT  Goal: Maintain or return to baseline ADL function  Description: INTERVENTIONS:  -  Assess patient's ability to carry out ADLs; assess patient's baseline for ADL function and identify physical deficits which impact ability to perform ADLs (bathing, care of mouth/teeth, toileting, grooming, dressing, etc )  - Assess/evaluate cause of self-care deficits   - Assess range of motion  - Assess patient's mobility; develop plan if impaired  - Assess patient's need for assistive devices and provide as appropriate  - Encourage maximum independence but intervene and supervise when necessary  - Involve family in performance of ADLs  - Assess for home care needs following discharge   - Consider OT consult to assist with ADL evaluation and planning for discharge  - Provide patient education as appropriate  Outcome: Progressing  Goal: Maintains/Returns to pre admission functional level  Description: INTERVENTIONS:  - Perform BMAT or MOVE assessment daily    - Set and communicate daily mobility goal to care team and patient/family/caregiver     - Collaborate with rehabilitation services on mobility goals if consulted    - Out of bed for toileting  - Record patient progress and toleration of activity level   Outcome: Progressing  Goal: Patient will remain free of falls  Description: INTERVENTIONS:  - Educate patient/family on patient safety including physical limitations  - Instruct patient to call for assistance with activity   - Consult OT/PT to assist with strengthening/mobility   - Keep Call bell within reach  - Keep bed low and locked with side rails adjusted as appropriate  - Keep care items and personal belongings within reach  - Initiate and maintain comfort rounds  - Make Fall Risk Sign visible to staff    - Apply yellow socks and bracelet for high fall risk patients  - Consider moving patient to room near nurses station  Outcome: Progressing     Problem: DISCHARGE PLANNING  Goal: Discharge to home or other facility with appropriate resources  Description: INTERVENTIONS:  - Identify barriers to discharge w/patient and caregiver  - Arrange for needed discharge resources and transportation as appropriate  - Identify discharge learning needs (meds, wound care, etc )  - Arrange for interpretive services to assist at discharge as needed  - Refer to Case Management Department for coordinating discharge planning if the patient needs post-hospital services based on physician/advanced practitioner order or complex needs related to functional status, cognitive ability, or social support system  Outcome: Progressing     Problem: Knowledge Deficit  Goal: Patient/family/caregiver demonstrates understanding of disease process, treatment plan, medications, and discharge instructions  Description: Complete learning assessment and assess knowledge base    Interventions:  - Provide teaching at level of understanding  - Provide teaching via preferred learning methods  Outcome: Progressing     Problem: CARDIOVASCULAR - ADULT  Goal: Maintains optimal cardiac output and hemodynamic stability  Description: INTERVENTIONS:  - Monitor I/O, vital signs and rhythm  - Monitor for S/S and trends of decreased cardiac output  - Administer and titrate ordered vasoactive medications to optimize hemodynamic stability  - Assess quality of pulses, skin color and temperature  - Assess for signs of decreased coronary artery perfusion  - Instruct patient to report change in severity of symptoms  Outcome: Progressing  Goal: Absence of cardiac dysrhythmias or at baseline rhythm  Description: INTERVENTIONS:  - Continuous cardiac monitoring, vital signs, obtain 12 lead EKG if ordered  - Administer antiarrhythmic and heart rate control medications as ordered  - Monitor electrolytes and administer replacement therapy as ordered  Outcome: Progressing     Problem: METABOLIC, FLUID AND ELECTROLYTES - ADULT  Goal: Electrolytes maintained within normal limits  Description: INTERVENTIONS:  - Monitor labs and assess patient for signs and symptoms of electrolyte imbalances  - Administer electrolyte replacement as ordered  - Monitor response to electrolyte replacements, including repeat lab results as appropriate  - Instruct patient on fluid and nutrition as appropriate  Outcome: Progressing  Goal: Fluid balance maintained  Description: INTERVENTIONS:  - Monitor labs   - Monitor I/O and WT  - Instruct patient on fluid and nutrition as appropriate  - Assess for signs & symptoms of volume excess or deficit  Outcome: Progressing  Goal: Glucose maintained within target range  Description: INTERVENTIONS:  - Monitor Blood Glucose as ordered  - Assess for signs and symptoms of hyperglycemia and hypoglycemia  - Administer ordered medications to maintain glucose within target range  - Assess nutritional intake and initiate nutrition service referral as needed  Outcome: Progressing     Problem: SKIN/TISSUE INTEGRITY - ADULT  Goal: Skin Integrity remains intact(Skin Breakdown Prevention)  Description: Assess:    -Assess extremities for adequate circulation and sensation     Bed Management:  -Have minimal linens on bed & keep smooth, unwrinkled  -Change linens as needed when moist or perspiring  bed      Toileting:  -Offer bedside commode    Skin Care:  -Avoid use of baby powder, tape, friction and shearing, hot water or constrictive clothing  -Relieve pressure over bony prominences using   -Do not massage red bony areas      Outcome: Progressing  Goal: Pressure injury heals and does not worsen  Description: Interventions:  - Implement low air loss mattress or specialty surface (Criteria met)  - Apply silicone foam dressing  hours   - Utilize friction reducing device or surface for transfers   - Consider nutrition services referral as needed  Outcome: Progressing     Problem: HEMATOLOGIC - ADULT  Goal: Maintains hematologic stability  Description: INTERVENTIONS  - Assess for signs and symptoms of bleeding or hemorrhage  - Monitor labs  - Administer supportive blood products/factors as ordered and appropriate  Outcome: Progressing     Problem: Prexisting or High Potential for Compromised Skin Integrity  Goal: Skin integrity is maintained or improved  Description: INTERVENTIONS:  - Identify patients at risk for skin breakdown  - Assess and monitor skin integrity  - Assess and monitor nutrition and hydration status  - Monitor labs   - Assess for incontinence   - Turn and reposition patient  - Assist with mobility/ambulation  - Relieve pressure over bony prominences  - Avoid friction and shearing  - Provide appropriate hygiene as needed including keeping skin clean and dry  - Evaluate need for skin moisturizer/barrier cream  - Collaborate with interdisciplinary team   - Patient/family teaching  - Consider wound care consult   Outcome: Progressing

## 2022-07-05 NOTE — PROGRESS NOTES
General Cardiology   Progress Note -  Team One   Herberth Santamaria 70 y o  male MRN: [id number]    Unit/Bed#: -01 Encounter: [id number]    Assessment:     Acute HFmEF  · Home med: lasix 40 mg QD  · IV lasix 40 mg BID ordered on admit and will continue today and likely transition to PO diuretic tomorrow, likely using torsemide  · I/O: total UO in 24 hrs is 1 8 L; net negative 465 cc  · No weight recorded since admit; weight at last discharge after CHF admit was 196 lbs  · TTE as below; repeat TTE pending per Dr Dimitri Clifford  · Daily weights; strict I/O's; 2 g Na restricted diet with fluid restriction     Elevated troponin   · EKG on admit sinus tachycardia with PACs, rate of 113; nonspecific ST/T-wave changes noted  · hsTn 2,212->2,791->4,556  · Patient's case reviewed with Dr Dimitri Clifford and elevated troponins likely secondary to acute CHF exacerbation; regardless, patient refuses cardiac catheterization and wishes to be medically managed  · Cannot start IV heparin drip secondary to pancytopenia     Cardiomyopathy  · TTE from 5/23: EF 45% (had been 55% in 2017); akinesis of the apical anterior wall and apex; hypokinesis of the mid anterior wall; grade 2 DD; mild TR  · Repeat TTE pending per Dr Dimitri Clifford  · Patient wishes to pursue medical management and does not want to pursue cardiac catheterization  · GDMT: Toprol XL 25 mg QD, lisinopril 5 mg QD and lasix 40 mg QD     Essential HTN  · SBP averaging 100's-110's  · Home meds: Toprol XL 25 mg QD, lisinopril 5 mg QD and lasix 40 mg QD     CAD  · Noted on paperwork from facility; no prior documentation  · GDMT: aspirin 81 mg QD, Plavix 75 mg QD;  Toprol XL 25 mg QD and Lipitor 40 mg QD     CKD stage 3  · Baseline creat 1 3  · Creat on admit 1 2  · Creat this AM is 1 2  · Monitor closely during IV diuresis     DM2  · HgbA1c 5 3  · Management per primary team     pancytopenia  · WBC's 1 5; Hgb 7 2 and PLT 45  · Prior hx of thrombocytosis noted in 2017  · Started on hydroxurea during last admit by Hem/Onc  · 1 unit PRBC's in ED this admit  · Hematology consulted  · On ASA/Plavix as outpatient; Plavix is on hold   · This AM Hgb is 7 9     Multiple sclerosis       Plan/Recommendations:  · IV lasix 40 mg BID ordered on admit and will continue today and likely transition to PO diuretic tomorrow, likely using torsemide  · Continue remainder of home medications    _______________________________________________________________________    Subjective  Patient offers no acute complaints this morning  Review of Systems   Constitutional: Negative for chills, fever and malaise/fatigue  HENT: Negative for congestion  Cardiovascular: Positive for dyspnea on exertion  Negative for chest pain, leg swelling, orthopnea and palpitations  Respiratory: Negative for cough, shortness of breath (no SOB at rest) and wheezing  Endocrine: Negative  Hematologic/Lymphatic: Negative  Skin: Negative  Musculoskeletal: Negative  Gastrointestinal: Negative for bloating, abdominal pain, nausea and vomiting  Genitourinary: Negative  Neurological: Negative for dizziness and light-headedness  Psychiatric/Behavioral: Negative  All other systems reviewed and are negative  Objective:   Vitals: Blood pressure 119/63, pulse 96, temperature 98 6 °F (37 °C), temperature source Oral, resp  rate (!) 27, SpO2 93 %  ,     Wt Readings from Last 3 Encounters:   05/26/22 89 3 kg (196 lb 13 9 oz)   02/25/22 94 4 kg (208 lb 3 2 oz)   11/29/13 93 4 kg (206 lb)        Lab Results   Component Value Date    CREATININE 1 26 07/05/2022    CREATININE 1 20 07/04/2022    CREATININE 1 37 (H) 05/26/2022         There is no height or weight on file to calculate BMI ,     Systolic (77HYE), ENR:033 , Min:98 , TPU:079     Diastolic (52QZQ), RSA:57, Min:54, Max:64          Intake/Output Summary (Last 24 hours) at 7/5/2022 0834  Last data filed at 7/5/2022 0500  Gross per 24 hour   Intake 1155 ml   Output 1550 ml   Net -395 ml     Weight (last 2 days)     None            Telemetry Review:  Sinus rhythm with occasional PACs rates in the 90s        Physical Exam  Vitals reviewed  Constitutional:       General: He is not in acute distress  HENT:      Head: Normocephalic and atraumatic  Mouth/Throat:      Mouth: Mucous membranes are moist    Cardiovascular:      Rate and Rhythm: Normal rate and regular rhythm  Heart sounds: Normal heart sounds, S1 normal and S2 normal  No murmur heard  Pulmonary:      Effort: Pulmonary effort is normal  No respiratory distress  Comments: Scant fine crackles bilateral bases  Abdominal:      General: Bowel sounds are normal  There is no distension  Palpations: Abdomen is soft  Musculoskeletal:      Cervical back: Normal range of motion and neck supple  Right lower leg: No edema  Left lower leg: No edema  Skin:     General: Skin is warm and dry  Neurological:      Mental Status: He is alert and oriented to person, place, and time     Psychiatric:         Mood and Affect: Mood normal          LABORATORY RESULTS      CBC with diff:   Results from last 7 days   Lab Units 07/05/22  0253 07/04/22  1750 07/04/22  1001 07/04/22  0107   WBC Thousand/uL 1 80*  --   --  1 51*   HEMOGLOBIN g/dL 7 9* 8 4* 7 6* 7 2*   HEMATOCRIT % 23 7* 25 1* 23 4* 21 8*   MCV fL 86  --   --  86   PLATELETS Thousands/uL 50*  --   --  45*   MCH pg 28 5  --   --  28 2   MCHC g/dL 33 3  --   --  33 0   RDW % 18 7*  --   --  19 7*   MPV fL  --   --   --  10 2   NRBC AUTO /100 WBCs 0  --   --   --        CMP:  Results from last 7 days   Lab Units 07/05/22  0253 07/04/22  0107   POTASSIUM mmol/L 3 8 4 2   CHLORIDE mmol/L 104 103   CO2 mmol/L 28 22   BUN mg/dL 33* 29*   CREATININE mg/dL 1 26 1 20   CALCIUM mg/dL 8 9 9 0   AST U/L 79* 54*   ALT U/L 40 39   ALK PHOS U/L 110 119*   EGFR ml/min/1 73sq m 57 60       BMP:  Results from last 7 days   Lab Units 07/05/22  0253 07/04/22  0107   POTASSIUM mmol/L 3 8 4  2   CHLORIDE mmol/L 104 103   CO2 mmol/L 28 22   BUN mg/dL 33* 29*   CREATININE mg/dL 1 26 1 20   CALCIUM mg/dL 8 9 9 0       Lab Results   Component Value Date    NTBNP 1,062 (H) 07/04/2022    NTBNP 389 (H) 05/22/2022             Results from last 7 days   Lab Units 07/05/22  0253 07/04/22  0107   MAGNESIUM mg/dL 2 3 1 4*                     Results from last 7 days   Lab Units 07/04/22  0107   INR  1 09       Lipid Profile:   No results found for: CHOL  Lab Results   Component Value Date    HDL 29 (L) 05/24/2022     Lab Results   Component Value Date    LDLCALC 124 (H) 05/24/2022     Lab Results   Component Value Date    TRIG 122 05/24/2022       Cardiac testing:   No results found for this or any previous visit  No results found for this or any previous visit  No results found for this or any previous visit  No valid procedures specified  No results found for this or any previous visit          Meds/Allergies   current meds:   Current Facility-Administered Medications   Medication Dose Route Frequency    acetaminophen (TYLENOL) tablet 650 mg  650 mg Oral Q6H PRN    aspirin (ECOTRIN LOW STRENGTH) EC tablet 81 mg  81 mg Oral Daily    atorvastatin (LIPITOR) tablet 40 mg  40 mg Oral Daily With Dinner    baclofen tablet 10 mg  10 mg Oral Q6H PRN    cholestyramine sugar free (QUESTRAN LIGHT) packet 4 g  4 g Oral BID    furosemide (LASIX) injection 40 mg  40 mg Intravenous BID (diuretic)    gabapentin (NEURONTIN) capsule 300 mg  300 mg Oral BID    gabapentin (NEURONTIN) capsule 600 mg  600 mg Oral HS    insulin glargine (LANTUS) subcutaneous injection 20 Units 0 2 mL  20 Units Subcutaneous HS    insulin lispro (HumaLOG) 100 units/mL subcutaneous injection 1-6 Units  1-6 Units Subcutaneous TID AC    insulin lispro (HumaLOG) 100 units/mL subcutaneous injection 1-6 Units  1-6 Units Subcutaneous HS    insulin lispro (HumaLOG) 100 units/mL subcutaneous injection 5 Units  5 Units Subcutaneous TID With Meals    lisinopril (ZESTRIL) tablet 5 mg  5 mg Oral Daily    loratadine (CLARITIN) tablet 10 mg  10 mg Oral Daily    metoprolol succinate (TOPROL-XL) 24 hr tablet 25 mg  25 mg Oral Daily    ondansetron (ZOFRAN) injection 4 mg  4 mg Intravenous Q6H PRN    pantoprazole (PROTONIX) EC tablet 40 mg  40 mg Oral Daily Before Breakfast    potassium chloride (K-DUR,KLOR-CON) CR tablet 40 mEq  40 mEq Oral Daily    rOPINIRole (REQUIP) tablet 0 5 mg  0 5 mg Oral HS    sodium chloride (PF) 0 9 % injection 3 mL  3 mL Intravenous Q8H [address]    tamsulosin (FLOMAX) capsule 0 4 mg  0 4 mg Oral Daily With Dinner     Medications Prior to Admission   Medication    hydroxyurea (HYDREA) 500 mg capsule    acetaminophen (TYLENOL) 325 mg tablet    aspirin (ECOTRIN LOW STRENGTH) 81 mg EC tablet    atorvastatin (LIPITOR) 40 mg tablet    baclofen 10 mg tablet    cholestyramine sugar free (QUESTRAN LIGHT) 4 g packet    clopidogrel (PLAVIX) 75 mg tablet    furosemide (LASIX) 40 mg tablet    gabapentin (NEURONTIN) 300 mg capsule    gabapentin (NEURONTIN) 600 MG tablet    insulin glargine (LANTUS) 100 units/mL subcutaneous injection    insulin lispro (HumaLOG) 100 units/mL injection    lisinopril (ZESTRIL) 5 mg tablet    loratadine (CLARITIN) 10 mg tablet    metoprolol succinate (TOPROL-XL) 25 mg 24 hr tablet    pantoprazole (PROTONIX) 40 mg tablet    potassium chloride (K-DUR,KLOR-CON) 20 mEq tablet    rOPINIRole (REQUIP) 0 5 mg tablet    tamsulosin (FLOMAX) 0 4 mg            Counseling / Coordination of Care  Total floor / unit time spent today 20 minutes  Greater than 50% of total time was spent with the patient and / or family counseling and / or coordination of care  ** Please Note: Dragon 360 Dictation voice to text software may have been used in the creation of this document   **

## 2022-07-05 NOTE — PLAN OF CARE
Problem: MOBILITY - ADULT  Goal: Maintain or return to baseline ADL function  Description: INTERVENTIONS:  -  Assess patient's ability to carry out ADLs; assess patient's baseline for ADL function and identify physical deficits which impact ability to perform ADLs (bathing, care of mouth/teeth, toileting, grooming, dressing, etc )  - Assess/evaluate cause of self-care deficits   - Assess range of motion  - Assess patient's mobility; develop plan if impaired  - Assess patient's need for assistive devices and provide as appropriate  - Encourage maximum independence but intervene and supervise when necessary  - Involve family in performance of ADLs  - Assess for home care needs following discharge   - Consider OT consult to assist with ADL evaluation and planning for discharge  - Provide patient education as appropriate  Outcome: Progressing  Goal: Maintains/Returns to pre admission functional level  Description: INTERVENTIONS:  - Perform BMAT or MOVE assessment daily    - Set and communicate daily mobility goal to care team and patient/family/caregiver     - Collaborate with rehabilitation services on mobility goals if consulte  - Out of bed for toileting  - Record patient progress and toleration of activity level   Outcome: Progressing     Problem: Potential for Falls  Goal: Patient will remain free of falls  Description: INTERVENTIONS:  - Educate patient/family on patient safety including physical limitations  - Instruct patient to call for assistance with activity   - Consult OT/PT to assist with strengthening/mobility   - Keep Call bell within reach  - Keep bed low and locked with side rails adjusted as appropriate  - Keep care items and personal belongings within reach  - Initiate and maintain comfort rounds  - Make Fall Risk Sign visible to staff  - Apply yellow socks and bracelet for high fall risk patients  - Consider moving patient to room near nurses station  Outcome: Progressing     Problem: PAIN - ADULT  Goal: Verbalizes/displays adequate comfort level or baseline comfort level  Description: Interventions:  - Encourage patient to monitor pain and request assistance  - Assess pain using appropriate pain scale  - Administer analgesics based on type and severity of pain and evaluate response  - Implement non-pharmacological measures as appropriate and evaluate response  - Consider cultural and social influences on pain and pain management  - Notify physician/advanced practitioner if interventions unsuccessful or patient reports new pain  Outcome: Progressing     Problem: INFECTION - ADULT  Goal: Absence or prevention of progression during hospitalization  Description: INTERVENTIONS:  - Assess and monitor for signs and symptoms of infection  - Monitor lab/diagnostic results  - Monitor all insertion sites, i e  indwelling lines, tubes, and drains  - Monitor endotracheal if appropriate and nasal secretions for changes in amount and color  - [place] appropriate cooling/warming therapies per order  - Administer medications as ordered  - Instruct and encourage patient and family to use good hand hygiene technique  - Identify and instruct in appropriate isolation precautions for identified infection/condition  Outcome: Progressing  Goal: Absence of fever/infection during neutropenic period  Description: INTERVENTIONS:  - Monitor WBC    Outcome: Progressing     Problem: SAFETY ADULT  Goal: Maintain or return to baseline ADL function  Description: INTERVENTIONS:  -  Assess patient's ability to carry out ADLs; assess patient's baseline for ADL function and identify physical deficits which impact ability to perform ADLs (bathing, care of mouth/teeth, toileting, grooming, dressing, etc )  - Assess/evaluate cause of self-care deficits   - Assess range of motion  - Assess patient's mobility; develop plan if impaired  - Assess patient's need for assistive devices and provide as appropriate  - Encourage maximum independence but intervene and supervise when necessary  - Involve family in performance of ADLs  - Assess for home care needs following discharge   - Consider OT consult to assist with ADL evaluation and planning for discharge  - Provide patient education as appropriate  Outcome: Progressing  Goal: Maintains/Returns to pre admission functional level  Description: INTERVENTIONS:  - Perform BMAT or MOVE assessment daily    - Set and communicate daily mobility goal to care team and patient/family/caregiver     - Collaborate with rehabilitation services on mobility goals if consulted  - Out of bed for toileting  - Record patient progress and toleration of activity level   Outcome: Progressing  Goal: Patient will remain free of falls  Description: INTERVENTIONS:  - Educate patient/family on patient safety including physical limitations  - Instruct patient to call for assistance with activity   - Consult OT/PT to assist with strengthening/mobility   - Keep Call bell within reach  - Keep bed low and locked with side rails adjusted as appropriate  - Keep care items and personal belongings within reach  - Initiate and maintain comfort rounds  - Make Fall Risk Sign visible to staff  - Apply yellow socks and bracelet for high fall risk patients  - Consider moving patient to room near nurses station  Outcome: Progressing     Problem: DISCHARGE PLANNING  Goal: Discharge to home or other facility with appropriate resources  Description: INTERVENTIONS:  - Identify barriers to discharge w/patient and caregiver  - Arrange for needed discharge resources and transportation as appropriate  - Identify discharge learning needs (meds, wound care, etc )  - Arrange for interpretive services to assist at discharge as needed  - Refer to Case Management Department for coordinating discharge planning if the patient needs post-hospital services based on physician/advanced practitioner order or complex needs related to functional status, cognitive ability, or social support system  Outcome: Progressing     Problem: Knowledge Deficit  Goal: Patient/family/caregiver demonstrates understanding of disease process, treatment plan, medications, and discharge instructions  Description: Complete learning assessment and assess knowledge base    Interventions:  - Provide teaching at level of understanding  - Provide teaching via preferred learning methods  Outcome: Progressing     Problem: CARDIOVASCULAR - ADULT  Goal: Maintains optimal cardiac output and hemodynamic stability  Description: INTERVENTIONS:  - Monitor I/O, vital signs and rhythm  - Monitor for S/S and trends of decreased cardiac output  - Administer and titrate ordered vasoactive medications to optimize hemodynamic stability  - Assess quality of pulses, skin color and temperature  - Assess for signs of decreased coronary artery perfusion  - Instruct patient to report change in severity of symptoms  Outcome: Progressing  Goal: Absence of cardiac dysrhythmias or at baseline rhythm  Description: INTERVENTIONS:  - Continuous cardiac monitoring, vital signs, obtain 12 lead EKG if ordered  - Administer antiarrhythmic and heart rate control medications as ordered  - Monitor electrolytes and administer replacement therapy as ordered  Outcome: Progressing     Problem: METABOLIC, FLUID AND ELECTROLYTES - ADULT  Goal: Electrolytes maintained within normal limits  Description: INTERVENTIONS:  - Monitor labs and assess patient for signs and symptoms of electrolyte imbalances  - Administer electrolyte replacement as ordered  - Monitor response to electrolyte replacements, including repeat lab results as appropriate  - Instruct patient on fluid and nutrition as appropriate  Outcome: Progressing  Goal: Fluid balance maintained  Description: INTERVENTIONS:  - Monitor labs   - Monitor I/O and WT  - Instruct patient on fluid and nutrition as appropriate  - Assess for signs & symptoms of volume excess or deficit  Outcome: Progressing  Goal: Glucose maintained within target range  Description: INTERVENTIONS:  - Monitor Blood Glucose as ordered  - Assess for signs and symptoms of hyperglycemia and hypoglycemia  - Administer ordered medications to maintain glucose within target range  - Assess nutritional intake and initiate nutrition service referral as needed  Outcome: Progressing     Problem: SKIN/TISSUE INTEGRITY - ADULT  Goal: Skin Integrity remains intact(Skin Breakdown Prevention)  Description: Assess:    -Inspect skin when repositioning, toileting, and assisting with ADLS    -Assess extremities for adequate circulation and sensation     Bed Management:  -Have minimal linens on bed & keep smooth, unwrinkled  -Change linens as needed when moist or perspiring  Toileting:  -Offer bedside commode    Activity  -Encourage activity and walks on unit  -Encourage or provide ROM exercises   -Use appropriate equipment to lift or move patient in bed    Skin Care:  -Avoid use of baby powder, tape, friction and shearing, hot water or constrictive clothing  -Do not massage red bony areas    Outcome: Progressing  Goal: Pressure injury heals and does not worsen  Description: Interventions:  - Implement low air loss mattress or specialty surface (Criteria met)  - Apply silicone foam dressing  - Apply fecal or urinary incontinence containment device   - Utilize friction reducing device or surface for transfers   - Consider nutrition services referral as needed  Outcome: Progressing     Problem: HEMATOLOGIC - ADULT  Goal: Maintains hematologic stability  Description: INTERVENTIONS  - Assess for signs and symptoms of bleeding or hemorrhage  - Monitor labs  - Administer supportive blood products/factors as ordered and appropriate  Outcome: Progressing     Problem: Prexisting or High Potential for Compromised Skin Integrity  Goal: Skin integrity is maintained or improved  Description: INTERVENTIONS:  - Identify patients at risk for skin breakdown  - Assess and monitor skin integrity  - Assess and monitor nutrition and hydration status  - Monitor labs   - Assess for incontinence   - Turn and reposition patient  - Assist with mobility/ambulation  - Relieve pressure over bony prominences  - Avoid friction and shearing  - Provide appropriate hygiene as needed including keeping skin clean and dry  - Evaluate need for skin moisturizer/barrier cream  - Collaborate with interdisciplinary team   - Patient/family teaching  - Consider wound care consult   Outcome: Progressing

## 2022-07-05 NOTE — ASSESSMENT & PLAN NOTE
· WBC 1 51, hg 7 2, platelets 45  On admission  · Recent admission platelets had been 0966   Had been started on hydrea   · Hold hydrea   · Received 1 unit prbcs ordered by ER   · No signs of active bleeding   · Consult hematology   · plt improving

## 2022-07-05 NOTE — CASE MANAGEMENT
Case Management Assessment & Discharge Planning Note    Patient name Jennifer Carvalho  Location /-12 MRN [id number]  : 1950 Date 2022       Current Admission Date: 2022  Current Admission Diagnosis:Pancytopenia [facility])   Patient Active Problem List    Diagnosis Date Noted    Pancytopenia ([facility] ) 2022    Hypomagnesemia 2022    Lactic acidosis 2022    PVD (peripheral vascular disease) ([facility] ) 2022    Stage 3 chronic kidney disease, unspecified whether stage 3a or 3b CKD ([facility] ) 2022    Pure hypercholesterolemia 2022    Ischemic cardiomyopathy 2022    Abnormal echocardiogram 2022    Acute on chronic combined systolic and diastolic congestive heart failure ([facility] ) 2022    Acute pulmonary edema ([facility] ) 2022    Acute respiratory failure ([facility] ) 2022    Coronary artery disease involving native coronary artery 2022    Elevated troponin 2022    Type 2 diabetes mellitus, with long-term current use of insulin ([facility] ) 2022    Essential (hemorrhagic) thrombocythemia ([facility] ) 2022    SIRS (systemic inflammatory response syndrome) ([facility] ) 2022    Diarrhea 2022    Multiple sclerosis ([facility] ) 2022    Antiplatelet or antithrombotic long-term use 2022    HTN (hypertension) 2017      LOS (days): 1  Geometric Mean LOS (GMLOS) (days): 3 80  Days to GMLOS:2 5     OBJECTIVE:    Risk of Unplanned Readmission Score: 17 82     Current admission status: Inpatient    Preferred Pharmacy:   [address] [place] [address]  Phone: [phone] Fax: [phone]    Primary Care Provider: Lacie Bhardwaj MD    Primary Insurance: MEDICARE  Secondary Insurance: [facility]    ASSESSMENT:  Active Health Care Proxies    There are no active Health Care Proxies on file           Readmission Root Cause  30 Day Readmission: No    Patient Information  Admitted from[de-identified] Facility [facility])  During Assessment patient was accompanied by: Not accompanied during assessment  Assessment information provided by[de-identified] Spouse  Primary Caregiver: Other (Comment)  Caregiver's Name[de-identified] Luis Manuel 45: Spouse/significant other, Family members  [address] of Residence: [address] [facility] do you live in?: [address] [place] [address] entry access options  Select all that apply : No steps to enter home  Type of Current Residence: Facility  In the last 12 months, was there a time when you were not able to pay the mortgage or rent on time?: No  In the last 12 months, how many places have you lived?: 1  In the last 12 months, was there a time when you did not have a steady place to sleep or slept in a shelter (including now)?: No  Living Arrangements: Other (Comment) ([facility])  Is patient a ?: No    Activities of Daily Living Prior to Admission  Functional Status: Independent  Completes ADLs independently?: No  Level of ADL dependence: Assistance  Ambulates independently?: No  Level of ambulatory dependence: Assistance  Does patient use assisted devices?: Yes  Assisted Devices (DME) used:  Wheelchair, Other (Comment) (lynette lift)  Does patient currently own DME?: Yes  What DME does the patient currently own?: Wheelchair, Other (Comment) (lynette lift)    Patient Information Continued  Income Source: Pension/FDC  Does patient have prescription coverage?: Yes  Within the past 12 months, you worried that your food would run out before you got the money to buy more : Never true  Within the past 12 months, the food you bought just didn't last and you didn't have money to get more : Never true  Food insecurity resource given?: N/A  Does patient have a history of substance abuse?: No  Does patient have a history of Mental Health Diagnosis?: No    Means of Transportation  Means of Transport to Hospitals in [place][de-identified] Other (Comment) [facility])  In the past 12 months, has lack of transportation kept you from medical appointments or from getting medications?: No  In the past 12 months, has lack of transportation kept you from meetings, work, or from getting things needed for daily living?: No  Was application for public transport provided?: N/A    DISCHARGE DETAILS:    Discharge planning discussed with[de-identified] patient's wife     Comments - Freedom of Choice: Pt's wife reports Pt is LTC at [facility] and plan to return to [facility] upon discharge  CM contacted family/caregiver?: Yes    Contacts  Patient Contacts: Gretchencody Loco: wife  Relationship to Patient[de-identified] Family  Contact Method: Phone  Phone Number: [phone]  Reason/Outcome: Emergency Contact, Continuity of Care, Discharge Planning    Additional Comments: Pt is LTC resident at [facility], lynette lift into wheelchair  Needs assistance with adls  Call placed to Pt's wife(Sheela), Pt's wife confirmed Pt is LTC resident and plan to return to [facility] upon discharge  Pt's wife reports Pt's POA  Referral sent to [facility] via [facility]

## 2022-07-05 NOTE — ASSESSMENT & PLAN NOTE
Wt Readings from Last 3 Encounters:   05/26/22 89 3 kg (196 lb 13 9 oz)   02/25/22 94 4 kg (208 lb 3 2 oz)   11/29/13 93 4 kg (206 lb)     · Hypervolemic on exam   · BNP 1062  · Prior ECHO 05/22: EF 91%, grade 2 diastolic dysfunction   · Received 40 mg IV lasix in the ER   Continue with 40 mg IV BID   · Home regimen: 40 mg PO daily   · Monitor I/O and daily weight   · Fluid restriction   · telemetry   · apprec cards help

## 2022-07-05 NOTE — PROGRESS NOTES
Chart review reveals that patient was admitted to [address] [place] [address] on 7/4/22  Initial blood work revealed troponin 2,212 which continued to rise to [address] and then 5,903  ProBNP-1,062  Lactic acid-2 1 and CBC revealed WBC-1 51, RBC-2 55, HGB-7 2 and platelets 45  Patient reported chest pain/presure    EKG- sinus tachycardia  Patient was admitted for continued cardiac workup  Patient was diagnosed as NSTEMI  Patient is scheduled for an ECHO today  Chart review will continue  To determine progress toward D/C and [facility]

## 2022-07-05 NOTE — PROGRESS NOTES
[place]  Progress Note - Hernesto Juares 1950, 70 y o  male MRN: [id number]  Unit/Bed#: -01 Encounter: [id number]  Primary Care Provider: Adela Layne MD   Date and time admitted to hospital: 7/4/2022 12:52 AM    Hypomagnesemia  Assessment & Plan    · Received 2 mg IV mag   · Continue to monitor and replete as needed     Stage 3 chronic kidney disease, unspecified whether stage 3a or 3b CKD [facility])  Assessment & Plan  Lab Results   Component Value Date    EGFR 57 07/05/2022    EGFR 60 07/04/2022    EGFR 51 05/26/2022    CREATININE 1 26 07/05/2022    CREATININE 1 20 07/04/2022    CREATININE 1 37 (H) 05/26/2022   · Cr 1 20   · Baseline appears to be about 1 3  · Continue to monitor     Acute on chronic combined systolic and diastolic congestive heart failure (HCC)  Assessment & Plan  Wt Readings from Last 3 Encounters:   05/26/22 89 3 kg (196 lb 13 9 oz)   02/25/22 94 4 kg (208 lb 3 2 oz)   11/29/13 93 4 kg (206 lb)     · Hypervolemic on exam   · BNP 1062  · Prior ECHO 05/22: EF 44%, grade 2 diastolic dysfunction   · Received 40 mg IV lasix in the ER  Continue with 40 mg IV BID   · Home regimen: 40 mg PO daily   · Monitor I/O and daily weight   · Fluid restriction   · telemetry   · apprec cards help        SIRS (systemic inflammatory response syndrome) ([facility])  Assessment & Plan  · SIRS criteria: tachycardia, WBC 1 51   · Lactic 2 1, 3 1   · Suspect due to poor perfusion from decreased hemoglobin   · Appears fluid overload on exam  Hold off on starting IV fluids   · Procal WNL   · COVID, flu, RSV negative   · UA negative   · Do not suspect acute infection   Patient with new pancytopenia after being started on hydrea       Type 2 diabetes mellitus, with long-term current use of insulin [facility])  Assessment & Plan  Lab Results   Component Value Date    HGBA1C 5 3 05/22/2022       Recent Labs     07/04/22  2119 07/05/22  0549 07/05/22  0814 07/05/22  1108   POCGLU 205* 186* 156* 186* Blood Sugar Average: Last 72 hrs:  · (P) 440 [id number] regimen: Lantus 20 units hs, humalog 5 units tid   · SSI    Elevated troponin  Assessment & Plan  · Presented with CP that had occurred earlier in the day, now resolved   · Troponin 2212, 2791  · EKG: Sinus tachycardia 113   · ER discussed with cardiology who did not recommend transfer for cardiac cath   · Unable to be placed on heparin drip due to pancytopenia  Patient prefers medical management and does not wish to undergo invasive procedures  · Telemetry   · Await echocardiogram    Coronary artery disease involving native coronary artery  Assessment & Plan  · Plavix, BB, statin     HTN (hypertension)  Assessment & Plan  · Lisinopril 5 mg daily, lasix 40 mg finch, metoprolol succinate 25 mg daily     Multiple sclerosis (HCC)  Assessment & Plan  · Home regimen: baclofen and requip  · Chronic leg weakness, baseline     * Pancytopenia (HCC)  Assessment & Plan  · WBC 1 51, hg 7 2, platelets 45  On admission  · Recent admission platelets had been 0831  Had been started on hydrea   · Hold hydrea   · Received 1 unit prbcs ordered by ER   · No signs of active bleeding   · Consult hematology   · plt improving        VTE  Prophylaxis:   Pharmacologic: in place    Patient Centered Rounds: I have performed bedside rounds with nursing staff today  Discussions with Specialists or Other Care Team Provider: case management    Education and Discussions with Family / Patient: pt      Current Length of Stay: 1 day(s)    Current Patient Status: Inpatient        Code Status: Level 3 - DNAR and DNI      Subjective:   Pt seen  States feeling much better  Denies chest pain   States dyspnea better    Patient is seen and examined at bedside  All other ROS are negative      Objective:     Vitals:   Temp (24hrs), Av 2 °F (36 8 °C), Min:97 7 °F (36 5 °C), Max:98 6 °F (37 °C)    Temp:  [97 7 °F (36 5 °C)-98 6 °F (37 °C)] 98 6 °F (37 °C)  HR:  [] 93  Resp: [18-27] 22  BP: ()/(54-63) 100/57  SpO2:  [90 %-96 %] 96 %  There is no height or weight on file to calculate BMI  Input and Output Summary (last 24 hours): Intake/Output Summary (Last 24 hours) at 7/5/2022 1253  Last data filed at 7/5/2022 1101  Gross per 24 hour   Intake 733 ml   Output 1800 ml   Net -1067 ml       Physical Exam:       GEN: No acute distress, comfortable  HEEENT: No JVD, PERRLA, no scleral icterus  RESP: Lungs clear to auscultation bilaterally  CV: RRR, +s1/s2   ABD: SOFT NON TENDER, POSITIVE BOWEL SOUNDS, NO DISTENTION  PSYCH: CALM  NEURO: A X O X 3, NO FOCAL DEFICITS  SKIN: NO RASH  EXTREM: NO EDEMA    Additional Data:     Labs:    Results from last 7 days   Lab Units 07/05/22  0253 07/04/22  1001 07/04/22  0107   WBC Thousand/uL 1 80*  --  1 51*   HEMOGLOBIN g/dL 7 9*   < > 7 2*   HEMATOCRIT % 23 7*   < > 21 8*   PLATELETS Thousands/uL 50*  --  45*   LYMPHO PCT %  --   --  41   MONO PCT %  --   --  1*   EOS PCT %  --   --  2    < > = values in this interval not displayed  Results from last 7 days   Lab Units 07/05/22  0253   SODIUM mmol/L 139   POTASSIUM mmol/L 3 8   CHLORIDE mmol/L 104   CO2 mmol/L 28   BUN mg/dL 33*   CREATININE mg/dL 1 26   ANION GAP mmol/L 7   CALCIUM mg/dL 8 9   ALBUMIN g/dL 3 5   TOTAL BILIRUBIN mg/dL 1 20*   ALK PHOS U/L 110   ALT U/L 40   AST U/L 79*   GLUCOSE RANDOM mg/dL 167*     Results from last 7 days   Lab Units 07/04/22  0107   INR  1 09     Results from last 7 days   Lab Units 07/05/22  1108 07/05/22  0814 07/05/22  0549 07/04/22  2119 07/04/22  1540 07/04/22  1110 07/04/22  0727   POC GLUCOSE mg/dl 186* 156* 186* 205* 199* 309* 214*         Results from last 7 days   Lab Units 07/05/22  0253 07/04/22  1001 07/04/22  0613 07/04/22  0412 07/04/22  0107   LACTIC ACID mmol/L 1 6 3 1* 2 7* 3 1* 2 1*   PROCALCITONIN ng/ml  --   --   --   --  0 08           * I Have Reviewed All Lab Data Listed Above             Imaging:     Results for orders placed during the hospital encounter of 07/04/22    XR chest portable    Narrative  CHEST    INDICATION:   sob  COMPARISON:  Chest radiograph from 5/22/2022 and 8/3/2009  EXAM PERFORMED/VIEWS:  XR CHEST PORTABLE      FINDINGS:    Cardiomediastinal silhouette appears unremarkable  Moderate pulmonary edema  No effusion or pneumothorax  Osseous structures appear within normal limits for patient age  Impression  Moderate pulmonary edema  Workstation performed: [id number]    No results found for this or any previous visit  *I have reviewed all imaging reports listed above      Recent Cultures (last 7 days):     Results from last 7 days   Lab Units 07/04/22  0107   BLOOD CULTURE  No Growth at 24 hrs  No Growth at 24 hrs         Last 24 Hours Medication List:   Current Facility-Administered Medications   Medication Dose Route Frequency Provider Last Rate    acetaminophen  650 mg Oral Q6H PRN Anupama Alstrom, PA-C      aspirin  81 mg Oral Daily Donnalee Alstrom, PA-C      atorvastatin  40 mg Oral Daily With Dinner Anupama Stephenom, PA-C      baclofen  10 mg Oral Q6H PRN Anupama Alstrom, PA-C      cholestyramine sugar free  4 g Oral BID Juanyee Alstrom, PA-C      furosemide  40 mg Intravenous BID (diuretic) Danie Kaye MD      gabapentin  300 mg Oral BID Donbruno Alstrom, PA-RAMÓN      gabapentin  600 mg Oral HS Anupama Alstrom, PA-C      insulin glargine  20 Units Subcutaneous HS Donjoshuaee Alstrom, PA-C      insulin lispro  1-6 Units Subcutaneous TID AC Kathi Phoenix PA-C      insulin lispro  1-6 Units Subcutaneous HS Donbruno Alstrom, PA-C      insulin lispro  5 Units Subcutaneous TID With Meals Anupama Lynch PA-C      lisinopril  5 mg Oral Daily Donnalee Alstrom, PA-C      loratadine  10 mg Oral Daily Donjoshuaee Alstrom, PA-C      metoprolol succinate  25 mg Oral Daily Juanyee Alstrom, PA-C      ondansetron  4 mg Intravenous Q6H PRN Donjoshuaee Alstrom, PA-C      pantoprazole  40 mg Oral Daily Before Breakfast Anupama Lynch, PA-C      potassium chloride  40 mEq Oral Daily Wendie Waite PA-C      rOPINIRole  0 5 mg Oral HS Wendie Waite PA-C      sodium chloride (PF)  3 mL Intravenous Q8H [address] Wendie Waite PA-C      tamsulosin  0 4 mg Oral Daily With Teto George PA-C          Today, Patient Was Seen By: Aida Godoy MD    ** Please Note: Dictation voice to text software may have been used in the creation of this document   **

## 2022-07-05 NOTE — PLAN OF CARE
Problem: PHYSICAL THERAPY ADULT  Goal: Performs mobility at highest level of function for planned discharge setting  See evaluation for individualized goals  Description: Treatment/Interventions: Functional transfer training, LE strengthening/ROM, Therapeutic exercise, Endurance training, Patient/family training, Equipment eval/education, Bed mobility, Compensatory technique education, Continued evaluation, Spoke to nursing, OT          See flowsheet documentation for full assessment, interventions and recommendations  Note: Prognosis: Guarded  Problem List: Decreased strength, Decreased range of motion, Decreased endurance, Decreased coordination, Decreased mobility, Impaired balance  Assessment: Pt is a 70 y o  male who presented to ED 7/4/22 with c/o chest pain and UE numbness  Dx:  CHF, NSTEMI, pancytopenia, SIRS, hypomagnesemia  Comorbidities affecting pt's physical performance at time of assessment include: MS, neuropathy, RLS  Personal factors affecting pt at time of IE include: telemetry, SpO2 pleth, writhing movements, BLE weakness, dysmetria  PLOF and home set up listed above  Upon evaluation: Pt requires mod A for bed mobility, mod A for sit to stand, and D for Zdole transfer bed to chair at bedside  Full objective findings from PT assessment regarding body systems outlined above  Current limitations include impaired balance, decreased endurance/activity tolerance, BLE weakness, ataxia, dysmetria, fall risk  The following objective measures performed on IE also reveal limitations: HR to 130s with sit to/from stand transfer  Pt's clinical presentation is currently unstable/unpredictable seen in pt's presentation of continuous monitoring in ICU, fall risk, and HR     Pt would benefit from continued PT while in hospital and follow up with inpt rehab at D/C to increase strength, balance, endurance, independence with funcitonal mobility to return to PLOF, maximize independence, decrease caregiver burden and improve quality of life  The patient's AM-PAC Basic Mobility Inpatient Short Form Raw Score is 9  A Raw score of less than or equal to 17 suggests the patient may benefit from discharge to post-acute rehabilitation services  Please also refer to the recommendation of the Physical Therapist for safe discharge planning  PT Discharge Recommendation: Post acute rehabilitation services          See flowsheet documentation for full assessment

## 2022-07-05 NOTE — PHYSICAL THERAPY NOTE
PHYSICAL THERAPY Evaluation    Performed at least 2 patient identifiers during session:  Patient Active Problem List   Diagnosis    Diarrhea    Multiple sclerosis ([facility] )    Antiplatelet or antithrombotic long-term use    HTN (hypertension)    Acute pulmonary edema (HCC)    Acute respiratory failure (HCC)    Coronary artery disease involving native coronary artery    Elevated troponin    Type 2 diabetes mellitus, with long-term current use of insulin (HCC)    Essential (hemorrhagic) thrombocythemia (HCC)    SIRS (systemic inflammatory response syndrome) ([facility] )    Pure hypercholesterolemia    Ischemic cardiomyopathy    Abnormal echocardiogram    Acute on chronic combined systolic and diastolic congestive heart failure (HCC)    PVD (peripheral vascular disease) ([facility] )    Stage 3 chronic kidney disease, unspecified whether stage 3a or 3b CKD ([place] [address] )    Pancytopenia ([place] [address] )    Hypomagnesemia    Lactic acidosis       Past Medical History:   Diagnosis Date    Atrial fibrillation (HCC)     BPH (benign prostatic hyperplasia)     Congestive heart failure (CHF) (HCC)     Diabetes mellitus (HCC)     Hyperlipidemia     Hypertension     Multiple sclerosis (HCC)     Neuropathy     RLS (restless legs syndrome)        History reviewed  No pertinent surgical history  07/05/22 0844   PT Last Visit   PT Visit Date 07/05/22   Note Type   Note type Evaluation   Pain Assessment   Pain Assessment Tool 0-10   Pain Score No Pain   Restrictions/Precautions   Weight Bearing Precautions Per Order No   Home Living   Type of Home SNF  (LTC resident of [place] x 12 years)   Home Layout One level   [address]  (pt able to self propel WC)   Additional Comments Pt is LTC resident of [place], STS transfer OOB to [facility], able to propel self in [facility], able to feed self  Prior Function   Level of [place] Needs assistance with IADLs; Needs assistance with ADLs and functional mobility  (assist to get in/out of WC)   Lives With Facility staff   Receives Help From Personal care attendant   ADL Assistance Needs assistance   IADLs Needs assistance   General   Additional Pertinent History Pt with intention tremors/writhing movements when attempting mobility  Family/Caregiver Present No   Cognition   Overall Cognitive Status WFL   Arousal/Participation Cooperative   Orientation Level Oriented X4   Memory Within functional limits   Following Commands Follows one step commands with increased time or repetition   RUE Assessment   RUE Assessment   (ataxia and dysmetria noted, strength appears [facility] )   LUE Assessment   LUE Assessment   (ataxia and dysmetria noted, strength appears [facility] )   RLE Assessment   RLE Assessment   (1+/5 hip flexion/abd/add; 3-/5 knee extension, 1/5 df )   LLE Assessment   LLE Assessment   (1+/5 hip flexion/abd/add; 3-/5 knee extension, 1/5 df )   Bed Mobility   Supine to Sit 3  Moderate assistance   Additional items Assist x 1;HOB elevated; Bedrails; Increased time required;LE management;Verbal cues  (BUE and trunk intention tremors/writhing movements, BUE dysmetria)   Additional Comments Pt remains seated OOB in chair at end of session   Transfers   Sit to Stand 3  Moderate assistance   Additional items Assist x 1; Increased time required;Verbal cues  (pt able to stand by pulling on quick move cross bar)   Stand to Sit 3  Moderate assistance   Additional items Assist x 1; Increased time required;Verbal cues   Stand pivot 1  Dependent  (Used quick move to pivot pt to chair at bedside)   Ambulation/Elevation   Distance nonambulatory at baseline   Balance   Static Sitting Poor   Dynamic Sitting Poor   Static Standing Poor -   Dynamic Standing Poor -   Activity Tolerance   Activity Tolerance Treatment limited secondary to medical complications (Comment)  (HR to 130's with sit to/from stand transfers, nurse present and aware)   Nurse Made Aware [place]   Assessment   Prognosis Guarded   Problem List Decreased strength;Decreased range of motion;Decreased endurance;Decreased coordination;Decreased mobility; Impaired balance   Assessment Pt is a 70 y o  male who presented to ED 7/4/22 with c/o chest pain and UE numbness  Dx:  CHF, NSTEMI, pancytopenia, SIRS, hypomagnesemia  Comorbidities affecting pt's physical performance at time of assessment include: MS, neuropathy, RLS  Personal factors affecting pt at time of IE include: telemetry, SpO2 pleth, writhing movements, BLE weakness, dysmetria  PLOF and home set up listed above  Upon evaluation: Pt requires mod A for bed mobility, mod A for sit to stand, and D for Zdole transfer bed to chair at bedside  Full objective findings from PT assessment regarding body systems outlined above  Current limitations include impaired balance, decreased endurance/activity tolerance, BLE weakness, ataxia, dysmetria, fall risk  The following objective measures performed on IE also reveal limitations: HR to 130s with sit to/from stand transfer  Pt's clinical presentation is currently unstable/unpredictable seen in pt's presentation of continuous monitoring in ICU, fall risk, and HR  Treatment consisted of sitting EOB while working on sitting balance with decreased writhing movement and core stability and sit to/from stand transfers  Pt would benefit from continued PT while in hospital and follow up with inpt rehab at D/C to increase strength, balance, endurance, independence with funcitonal mobility to return to PLOF, maximize independence, decrease caregiver burden and improve quality of life  The patient's AM-PAC Basic Mobility Inpatient Short Form Raw Score is 9  A Raw score of less than or equal to 17 suggests the patient may benefit from discharge to post-acute rehabilitation services  Please also refer to the recommendation of the Physical Therapist for safe discharge planning     Goals   Patient Goals to get back to [facility] Expiration Date 07/19/22   Short Term Goal #1 Pt will be able to demo:  min A for sit to/from supine, min A for sit to/from stand at [facility], mod I to propel EC 150ft with BUE; to return to PLOF  Plan   Treatment/Interventions Functional transfer training;LE strengthening/ROM; Therapeutic exercise; Endurance training;Patient/family training;Equipment eval/education; Bed mobility; Compensatory technique education;Continued evaluation;Spoke to nursing;OT   PT Frequency 2-3x/wk   Recommendation   PT Discharge Recommendation Post acute rehabilitation services   AM-PAC Basic Mobility Inpatient   Turning in Bed Without Bedrails 2   Lying on Back to Sitting on Edge of Flat Bed 2   Moving Bed to Chair 1   Standing Up From Chair 2   Walk in Room 1   Climb 3-5 Stairs 1   Basic Mobility Inpatient Raw Score 9   Turning Head Towards Sound 4   Follow Simple Instructions 3   Low Function Basic Mobility Raw Score 16   Low Function Basic Mobility Standardized Score 25 72   Highest Level Of Mobility   JH-HLM Goal 3: Sit at edge of bed   JH-HLM Achieved 4: Move to chair/commode     Andre Stearns, PT    Patient Name: Joseph Melgoza  NCEXA'M Date: 7/5/2022

## 2022-07-05 NOTE — ASSESSMENT & PLAN NOTE
Lab Results   Component Value Date    HGBA1C 5 3 05/22/2022       Recent Labs     07/04/22  2119 07/05/22  0549 07/05/22  0814 07/05/22  1108   POCGLU 205* 186* 156* 186*       Blood Sugar Average: Last 72 hrs:  · (P) 369 [id number] regimen: Lantus 20 units hs, humalog 5 units tid   · SSI

## 2022-07-05 NOTE — ASSESSMENT & PLAN NOTE
· Presented with CP that had occurred earlier in the day, now resolved   · Troponin 2212, 2791  · EKG: Sinus tachycardia 113   · ER discussed with cardiology who did not recommend transfer for cardiac cath   · Unable to be placed on heparin drip due to pancytopenia  Patient prefers medical management and does not wish to undergo invasive procedures    · Telemetry   · Await echocardiogram

## 2022-07-05 NOTE — ASSESSMENT & PLAN NOTE
Lab Results   Component Value Date    EGFR 57 07/05/2022    EGFR 60 07/04/2022    EGFR 51 05/26/2022    CREATININE 1 26 07/05/2022    CREATININE 1 20 07/04/2022    CREATININE 1 37 (H) 05/26/2022   · Cr 1 20   · Baseline appears to be about 1 3  · Continue to monitor

## 2022-07-06 LAB
ALBUMIN SERPL BCP-MCNC: 3.5 G/DL (ref 3.5–5)
ALP SERPL-CCNC: 100 U/L (ref 46–116)
ALT SERPL W P-5'-P-CCNC: 33 U/L (ref 12–78)
ANION GAP SERPL CALCULATED.3IONS-SCNC: 9 MMOL/L (ref 4–13)
ANISOCYTOSIS BLD QL SMEAR: PRESENT
AST SERPL W P-5'-P-CCNC: 43 U/L (ref 5–45)
ATRIAL RATE: 258 BPM
BASOPHILS # BLD MANUAL: 0 THOUSAND/UL (ref 0–0.1)
BASOPHILS NFR MAR MANUAL: 0 % (ref 0–1)
BILIRUB DIRECT SERPL-MCNC: 0.23 MG/DL (ref 0–0.2)
BILIRUB SERPL-MCNC: 0.9 MG/DL (ref 0.2–1)
BLD SMEAR INTERP: NORMAL
BUN SERPL-MCNC: 40 MG/DL (ref 5–25)
CALCIUM SERPL-MCNC: 8.8 MG/DL (ref 8.3–10.1)
CHLORIDE SERPL-SCNC: 104 MMOL/L (ref 100–108)
CO2 SERPL-SCNC: 26 MMOL/L (ref 21–32)
CREAT SERPL-MCNC: 1.24 MG/DL (ref 0.6–1.3)
EOSINOPHIL # BLD MANUAL: 0.04 THOUSAND/UL (ref 0–0.4)
EOSINOPHIL NFR BLD MANUAL: 2 % (ref 0–6)
ERYTHROCYTE [DISTWIDTH] IN BLOOD BY AUTOMATED COUNT: 18.7 % (ref 11.6–15.1)
GFR SERPL CREATININE-BSD FRML MDRD: 58 ML/MIN/1.73SQ M
GLUCOSE SERPL-MCNC: 145 MG/DL (ref 65–140)
GLUCOSE SERPL-MCNC: 146 MG/DL (ref 65–140)
GLUCOSE SERPL-MCNC: 153 MG/DL (ref 65–140)
GLUCOSE SERPL-MCNC: 166 MG/DL (ref 65–140)
GLUCOSE SERPL-MCNC: 222 MG/DL (ref 65–140)
HCT VFR BLD AUTO: 21.7 % (ref 36.5–49.3)
HCT VFR BLD AUTO: 23.7 % (ref 36.5–49.3)
HCT VFR BLD AUTO: 23.8 % (ref 36.5–49.3)
HGB BLD-MCNC: 7.1 G/DL (ref 12–17)
HGB BLD-MCNC: 7.5 G/DL (ref 12–17)
HGB BLD-MCNC: 7.8 G/DL (ref 12–17)
LYMPHOCYTES # BLD AUTO: 1.38 THOUSAND/UL (ref 0.6–4.47)
LYMPHOCYTES # BLD AUTO: 68 % (ref 14–44)
MCH RBC QN AUTO: 28.2 PG (ref 26.8–34.3)
MCHC RBC AUTO-ENTMCNC: 32.7 G/DL (ref 31.4–37.4)
MCV RBC AUTO: 86 FL (ref 82–98)
METAMYELOCYTES NFR BLD MANUAL: 1 % (ref 0–1)
MONOCYTES # BLD AUTO: 0.06 THOUSAND/UL (ref 0–1.22)
MONOCYTES NFR BLD: 3 % (ref 4–12)
MYELOCYTES NFR BLD MANUAL: 1 % (ref 0–1)
NEUTROPHILS # BLD MANUAL: 0.51 THOUSAND/UL (ref 1.85–7.62)
NEUTS SEG NFR BLD AUTO: 25 % (ref 43–75)
OVALOCYTES BLD QL SMEAR: PRESENT
P AXIS: 266 DEGREES
PLATELET # BLD AUTO: 59 THOUSANDS/UL (ref 149–390)
PLATELET BLD QL SMEAR: ABNORMAL
PMV BLD AUTO: 11.8 FL (ref 8.9–12.7)
POTASSIUM SERPL-SCNC: 3.9 MMOL/L (ref 3.5–5.3)
PROT SERPL-MCNC: 6.7 G/DL (ref 6.4–8.2)
QRS AXIS: 82 DEGREES
QRSD INTERVAL: 70 MS
QT INTERVAL: 302 MS
QTC INTERVAL: 440 MS
RBC # BLD AUTO: 2.52 MILLION/UL (ref 3.88–5.62)
SODIUM SERPL-SCNC: 139 MMOL/L (ref 136–145)
T WAVE AXIS: 110 DEGREES
VENTRICULAR RATE: 128 BPM
WBC # BLD AUTO: 2.03 THOUSAND/UL (ref 4.31–10.16)

## 2022-07-06 PROCEDURE — 85014 HEMATOCRIT: CPT

## 2022-07-06 PROCEDURE — 93010 ELECTROCARDIOGRAM REPORT: CPT | Performed by: INTERNAL MEDICINE

## 2022-07-06 PROCEDURE — 85018 HEMOGLOBIN: CPT

## 2022-07-06 PROCEDURE — 85027 COMPLETE CBC AUTOMATED: CPT | Performed by: HOSPITALIST

## 2022-07-06 PROCEDURE — 99222 1ST HOSP IP/OBS MODERATE 55: CPT | Performed by: NURSE PRACTITIONER

## 2022-07-06 PROCEDURE — 80048 BASIC METABOLIC PNL TOTAL CA: CPT | Performed by: NURSE PRACTITIONER

## 2022-07-06 PROCEDURE — 80076 HEPATIC FUNCTION PANEL: CPT | Performed by: NURSE PRACTITIONER

## 2022-07-06 PROCEDURE — 85007 BL SMEAR W/DIFF WBC COUNT: CPT | Performed by: HOSPITALIST

## 2022-07-06 PROCEDURE — 99232 SBSQ HOSP IP/OBS MODERATE 35: CPT | Performed by: INTERNAL MEDICINE

## 2022-07-06 PROCEDURE — 82948 REAGENT STRIP/BLOOD GLUCOSE: CPT

## 2022-07-06 PROCEDURE — 99232 SBSQ HOSP IP/OBS MODERATE 35: CPT

## 2022-07-06 RX ORDER — RANOLAZINE 500 MG/1
500 TABLET, EXTENDED RELEASE ORAL EVERY 12 HOURS SCHEDULED
Status: DISCONTINUED | OUTPATIENT
Start: 2022-07-06 | End: 2022-07-08 | Stop reason: HOSPADM

## 2022-07-06 RX ORDER — AMIODARONE HYDROCHLORIDE 200 MG/1
200 TABLET ORAL
Status: DISCONTINUED | OUTPATIENT
Start: 2022-07-06 | End: 2022-07-08 | Stop reason: HOSPADM

## 2022-07-06 RX ADMIN — ROPINIROLE HYDROCHLORIDE 0.5 MG: 1 TABLET, FILM COATED ORAL at 21:24

## 2022-07-06 RX ADMIN — SODIUM CHLORIDE 3 ML: 9 INJECTION, SOLUTION INTRAMUSCULAR; INTRAVENOUS; SUBCUTANEOUS at 05:51

## 2022-07-06 RX ADMIN — INSULIN LISPRO 5 UNITS: 100 INJECTION, SOLUTION INTRAVENOUS; SUBCUTANEOUS at 16:21

## 2022-07-06 RX ADMIN — INSULIN LISPRO 1 UNITS: 100 INJECTION, SOLUTION INTRAVENOUS; SUBCUTANEOUS at 21:25

## 2022-07-06 RX ADMIN — INSULIN GLARGINE 20 UNITS: 100 INJECTION, SOLUTION SUBCUTANEOUS at 21:24

## 2022-07-06 RX ADMIN — AMIODARONE HYDROCHLORIDE 200 MG: 200 TABLET ORAL at 17:04

## 2022-07-06 RX ADMIN — ATORVASTATIN CALCIUM 40 MG: 40 TABLET, FILM COATED ORAL at 17:04

## 2022-07-06 RX ADMIN — INSULIN LISPRO 1 UNITS: 100 INJECTION, SOLUTION INTRAVENOUS; SUBCUTANEOUS at 16:21

## 2022-07-06 RX ADMIN — BACLOFEN 10 MG: 10 TABLET ORAL at 21:24

## 2022-07-06 RX ADMIN — LORATADINE 10 MG: 10 TABLET ORAL at 08:52

## 2022-07-06 RX ADMIN — INSULIN LISPRO 2 UNITS: 100 INJECTION, SOLUTION INTRAVENOUS; SUBCUTANEOUS at 11:16

## 2022-07-06 RX ADMIN — GABAPENTIN 300 MG: 300 CAPSULE ORAL at 08:52

## 2022-07-06 RX ADMIN — TORSEMIDE 20 MG: 20 TABLET ORAL at 08:52

## 2022-07-06 RX ADMIN — BACLOFEN 10 MG: 10 TABLET ORAL at 09:35

## 2022-07-06 RX ADMIN — TAMSULOSIN HYDROCHLORIDE 0.4 MG: 0.4 CAPSULE ORAL at 17:04

## 2022-07-06 RX ADMIN — RANOLAZINE 500 MG: 500 TABLET, EXTENDED RELEASE ORAL at 15:43

## 2022-07-06 RX ADMIN — SODIUM CHLORIDE 3 ML: 9 INJECTION, SOLUTION INTRAMUSCULAR; INTRAVENOUS; SUBCUTANEOUS at 21:31

## 2022-07-06 RX ADMIN — METOPROLOL SUCCINATE 25 MG: 25 TABLET, FILM COATED, EXTENDED RELEASE ORAL at 08:52

## 2022-07-06 RX ADMIN — GABAPENTIN 300 MG: 300 CAPSULE ORAL at 17:03

## 2022-07-06 RX ADMIN — INSULIN LISPRO 5 UNITS: 100 INJECTION, SOLUTION INTRAVENOUS; SUBCUTANEOUS at 07:37

## 2022-07-06 RX ADMIN — CHOLESTYRAMINE 4 G: 4 POWDER, FOR SUSPENSION ORAL at 08:52

## 2022-07-06 RX ADMIN — RANOLAZINE 500 MG: 500 TABLET, EXTENDED RELEASE ORAL at 21:31

## 2022-07-06 RX ADMIN — LISINOPRIL 5 MG: 5 TABLET ORAL at 08:52

## 2022-07-06 RX ADMIN — PANTOPRAZOLE SODIUM 40 MG: 40 TABLET, DELAYED RELEASE ORAL at 08:57

## 2022-07-06 RX ADMIN — INSULIN LISPRO 5 UNITS: 100 INJECTION, SOLUTION INTRAVENOUS; SUBCUTANEOUS at 11:17

## 2022-07-06 RX ADMIN — ASPIRIN 81 MG: 81 TABLET, COATED ORAL at 08:52

## 2022-07-06 RX ADMIN — GABAPENTIN 600 MG: 300 CAPSULE ORAL at 21:24

## 2022-07-06 RX ADMIN — POTASSIUM CHLORIDE 40 MEQ: 1500 TABLET, EXTENDED RELEASE ORAL at 08:52

## 2022-07-06 RX ADMIN — SODIUM CHLORIDE 3 ML: 9 INJECTION, SOLUTION INTRAMUSCULAR; INTRAVENOUS; SUBCUTANEOUS at 14:21

## 2022-07-06 NOTE — NURSING NOTE
Around 2000, pt alerted RN that he had a BM  Assisted in log-rolling the pt back and forth to clean him up and get him re-settled  Immediately afterwards, pt HR back in 120s, complaining of B/L arm pain, feeling hot, nauseous, and overall not feeling well  NP Daniela Tao notified and at bedside  Mylanta ordered  Approx 20 min later, pt able to further describe arm pain as tingling and admitted that it is his baseline pain d/t neuropathy  Also admits to nausea improving s/p medication

## 2022-07-06 NOTE — PROGRESS NOTES
General Cardiology   Progress Note -  Team One   Chino Snow 70 y o  male MRN: [id number]    Unit/Bed#: -01 Encounter: [id number]    Assessment:     Acute HFmEF  · Home med: lasix 40 mg QD  · IV lasix 40 mg BID transitioned to PO torsemide 20 mg QD as of this AM  · I/O: total UO in 24 hrs is 2 L; net negative 990 cc in 24 hrs  · Weight this AM is 197 lbs; weight at last discharge after CHF admit was 196 lbs  · TTE as below  · Daily weights; strict I/O's; 2 g Na restricted diet with fluid restriction     Elevated troponin   · EKG on admit sinus tachycardia with PACs, rate of 113; nonspecific ST/T-wave changes noted  · hsTn 2,212->2,791->4,556  · Patient's case reviewed with Dr Vikram Don and elevated troponins likely secondary to acute CHF exacerbation; regardless, patient refuses cardiac catheterization and wishes to be medically managed  · Cannot start IV heparin drip secondary to pancytopenia     Cardiomyopathy  · TTE from yesterday: EF 40% (had been 45% in 5/2022 and 55% in 2017); akinesis of the apical septal, apical inferior walls and apex; hypokinesis of the basal inferior septal, mid anterior septal, mid inferior septal and apical anterior walls; mild-to-moderate MR  · Patient wishes to pursue medical management and does not want to pursue cardiac catheterization  · GDMT: Toprol XL 25 mg QD, lisinopril 5 mg QD and torsemide 20 mg QD    Supraventricular tachycardia  · Since yesterday late afternoon the patient has been having brief episodes of a supraventricular tachycardic rhythm (possibly Atach) with rates up into the 120s  · These episodes are short lived but did last roughly an hour yesterday evening  · At time of exam this AM does reveal this supraventricular tachycardic rhythm with rates in the 110s  · Telemetry will be reviewed with Cardiology attending this afternoon, for now we will continue Toprol XL 25 mg daily     Essential HTN  · SBP averaging 100's-110's  · Home meds: Toprol XL 25 mg QD, lisinopril 5 mg QD and torsemide 20 mg QD     CAD  · Noted on paperwork from facility; no prior documentation  · GDMT: aspirin 81 mg QD, Plavix 75 mg QD; Toprol XL 25 mg QD and Lipitor 40 mg QD     CKD stage 3  · Baseline creat 1 3  · Creat on admit 1 2  · Creat this AM is 1 2     DM2  · HgbA1c 5 3  · Management per primary team     pancytopenia  · WBC's 1 5; Hgb 7 2 and PLT 45  · Prior hx of thrombocytosis noted in 2017  · Started on hydroxurea during last admit by Hem/Onc  · 1 unit PRBC's in ED this admit  · Hematology consulted  · On ASA/Plavix as outpatient; Plavix is on hold   · This AM Hgb is 7 1     Multiple sclerosis       Plan/Recommendations:  · Start PO torsemide 20 mg QD this AM  · Continue remainder of home medications    _______________________________________________________________________    Subjective  Patient notes back pain this morning between his shoulder blades that he feels is related to the way he sitting  He denies any shortness of breath at rest       Review of Systems   Constitutional: Negative for chills, fever and malaise/fatigue  HENT: Negative for congestion  Cardiovascular: Positive for dyspnea on exertion  Negative for chest pain, leg swelling, orthopnea and palpitations  Respiratory: Negative for cough, shortness of breath (no SOB at rest) and wheezing  Endocrine: Negative  Hematologic/Lymphatic: Negative  Skin: Negative  Musculoskeletal: Positive for back pain  Gastrointestinal: Negative for bloating, abdominal pain, nausea and vomiting  Genitourinary: Negative  Neurological: Negative for dizziness and light-headedness  Psychiatric/Behavioral: Negative  All other systems reviewed and are negative  Objective:   Vitals: Blood pressure 110/57, pulse 92, temperature 97 8 °F (36 6 °C), temperature source Oral, resp  rate 22, height 5' 10" (1 778 m), weight 89 8 kg (197 lb 15 6 oz), SpO2 97 %  ,     Wt Readings from Last 3 Encounters:   07/06/22 89 8 kg (197 lb 15 6 oz)   05/26/22 89 3 kg (196 lb 13 9 oz)   02/25/22 94 4 kg (208 lb 3 2 oz)        Lab Results   Component Value Date    CREATININE 1 24 07/06/2022    CREATININE 1 44 (H) 07/05/2022    CREATININE 1 26 07/05/2022         Body mass index is 28 41 kg/m²  ,     Systolic (91DKX), IKP:936 , Min:85 , OYK:566     Diastolic (55DUW), BFM:83, Min:42, Max:59          Intake/Output Summary (Last 24 hours) at 7/6/2022 0821  Last data filed at 7/6/2022 0813  Gross per 24 hour   Intake 1190 ml   Output 2000 ml   Net -810 ml     Weight (last 2 days)     Date/Time Weight    07/06/22 0557 89 8 (197 97)    07/05/22 1324 88 9 (196)    07/05/22 0600 91 3 (201 28)            Telemetry Review:  Sinus rhythm with occasional PACs rates in the 90s        Physical Exam  Vitals reviewed  Constitutional:       General: He is not in acute distress  HENT:      Head: Normocephalic and atraumatic  Mouth/Throat:      Mouth: Mucous membranes are moist    Cardiovascular:      Rate and Rhythm: Normal rate and regular rhythm  Heart sounds: Normal heart sounds, S1 normal and S2 normal  No murmur heard  Pulmonary:      Effort: Pulmonary effort is normal  No respiratory distress  Comments: Scant fine crackles bilateral bases  Abdominal:      General: Bowel sounds are normal  There is no distension  Palpations: Abdomen is soft  Musculoskeletal:      Cervical back: Normal range of motion and neck supple  Right lower leg: No edema  Left lower leg: No edema  Skin:     General: Skin is warm and dry  Neurological:      Mental Status: He is alert and oriented to person, place, and time     Psychiatric:         Mood and Affect: Mood normal          LABORATORY RESULTS      CBC with diff:   Results from last 7 days   Lab Units 07/06/22  0551 07/05/22  0253 07/04/22  1750 07/04/22  1001 07/04/22  0107   WBC Thousand/uL 2 03* 1 80*  --   --  1 51*   HEMOGLOBIN g/dL 7 1* 7 9* 8 4* 7 6* 7 2*   HEMATOCRIT % 21 7* 23 7* 25 1* 23 4* 21 8*   MCV fL 86 86  --   --  86   PLATELETS Thousands/uL 59* 50*  --   --  45*   MCH pg 28 2 28 5  --   --  28 2   MCHC g/dL 32 7 33 3  --   --  33 0   RDW % 18 7* 18 7*  --   --  19 7*   MPV fL 11 8  --   --   --  10 2   NRBC AUTO /100 WBCs  --  0  --   --   --        CMP:  Results from last 7 days   Lab Units 07/06/22  0551 07/05/22  1814 07/05/22  0253 07/04/22  0107   POTASSIUM mmol/L 3 9 3 8 3 8 4 2   CHLORIDE mmol/L 104 101 104 103   CO2 mmol/L 26 25 28 22   BUN mg/dL 40* 38* 33* 29*   CREATININE mg/dL 1 24 1 44* 1 26 1 20   CALCIUM mg/dL 8 8 8 8 8 9 9 0   AST U/L  --   --  79* 54*   ALT U/L  --   --  40 39   ALK PHOS U/L  --   --  110 119*   EGFR ml/min/1 73sq m 58 48 57 60       BMP:  Results from last 7 days   Lab Units 07/06/22  0551 07/05/22 1814 07/05/22 0253 07/04/22  0107   POTASSIUM mmol/L 3 9 3 8 3 8 4 2   CHLORIDE mmol/L 104 101 104 103   CO2 mmol/L 26 25 28 22   BUN mg/dL 40* 38* 33* 29*   CREATININE mg/dL 1 24 1 44* 1 26 1 20   CALCIUM mg/dL 8 8 8 8 8 9 9 0       Lab Results   Component Value Date    NTBNP 1,062 (H) 07/04/2022    NTBNP 389 (H) 05/22/2022             Results from last 7 days   Lab Units 07/05/22  1814 07/05/22  0253 07/04/22  0107   MAGNESIUM mg/dL 1 8 2 3 1 4*                     Results from last 7 days   Lab Units 07/04/22  0107   INR  1 09       Lipid Profile:   No results found for: CHOL  Lab Results   Component Value Date    HDL 29 (L) 05/24/2022     Lab Results   Component Value Date    LDLCALC 124 (H) 05/24/2022     Lab Results   Component Value Date    TRIG 122 05/24/2022       Cardiac testing:   No results found for this or any previous visit  No results found for this or any previous visit  No results found for this or any previous visit  No valid procedures specified  No results found for this or any previous visit          Meds/Allergies   current meds:   Current Facility-Administered Medications   Medication Dose Route Frequency    acetaminophen (TYLENOL) tablet 650 mg  650 mg Oral Q6H PRN    aluminum-magnesium hydroxide-simethicone (MYLANTA) oral suspension 30 mL  30 mL Oral Q4H PRN    aspirin (ECOTRIN LOW STRENGTH) EC tablet 81 mg  81 mg Oral Daily    atorvastatin (LIPITOR) tablet 40 mg  40 mg Oral Daily With Dinner    baclofen tablet 10 mg  10 mg Oral Q6H PRN    cholestyramine sugar free (QUESTRAN LIGHT) packet 4 g  4 g Oral BID    gabapentin (NEURONTIN) capsule 300 mg  300 mg Oral BID    gabapentin (NEURONTIN) capsule 600 mg  600 mg Oral HS    insulin glargine (LANTUS) subcutaneous injection 20 Units 0 2 mL  20 Units Subcutaneous HS    insulin lispro (HumaLOG) 100 units/mL subcutaneous injection 1-6 Units  1-6 Units Subcutaneous TID AC    insulin lispro (HumaLOG) 100 units/mL subcutaneous injection 1-6 Units  1-6 Units Subcutaneous HS    insulin lispro (HumaLOG) 100 units/mL subcutaneous injection 5 Units  5 Units Subcutaneous TID With Meals    lisinopril (ZESTRIL) tablet 5 mg  5 mg Oral Daily    loratadine (CLARITIN) tablet 10 mg  10 mg Oral Daily    metoprolol succinate (TOPROL-XL) 24 hr tablet 25 mg  25 mg Oral Daily    ondansetron (ZOFRAN) injection 4 mg  4 mg Intravenous Q6H PRN    pantoprazole (PROTONIX) EC tablet 40 mg  40 mg Oral Daily Before Breakfast    potassium chloride (K-DUR,KLOR-CON) CR tablet 40 mEq  40 mEq Oral Daily    rOPINIRole (REQUIP) tablet 0 5 mg  0 5 mg Oral HS    sodium chloride (PF) 0 9 % injection 3 mL  3 mL Intravenous Q8H [facility]    tamsulosin (FLOMAX) capsule 0 4 mg  0 4 mg Oral Daily With Dinner    torsemide (DEMADEX) tablet 20 mg  20 mg Oral Daily     Medications Prior to Admission   Medication    acetaminophen (TYLENOL) 325 mg tablet    aspirin (ECOTRIN LOW STRENGTH) 81 mg EC tablet    atorvastatin (LIPITOR) 40 mg tablet    baclofen 10 mg tablet    cholestyramine sugar free (QUESTRAN LIGHT) 4 g packet    clopidogrel (PLAVIX) 75 mg tablet    furosemide (LASIX) 40 mg tablet    gabapentin (NEURONTIN) 300 mg capsule    gabapentin (NEURONTIN) 600 MG tablet    hydroxyurea (HYDREA) 500 mg capsule    insulin glargine (LANTUS) 100 units/mL subcutaneous injection    insulin lispro (HumaLOG) 100 units/mL injection    lisinopril (ZESTRIL) 5 mg tablet    loratadine (CLARITIN) 10 mg tablet    metoprolol succinate (TOPROL-XL) 25 mg 24 hr tablet    pantoprazole (PROTONIX) 40 mg tablet    potassium chloride (K-DUR,KLOR-CON) 20 mEq tablet    rOPINIRole (REQUIP) 0 5 mg tablet    tamsulosin (FLOMAX) 0 4 mg            Counseling / Coordination of Care  Total floor / unit time spent today 20 minutes  Greater than 50% of total time was spent with the patient and / or family counseling and / or coordination of care  ** Please Note: Dragon 360 Dictation voice to text software may have been used in the creation of this document   **

## 2022-07-06 NOTE — ASSESSMENT & PLAN NOTE
· Presented with CP that had occurred earlier in the day, now resolved   · Troponin 2212->2791  · EKG: Sinus tachycardia 113   · ER discussed with cardiology who did not recommend transfer for cardiac cath   · Unable to be placed on heparin drip due to pancytopenia  Patient prefers medical management and does not wish to undergo invasive procedures    · Repeat Echo (7/5): EF 30%, grade 2 diastolic dysfunction  · Continue with Telemetry  · Cardiology following

## 2022-07-06 NOTE — PROGRESS NOTES
[place]  Progress Note - Albertus Bernheim 1950, 70 y o  male MRN: [id number]  Unit/Bed#: -01 Encounter: [id number]  Primary Care Provider: Jeet Horn MD   Date and time admitted to hospital: 7/4/2022 12:52 AM    * Pancytopenia ([address] )  Assessment & Plan  · On admission: WBC 1 51, Hg 7 2  · Recent admission platelets had been 1122  Had been started on hydrea   · Continue to hold hydrea   · S/p 1 unit prbcs ordered by ED  · No signs of active bleeding   · Hgb this morning down to 7 1-> repeat 7 8  · Continue to trend  · Hematology consulted, appreciate recommendations:  · Hemolysis workup negative  Renal function normal  T-bili slightly elevated but improving  · Continue to hold Hydrea  Continue to monitor CBC while admitted  Acute on chronic combined systolic and diastolic congestive heart failure (HCC)  Assessment & Plan  Wt Readings from Last 3 Encounters:   07/06/22 89 8 kg (197 lb 15 6 oz)   05/26/22 89 3 kg (196 lb 13 9 oz)   02/25/22 94 4 kg (208 lb 3 2 oz)     · Mildly volume overloaded   · BNP 1062  · Home regimen: 40 mg PO daily   · Prior ECHO 05/22: EF 33%, grade 2 diastolic dysfunction   · Repeat Echo (7/5): EF 26%, grade 2 diastolic dysfunction  · S/p 40 mg IV lasix in the ED  C/w 40 mg IV BID  · Transitioned to oral diuresis per 4/6  · Monitor I/O and daily weight   · Continue low NA diet and Fluid restriction   · Telemetry reviewed: NSR with HR 80's  Patient with runs of Atrial tachycardia overnight  · Continue to monitor overnight  · Cardiology consulted, appreciate recommendations:  · Found to have drop in EF with regional wall motion abnormalities  Likely representing ischemic cardiomyopathy  · Conservative measures were recommended, and patient did not want to pursue cardiac cath  Continue with medical therapy  Plan to discontinue Lisinopril to up titrate anti-anginal's and beta-blockers if need be      Elevated troponin  Assessment & Plan  · Presented with CP that had occurred earlier in the day, now resolved   · Troponin 2212->2791  · EKG: Sinus tachycardia 113   · ER discussed with cardiology who did not recommend transfer for cardiac cath   · Unable to be placed on heparin drip due to pancytopenia  Patient prefers medical management and does not wish to undergo invasive procedures  · Repeat Echo (7/5): EF 71%, grade 2 diastolic dysfunction  · Continue with Telemetry  · Cardiology following     SIRS (systemic inflammatory response syndrome) (HCC)  Assessment & Plan  · SIRS criteria: Tachycardia, Leukopenia (WBC 1 51)   · Likely related to cardiac arrhyhtmia and pancytopenia  · Lactic 2 1-->3 1   · Suspect due to poor perfusion from decreased hemoglobin   · Appears fluid overload on exam  Hold off on starting IV fluids   · Procal WNL   · COVID/flu/RSV: Negative   · UA: Negative   · Do not suspect acute infection     · Patient with new pancytopenia after being started on hydrea   · Monitor CBC/ Fever curve      Hypomagnesemia  Assessment & Plan  · Monitor/Replete PRN    Stage 3 chronic kidney disease, unspecified whether stage 3a or 3b CKD [facility])  Assessment & Plan  Lab Results   Component Value Date    EGFR 58 07/06/2022    EGFR 48 07/05/2022    EGFR 57 07/05/2022    CREATININE 1 24 07/06/2022    CREATININE 1 44 (H) 07/05/2022    CREATININE 1 26 07/05/2022   · Stable  · Baseline appears to be about 1 3  · Continue to monitor while admitted    Type 2 diabetes mellitus, with long-term current use of insulin [facility])  Assessment & Plan  Lab Results   Component Value Date    HGBA1C 5 3 05/22/2022       Recent Labs     07/05/22  2124 07/06/22  0735 07/06/22  1057 07/06/22  1607   POCGLU 232* 146* 222* 166*       Blood Sugar Average: Last 72 hrs:  · (P) 176 [id number]   · Continue Home regimen:   · Lantus 20 units hs, humalog 5 units tid   · C/w Accu-checks and SSI AC/HS  · Hypoglycemic protocol  · Diabetic diet    Coronary artery disease involving native coronary artery  Assessment & Plan  · Continue Home Medication:  · Aspirin, statin, and BB    HTN (hypertension)  Assessment & Plan  · Intermittent Hypotension  · Home Medication:  · Lisinopril 5 mg daily, lasix 40 mg finch, and metoprolol succinate 25 mg daily   · Adjust Hold parameters  · S/p IV Albumin overnight  · Cardiology following    Multiple sclerosis (HCC)  Assessment & Plan  · Continue Home regimen:   · Baclofen and requip  · Chronic leg weakness, baseline   · Bed bound at baseline      VTE Pharmacologic Prophylaxis: VTE Score: 3 Moderate Risk (Score 3-4) - Pharmacological DVT Prophylaxis Contraindicated  Sequential Compression Devices Ordered  Patient Centered Rounds: I performed bedside rounds with nursing staff today  Discussions with Specialists or Other Care Team Provider: Hematology/Oncology    Education and Discussions with Family / Patient: Attempted to update  (wife) via phone  Left voicemail  Time Spent for Care: 30 minutes  More than 50% of total time spent on counseling and coordination of care as described above  Current Length of Stay: 2 day(s)  Current Patient Status: Inpatient   Certification Statement: The patient will continue to require additional inpatient hospital stay due to pancytopenia and cardiac arrythmia requirng close monitoring and further intervention  Discharge Plan: Anticipate discharge in 48-72 hrs to prior LTC facility    Code Status: Level 3 - DNAR and DNI    Subjective:   Patient stated he feels alright  Patient stated he has pain between his shoulder blades  Patient stated he is feeling a little tired and having this new abdominal pain  Patient stated he is feeling pretty constipated  Patient denies any chest pain, nausea, or vomiting       Objective:     Vitals:   Temp (24hrs), Av °F (36 7 °C), Min:97 7 °F (36 5 °C), Max:98 5 °F (36 9 °C)    Temp:  [97 7 °F (36 5 °C)-98 5 °F (36 9 °C)] 98 5 °F (36 9 °C)  HR:  [] 96  Resp: [19-27] 20  BP: ()/(42-59) 98/54  SpO2:  [89 %-98 %] 95 %  Body mass index is 28 41 kg/m²  Input and Output Summary (last 24 hours): Intake/Output Summary (Last 24 hours) at 7/6/2022 1755  Last data filed at 7/6/2022 1618  Gross per 24 hour   Intake 1263 ml   Output 1800 ml   Net -537 ml       Physical Exam:   Physical Exam  Vitals and nursing note reviewed  Constitutional:       General: He is not in acute distress  Appearance: He is ill-appearing (Chronically)  HENT:      Head: Normocephalic  Nose: Nose normal  No congestion  Mouth/Throat:      Mouth: Mucous membranes are dry  Pharynx: Oropharynx is clear  Cardiovascular:      Rate and Rhythm: Normal rate and regular rhythm  Pulses: Normal pulses  Heart sounds: No murmur heard  Pulmonary:      Effort: Pulmonary effort is normal  No respiratory distress  Abdominal:      General: Bowel sounds are normal  There is no distension  Palpations: Abdomen is soft  Tenderness: There is abdominal tenderness  Musculoskeletal:         General: Normal range of motion  Cervical back: Normal range of motion  Right lower leg: No edema  Left lower leg: No edema  Skin:     General: Skin is warm and dry  Capillary Refill: Capillary refill takes less than 2 seconds  Coloration: Skin is pale  Neurological:      Mental Status: He is alert and oriented to person, place, and time  Mental status is at baseline  Motor: Weakness (Generalized  RLE >LLE) and tremor (B/L UE) present  Psychiatric:         Mood and Affect: Mood normal          Speech: Speech is slurred (Stutters at baseline)  Behavior: Behavior is cooperative           Judgment: Judgment normal           Additional Data:     Labs:  Results from last 7 days   Lab Units 07/06/22  1213 07/06/22  0551   WBC Thousand/uL  --  2 03*   HEMOGLOBIN g/dL 7 8* 7 1*   HEMATOCRIT % 23 8* 21 7*   PLATELETS Thousands/uL  --  59*   LYMPHO PCT %  --  68*   MONO PCT %  --  3*   EOS PCT %  --  2     Results from last 7 days   Lab Units 07/06/22  0551   SODIUM mmol/L 139   POTASSIUM mmol/L 3 9   CHLORIDE mmol/L 104   CO2 mmol/L 26   BUN mg/dL 40*   CREATININE mg/dL 1 24   ANION GAP mmol/L 9   CALCIUM mg/dL 8 8   ALBUMIN g/dL 3 5   TOTAL BILIRUBIN mg/dL 0 90   ALK PHOS U/L 100   ALT U/L 33   AST U/L 43   GLUCOSE RANDOM mg/dL 145*     Results from last 7 days   Lab Units 07/04/22  0107   INR  1 09     Results from last 7 days   Lab Units 07/06/22  1607 07/06/22  1057 07/06/22  0735 07/05/22  2124 07/05/22  1618 07/05/22  1108 07/05/22  0814 07/05/22  0549 07/04/22  2119 07/04/22  1540 07/04/22  1110 07/04/22  0727   POC GLUCOSE mg/dl 166* 222* 146* 232* 99 186* 156* 186* 205* 199* 309* 214*         Results from last 7 days   Lab Units 07/05/22  0253 07/04/22  1001 07/04/22  0613 07/04/22  0412 07/04/22  0107   LACTIC ACID mmol/L 1 6 3 1* 2 7* 3 1* 2 1*   PROCALCITONIN ng/ml  --   --   --   --  0 08       Lines/Drains:  Invasive Devices  Report    Peripheral Intravenous Line  Duration           Peripheral IV 07/04/22 Left Arm 2 days    Peripheral IV 07/04/22 Right Antecubital 2 days          Drain  Duration           Urethral Catheter 2 days              Urinary Catheter:  Goal for removal: No longer needed  Will place order to discontinue           Telemetry:  Telemetry Orders (From admission, onward)             48 Hour Telemetry Monitoring  Continuous x 48 hours        References:    Telemetry Guidelines   Question:  Reason for 48 Hour Telemetry  Answer:  Acute MI, chest pain - R/O MI, or unstable angina                 Telemetry Reviewed: Normal Sinus Rhythm  Indication for Continued Telemetry Use: Arrthymias requiring medical therapy             Imaging: Reviewed radiology reports from this admission including: chest xray    Recent Cultures (last 7 days):   Results from last 7 days   Lab Units 07/04/22  0107   BLOOD CULTURE  No Growth at 48 hrs    No Growth at 48 hrs  Last 24 Hours Medication List:   Current Facility-Administered Medications   Medication Dose Route Frequency Provider Last Rate    acetaminophen  650 mg Oral Q6H PRN Fortino Barron PA-C      aluminum-magnesium hydroxide-simethicone  30 mL Oral Q4H PRN CATARINO Paige      amiodarone  200 mg Oral TID With Meals CATARINO Shea      aspirin  81 mg Oral Daily Fortino Barron PA-C      atorvastatin  40 mg Oral Daily With Dinner Fortino Barron PA-C      baclofen  10 mg Oral Q6H PRN Fortino Barron PA-C      cholestyramine sugar free  4 g Oral BID Fortino Barron PA-C      gabapentin  300 mg Oral BID Fortino Barron PA-C      gabapentin  600 mg Oral HS Fortino Barron PA-C      insulin glargine  20 Units Subcutaneous HS Fortino Barron PA-C      insulin lispro  1-6 Units Subcutaneous TID AC Kathi Phoenix PA-C      insulin lispro  1-6 Units Subcutaneous HS Fortino Barron PA-C      insulin lispro  5 Units Subcutaneous TID With Meals Fortino Barron PA-C      loratadine  10 mg Oral Daily Fortino Barron PA-C      metoprolol succinate  25 mg Oral Daily Fortino Barron PA-C      ondansetron  4 mg Intravenous Q6H PRN Fortino Barron PA-C      pantoprazole  40 mg Oral Daily Before Breakfast Fortino Barron PA-C      potassium chloride  40 mEq Oral Daily Foritno Barron PA-C      ranolazine  500 mg Oral Q12H [address] CATARINO Shea      rOPINIRole  0 5 mg Oral HS Fortino Barron PA-C      sodium chloride (PF)  3 mL Intravenous [facility] Fortino Barron PA-C      tamsulosin  0 4 mg Oral Daily With Dinner Fortino Barron PA-C      torsemide  20 mg Oral Daily CATARINO Shea          Today, Patient Was Seen By: CATARINO Grissom    **Please Note: This note may have been constructed using a voice recognition system  **

## 2022-07-06 NOTE — PLAN OF CARE
Problem: MOBILITY - ADULT  Goal: Maintain or return to baseline ADL function  Description: INTERVENTIONS:  -  Assess patient's ability to carry out ADLs; assess patient's baseline for ADL function and identify physical deficits which impact ability to perform ADLs (bathing, care of mouth/teeth, toileting, grooming, dressing, etc )  - Assess/evaluate cause of self-care deficits   - Assess range of motion  - Assess patient's mobility; develop plan if impaired  - Assess patient's need for assistive devices and provide as appropriate  - Encourage maximum independence but intervene and supervise when necessary  - Involve family in performance of ADLs  - Assess for home care needs following discharge   - Consider OT consult to assist with ADL evaluation and planning for discharge  - Provide patient education as appropriate  Outcome: Progressing  Goal: Maintains/Returns to pre admission functional level  Description: INTERVENTIONS:  - Perform BMAT or MOVE assessment daily    - Set and communicate daily mobility goal to care team and patient/family/caregiver  - Collaborate with rehabilitation services on mobility goals if consulted  - Perform Range of Motion 4 times a day  - Reposition patient every 2 hours    - Dangle patient 3 times a day  - Stand patient 3 times a day  - Ambulate patient 3 times a day  - Out of bed to chair 3 times a day   - Out of bed for meals 3 times a day  - Out of bed for toileting  - Record patient progress and toleration of activity level   Outcome: Progressing     Problem: Potential for Falls  Goal: Patient will remain free of falls  Description: INTERVENTIONS:  - Educate patient/family on patient safety including physical limitations  - Instruct patient to call for assistance with activity   - Consult OT/PT to assist with strengthening/mobility   - Keep Call bell within reach  - Keep bed low and locked with side rails adjusted as appropriate  - Keep care items and personal belongings within reach  - Initiate and maintain comfort rounds  - Make Fall Risk Sign visible to staff  - Offer Toileting every 2 Hours, in advance of need  - Initiate/Maintain bed and chair alarm  - Obtain necessary fall risk management equipment  - Apply yellow socks and bracelet for high fall risk patients  - Consider moving patient to room near nurses station  Outcome: Progressing     Problem: PAIN - ADULT  Goal: Verbalizes/displays adequate comfort level or baseline comfort level  Description: Interventions:  - Encourage patient to monitor pain and request assistance  - Assess pain using appropriate pain scale  - Administer analgesics based on type and severity of pain and evaluate response  - Implement non-pharmacological measures as appropriate and evaluate response  - Consider cultural and social influences on pain and pain management  - Notify physician/advanced practitioner if interventions unsuccessful or patient reports new pain  Outcome: Progressing     Problem: INFECTION - ADULT  Goal: Absence or prevention of progression during hospitalization  Description: INTERVENTIONS:  - Assess and monitor for signs and symptoms of infection  - Monitor lab/diagnostic results  - Monitor all insertion sites, i e  indwelling lines, tubes, and drains  - Monitor endotracheal if appropriate and nasal secretions for changes in amount and color  - [place] appropriate cooling/warming therapies per order  - Administer medications as ordered  - Instruct and encourage patient and family to use good hand hygiene technique  - Identify and instruct in appropriate isolation precautions for identified infection/condition  Outcome: Progressing  Goal: Absence of fever/infection during neutropenic period  Description: INTERVENTIONS:  - Monitor WBC    Outcome: Progressing     Problem: SAFETY ADULT  Goal: Maintain or return to baseline ADL function  Description: INTERVENTIONS:  -  Assess patient's ability to carry out ADLs; assess patient's baseline for ADL function and identify physical deficits which impact ability to perform ADLs (bathing, care of mouth/teeth, toileting, grooming, dressing, etc )  - Assess/evaluate cause of self-care deficits   - Assess range of motion  - Assess patient's mobility; develop plan if impaired  - Assess patient's need for assistive devices and provide as appropriate  - Encourage maximum independence but intervene and supervise when necessary  - Involve family in performance of ADLs  - Assess for home care needs following discharge   - Consider OT consult to assist with ADL evaluation and planning for discharge  - Provide patient education as appropriate  Outcome: Progressing  Goal: Maintains/Returns to pre admission functional level  Description: INTERVENTIONS:  - Perform BMAT or MOVE assessment daily    - Set and communicate daily mobility goal to care team and patient/family/caregiver  - Collaborate with rehabilitation services on mobility goals if consulted  - Perform Range of Motion 4 times a day  - Reposition patient every 2 hours    - Dangle patient 3 times a day  - Stand patient 3 times a day  - Ambulate patient 3 times a day  - Out of bed to chair 3 times a day   - Out of bed for meals 3 times a day  - Out of bed for toileting  - Record patient progress and toleration of activity level   Outcome: Progressing  Goal: Patient will remain free of falls  Description: INTERVENTIONS:  - Educate patient/family on patient safety including physical limitations  - Instruct patient to call for assistance with activity   - Consult OT/PT to assist with strengthening/mobility   - Keep Call bell within reach  - Keep bed low and locked with side rails adjusted as appropriate  - Keep care items and personal belongings within reach  - Initiate and maintain comfort rounds  - Make Fall Risk Sign visible to staff  - Offer Toileting every 2 Hours, in advance of need  - Initiate/Maintain bed and chair alarm  - Obtain necessary fall risk management equipment  - Apply yellow socks and bracelet for high fall risk patients  - Consider moving patient to room near nurses station  Outcome: Progressing     Problem: DISCHARGE PLANNING  Goal: Discharge to home or other facility with appropriate resources  Description: INTERVENTIONS:  - Identify barriers to discharge w/patient and caregiver  - Arrange for needed discharge resources and transportation as appropriate  - Identify discharge learning needs (meds, wound care, etc )  - Arrange for interpretive services to assist at discharge as needed  - Refer to Case Management Department for coordinating discharge planning if the patient needs post-hospital services based on physician/advanced practitioner order or complex needs related to functional status, cognitive ability, or social support system  Outcome: Progressing     Problem: Knowledge Deficit  Goal: Patient/family/caregiver demonstrates understanding of disease process, treatment plan, medications, and discharge instructions  Description: Complete learning assessment and assess knowledge base    Interventions:  - Provide teaching at level of understanding  - Provide teaching via preferred learning methods  Outcome: Progressing     Problem: CARDIOVASCULAR - ADULT  Goal: Maintains optimal cardiac output and hemodynamic stability  Description: INTERVENTIONS:  - Monitor I/O, vital signs and rhythm  - Monitor for S/S and trends of decreased cardiac output  - Administer and titrate ordered vasoactive medications to optimize hemodynamic stability  - Assess quality of pulses, skin color and temperature  - Assess for signs of decreased coronary artery perfusion  - Instruct patient to report change in severity of symptoms  Outcome: Progressing  Goal: Absence of cardiac dysrhythmias or at baseline rhythm  Description: INTERVENTIONS:  - Continuous cardiac monitoring, vital signs, obtain 12 lead EKG if ordered  - Administer antiarrhythmic and heart rate control medications as ordered  - Monitor electrolytes and administer replacement therapy as ordered  Outcome: Progressing     Problem: METABOLIC, FLUID AND ELECTROLYTES - ADULT  Goal: Electrolytes maintained within normal limits  Description: INTERVENTIONS:  - Monitor labs and assess patient for signs and symptoms of electrolyte imbalances  - Administer electrolyte replacement as ordered  - Monitor response to electrolyte replacements, including repeat lab results as appropriate  - Instruct patient on fluid and nutrition as appropriate  Outcome: Progressing  Goal: Fluid balance maintained  Description: INTERVENTIONS:  - Monitor labs   - Monitor I/O and WT  - Instruct patient on fluid and nutrition as appropriate  - Assess for signs & symptoms of volume excess or deficit  Outcome: Progressing  Goal: Glucose maintained within target range  Description: INTERVENTIONS:  - Monitor Blood Glucose as ordered  - Assess for signs and symptoms of hyperglycemia and hypoglycemia  - Administer ordered medications to maintain glucose within target range  - Assess nutritional intake and initiate nutrition service referral as needed  Outcome: Progressing     Problem: SKIN/TISSUE INTEGRITY - ADULT  Goal: Skin Integrity remains intact(Skin Breakdown Prevention)  Description: Assess:  -Perform Yovani assessment every shift  -Clean and moisturize skin every shift  -Inspect skin when repositioning, toileting, and assisting with ADLS  -Assess under medical devices such as SCDs every shift  -Assess extremities for adequate circulation and sensation     Bed Management:  -Have minimal linens on bed & keep smooth, unwrinkled  -Change linens as needed when moist or perspiring  -Avoid sitting or lying in one position for more than 2 hours while in bed  -Keep HOB at 30degrees     Toileting:  -Offer bedside commode  -Assess for incontinence every shift  -Use incontinent care products after each incontinent episode     Activity:  -Mobilize patient 3 times a day  -Encourage activity and walks on unit  -Encourage or provide ROM exercises   -Turn and reposition patient every 2 Hours  -Use appropriate equipment to lift or move patient in bed  -Instruct/ Assist with weight shifting every 2 when out of bed in chair  -Consider limitation of chair time 2 hour intervals    Skin Care:  -Avoid use of baby powder, tape, friction and shearing, hot water or constrictive clothing  -Relieve pressure over bony prominences   -Do not massage red bony areas    Next Steps:  -Teach patient strategies to minimize risks    -Consider consults to  interdisciplinary teams   Outcome: Progressing  Goal: Pressure injury heals and does not worsen  Description: Interventions:  - Implement low air loss mattress or specialty surface (Criteria met)  - Apply silicone foam dressing  - Instruct/assist with weight shifting every 60 minutes when in chair   - Limit chair time to 2 hour intervals  - Use special pressure reducing interventions such as cushion when in chair   - Apply fecal or urinary incontinence containment device   - Perform passive or active ROM every shift  - Turn and reposition patient & offload bony prominences every 2 hours   - Utilize friction reducing device or surface for transfers   - Consider consults to  interdisciplinary teams   - Use incontinent care products after each incontinent episode   - Consider nutrition services referral as needed  Outcome: Progressing     Problem: HEMATOLOGIC - ADULT  Goal: Maintains hematologic stability  Description: INTERVENTIONS  - Assess for signs and symptoms of bleeding or hemorrhage  - Monitor labs  - Administer supportive blood products/factors as ordered and appropriate  Outcome: Progressing     Problem: Prexisting or High Potential for Compromised Skin Integrity  Goal: Skin integrity is maintained or improved  Description: INTERVENTIONS:  - Identify patients at risk for skin breakdown  - Assess and monitor skin integrity  - Assess and monitor nutrition and hydration status  - Monitor labs   - Assess for incontinence   - Turn and reposition patient  - Assist with mobility/ambulation  - Relieve pressure over bony prominences  - Avoid friction and shearing  - Provide appropriate hygiene as needed including keeping skin clean and dry  - Evaluate need for skin moisturizer/barrier cream  - Collaborate with interdisciplinary team   - Patient/family teaching  - Consider wound care consult   Outcome: Progressing

## 2022-07-06 NOTE — ASSESSMENT & PLAN NOTE
Lab Results   Component Value Date    HGBA1C 5 3 05/22/2022       Recent Labs     07/05/22  2124 07/06/22  0735 07/06/22  1057 07/06/22  1607   POCGLU 232* 146* 222* 166*       Blood Sugar Average: Last 72 hrs:  · (P) 075 [id number]   · Continue Home regimen:   · Lantus 20 units hs, humalog 5 units tid   · C/w Accu-checks and SSI AC/HS  · Hypoglycemic protocol  · Diabetic diet

## 2022-07-06 NOTE — ASSESSMENT & PLAN NOTE
· Continue Home regimen:   · Baclofen and requip  · Chronic leg weakness, baseline   · Bed bound at baseline

## 2022-07-06 NOTE — ASSESSMENT & PLAN NOTE
· Intermittent Hypotension  · Home Medication:  · Lisinopril 5 mg daily, lasix 40 mg finch, and metoprolol succinate 25 mg daily   · Adjust Hold parameters  · S/p IV Albumin overnight  · Cardiology following

## 2022-07-06 NOTE — OCCUPATIONAL THERAPY NOTE
Occupational Therapy Screen    Patient Name: Joseph Melgoza  [id number]'[id number] Date: 7/6/2022 07/06/22 0724   OT Last Visit   OT Visit Date 07/06/22   Note Type   Note type Screen   Additional Comments OT orders received and chart reviewed  Per chart, pt resides at [facility] and is dependent for ADLs, utilizes quick move for transfers with staff  Spoke to PT who states pt is currently at baseline  At this time, OT recommendations at time of discharge are return to SNF with 24hr nursing care  No acute OT needs identified at this time; please re-consult if OT needs arise during remainder of hospital stay       [employer], MS, OTR/L

## 2022-07-06 NOTE — ASSESSMENT & PLAN NOTE
· On admission: WBC 1 51, Hg 7 2  · Recent admission platelets had been 0413  Had been started on hydrea   · Continue to hold hydrea   · S/p 1 unit prbcs ordered by ED  · No signs of active bleeding   · Hgb this morning down to 7 1-> repeat 7 8  · Continue to trend  · Hematology consulted, appreciate recommendations:  · Hemolysis workup negative  Renal function normal  T-bili slightly elevated but improving  · Continue to hold Hydrea  Continue to monitor CBC while admitted

## 2022-07-06 NOTE — PLAN OF CARE
Problem: MOBILITY - ADULT  Goal: Maintain or return to baseline ADL function  Description: INTERVENTIONS:  -  Assess patient's ability to carry out ADLs; assess patient's baseline for ADL function and identify physical deficits which impact ability to perform ADLs (bathing, care of mouth/teeth, toileting, grooming, dressing, etc )  - Assess/evaluate cause of self-care deficits   - Assess range of motion  - Assess patient's mobility; develop plan if impaired  - Assess patient's need for assistive devices and provide as appropriate  - Encourage maximum independence but intervene and supervise when necessary  - Involve family in performance of ADLs  - Assess for home care needs following discharge   - Consider OT consult to assist with ADL evaluation and planning for discharge  - Provide patient education as appropriate  Outcome: Progressing  Goal: Maintains/Returns to pre admission functional level  Description: INTERVENTIONS:  - Perform BMAT or MOVE assessment daily    - Set and communicate daily mobility goal to care team and patient/family/caregiver  - Collaborate with rehabilitation services on mobility goals if consulted  - Perform Range of Motion 4 times a day  - Reposition patient every 2 hours    - Dangle patient 2 times a day  - Stand patient 2 times a day  - Ambulate patient 2 times a day  - Out of bed to chair 2 times a day   - Out of bed for meals 2 times a day  - Out of bed for toileting  - Record patient progress and toleration of activity level   Outcome: Progressing     Problem: Potential for Falls  Goal: Patient will remain free of falls  Description: INTERVENTIONS:  - Educate patient/family on patient safety including physical limitations  - Instruct patient to call for assistance with activity   - Consult OT/PT to assist with strengthening/mobility   - Keep Call bell within reach  - Keep bed low and locked with side rails adjusted as appropriate  - Keep care items and personal belongings within reach  - Initiate and maintain comfort rounds  - Make Fall Risk Sign visible to staff  - Offer Toileting every 2 Hours, in advance of need  - Initiate/Maintain bed alarm  - Obtain necessary fall risk management equipment  - Apply yellow socks and bracelet for high fall risk patients  - Consider moving patient to room near nurses station  Outcome: Progressing     Problem: PAIN - ADULT  Goal: Verbalizes/displays adequate comfort level or baseline comfort level  Description: Interventions:  - Encourage patient to monitor pain and request assistance  - Assess pain using appropriate pain scale  - Administer analgesics based on type and severity of pain and evaluate response  - Implement non-pharmacological measures as appropriate and evaluate response  - Consider cultural and social influences on pain and pain management  - Notify physician/advanced practitioner if interventions unsuccessful or patient reports new pain  Outcome: Progressing     Problem: INFECTION - ADULT  Goal: Absence or prevention of progression during hospitalization  Description: INTERVENTIONS:  - Assess and monitor for signs and symptoms of infection  - Monitor lab/diagnostic results  - Monitor all insertion sites, i e  indwelling lines, tubes, and drains  - Monitor endotracheal if appropriate and nasal secretions for changes in amount and color  - [place] appropriate cooling/warming therapies per order  - Administer medications as ordered  - Instruct and encourage patient and family to use good hand hygiene technique  - Identify and instruct in appropriate isolation precautions for identified infection/condition  Outcome: Progressing  Goal: Absence of fever/infection during neutropenic period  Description: INTERVENTIONS:  - Monitor WBC    Outcome: Progressing     Problem: SAFETY ADULT  Goal: Maintain or return to baseline ADL function  Description: INTERVENTIONS:  -  Assess patient's ability to carry out ADLs; assess patient's baseline for ADL function and identify physical deficits which impact ability to perform ADLs (bathing, care of mouth/teeth, toileting, grooming, dressing, etc )  - Assess/evaluate cause of self-care deficits   - Assess range of motion  - Assess patient's mobility; develop plan if impaired  - Assess patient's need for assistive devices and provide as appropriate  - Encourage maximum independence but intervene and supervise when necessary  - Involve family in performance of ADLs  - Assess for home care needs following discharge   - Consider OT consult to assist with ADL evaluation and planning for discharge  - Provide patient education as appropriate  Outcome: Progressing  Goal: Maintains/Returns to pre admission functional level  Description: INTERVENTIONS:  - Perform BMAT or MOVE assessment daily    - Set and communicate daily mobility goal to care team and patient/family/caregiver  - Collaborate with rehabilitation services on mobility goals if consulted  - Perform Range of Motion 4 times a day  - Reposition patient every 2 hours    - Dangle patient 2 times a day  - Stand patient 2 times a day  - Ambulate patient 2 times a day  - Out of bed to chair 2 times a day   - Out of bed for meals 2 times a day  - Out of bed for toileting  - Record patient progress and toleration of activity level   Outcome: Progressing  Goal: Patient will remain free of falls  Description: INTERVENTIONS:  - Educate patient/family on patient safety including physical limitations  - Instruct patient to call for assistance with activity   - Consult OT/PT to assist with strengthening/mobility   - Keep Call bell within reach  - Keep bed low and locked with side rails adjusted as appropriate  - Keep care items and personal belongings within reach  - Initiate and maintain comfort rounds  - Make Fall Risk Sign visible to staff  - Offer Toileting every 2 Hours, in advance of need  - Initiate/Maintain bed alarm  - Obtain necessary fall risk management equipment  - Apply yellow socks and bracelet for high fall risk patients  - Consider moving patient to room near nurses station  Outcome: Progressing     Problem: DISCHARGE PLANNING  Goal: Discharge to home or other facility with appropriate resources  Description: INTERVENTIONS:  - Identify barriers to discharge w/patient and caregiver  - Arrange for needed discharge resources and transportation as appropriate  - Identify discharge learning needs (meds, wound care, etc )  - Arrange for interpretive services to assist at discharge as needed  - Refer to Case Management Department for coordinating discharge planning if the patient needs post-hospital services based on physician/advanced practitioner order or complex needs related to functional status, cognitive ability, or social support system  Outcome: Progressing     Problem: Knowledge Deficit  Goal: Patient/family/caregiver demonstrates understanding of disease process, treatment plan, medications, and discharge instructions  Description: Complete learning assessment and assess knowledge base    Interventions:  - Provide teaching at level of understanding  - Provide teaching via preferred learning methods  Outcome: Progressing     Problem: CARDIOVASCULAR - ADULT  Goal: Maintains optimal cardiac output and hemodynamic stability  Description: INTERVENTIONS:  - Monitor I/O, vital signs and rhythm  - Monitor for S/S and trends of decreased cardiac output  - Administer and titrate ordered vasoactive medications to optimize hemodynamic stability  - Assess quality of pulses, skin color and temperature  - Assess for signs of decreased coronary artery perfusion  - Instruct patient to report change in severity of symptoms  Outcome: Progressing  Goal: Absence of cardiac dysrhythmias or at baseline rhythm  Description: INTERVENTIONS:  - Continuous cardiac monitoring, vital signs, obtain 12 lead EKG if ordered  - Administer antiarrhythmic and heart rate control medications as ordered  - Monitor electrolytes and administer replacement therapy as ordered  Outcome: Progressing     Problem: METABOLIC, FLUID AND ELECTROLYTES - ADULT  Goal: Electrolytes maintained within normal limits  Description: INTERVENTIONS:  - Monitor labs and assess patient for signs and symptoms of electrolyte imbalances  - Administer electrolyte replacement as ordered  - Monitor response to electrolyte replacements, including repeat lab results as appropriate  - Instruct patient on fluid and nutrition as appropriate  Outcome: Progressing  Goal: Fluid balance maintained  Description: INTERVENTIONS:  - Monitor labs   - Monitor I/O and WT  - Instruct patient on fluid and nutrition as appropriate  - Assess for signs & symptoms of volume excess or deficit  Outcome: Progressing  Goal: Glucose maintained within target range  Description: INTERVENTIONS:  - Monitor Blood Glucose as ordered  - Assess for signs and symptoms of hyperglycemia and hypoglycemia  - Administer ordered medications to maintain glucose within target range  - Assess nutritional intake and initiate nutrition service referral as needed  Outcome: Progressing     Problem: SKIN/TISSUE INTEGRITY - ADULT  Goal: Skin Integrity remains intact(Skin Breakdown Prevention)  Description: Assess:  -Perform Yovani assessment every shift  -Clean and moisturize skin every day  -Inspect skin when repositioning, toileting, and assisting with ADLS  -Assess under medical devices such as tapia tubing every 2 hours  -Assess extremities for adequate circulation and sensation     Bed Management:  -Have minimal linens on bed & keep smooth, unwrinkled  -Change linens as needed when moist or perspiring  -Avoid sitting or lying in one position for more than 2 hours while in bed  -Keep HOB at 30 degrees     Toileting:  -Offer bedside commode  -Assess for incontinence every 2 hours  -Use incontinent care products after each incontinent episode    Activity:  -Mobilize patient 2 times a day  -Encourage activity and walks on unit  -Encourage or provide ROM exercises   -Turn and reposition patient every 2 Hours  -Use appropriate equipment to lift or move patient in bed  -Instruct/ Assist with weight shifting every 1 hour when out of bed in chair  -Consider limitation of chair time 4 hour intervals    Skin Care:  -Avoid use of baby powder, tape, friction and shearing, hot water or constrictive clothing  -Relieve pressure over bony prominences using Allevyn, frequent weight shifting  -Do not massage red bony areas    Next Steps:  -Teach patient strategies to minimize risks such as turning  -Consider consults to  interdisciplinary teams such as nutrition, wound care  Outcome: Progressing  Goal: Pressure injury heals and does not worsen  Description: Interventions:  - Implement low air loss mattress or specialty surface (Criteria met)  - Apply silicone foam dressing  - Instruct/assist with weight shifting every 60 minutes when in chair   - Limit chair time to 4 hour intervals  - Use special pressure reducing interventions such as waffle cushion when in chair   - Apply fecal or urinary incontinence containment device   - Perform passive or active ROM every 4 hours  - Turn and reposition patient & offload bony prominences every 2 hours   - Utilize friction reducing device or surface for transfers   - Consider consults to  interdisciplinary teams such as nutrition, wound care   - Use incontinent care products after each incontinent episode such as barrier cream  - Consider nutrition services referral as needed  Outcome: Progressing     Problem: HEMATOLOGIC - ADULT  Goal: Maintains hematologic stability  Description: INTERVENTIONS  - Assess for signs and symptoms of bleeding or hemorrhage  - Monitor labs  - Administer supportive blood products/factors as ordered and appropriate  Outcome: Progressing     Problem: Prexisting or High Potential for Compromised Skin Integrity  Goal: Skin integrity is maintained or improved  Description: INTERVENTIONS:  - Identify patients at risk for skin breakdown  - Assess and monitor skin integrity  - Assess and monitor nutrition and hydration status  - Monitor labs   - Assess for incontinence   - Turn and reposition patient  - Assist with mobility/ambulation  - Relieve pressure over bony prominences  - Avoid friction and shearing  - Provide appropriate hygiene as needed including keeping skin clean and dry  - Evaluate need for skin moisturizer/barrier cream  - Collaborate with interdisciplinary team   - Patient/family teaching  - Consider wound care consult   Outcome: Progressing

## 2022-07-06 NOTE — ASSESSMENT & PLAN NOTE
Lab Results   Component Value Date    EGFR 58 07/06/2022    EGFR 48 07/05/2022    EGFR 57 07/05/2022    CREATININE 1 24 07/06/2022    CREATININE 1 44 (H) 07/05/2022    CREATININE 1 26 07/05/2022   · Stable  · Baseline appears to be about 1 3  · Continue to monitor while admitted

## 2022-07-06 NOTE — ASSESSMENT & PLAN NOTE
· SIRS criteria: Tachycardia, Leukopenia (WBC 1 51)   · Likely related to cardiac arrhyhtmia and pancytopenia  · Lactic 2 1-->3 1   · Suspect due to poor perfusion from decreased hemoglobin   · Appears fluid overload on exam  Hold off on starting IV fluids   · Procal WNL   · COVID/flu/RSV: Negative   · UA: Negative   · Do not suspect acute infection     · Patient with new pancytopenia after being started on hydrea   · Monitor CBC/ Fever curve

## 2022-07-07 PROBLEM — I47.1 ATRIAL TACHYCARDIA (HCC): Status: ACTIVE | Noted: 2022-07-07

## 2022-07-07 LAB
ANION GAP SERPL CALCULATED.3IONS-SCNC: 7 MMOL/L (ref 4–13)
BASOPHILS # BLD AUTO: 0 THOUSANDS/ΜL (ref 0–0.1)
BASOPHILS NFR BLD AUTO: 0 % (ref 0–1)
BUN SERPL-MCNC: 44 MG/DL (ref 5–25)
CALCIUM SERPL-MCNC: 8.8 MG/DL (ref 8.3–10.1)
CHLORIDE SERPL-SCNC: 103 MMOL/L (ref 100–108)
CO2 SERPL-SCNC: 28 MMOL/L (ref 21–32)
CREAT SERPL-MCNC: 1.35 MG/DL (ref 0.6–1.3)
EOSINOPHIL # BLD AUTO: 0.1 THOUSAND/ΜL (ref 0–0.61)
EOSINOPHIL NFR BLD AUTO: 5 % (ref 0–6)
ERYTHROCYTE [DISTWIDTH] IN BLOOD BY AUTOMATED COUNT: 19.3 % (ref 11.6–15.1)
GFR SERPL CREATININE-BSD FRML MDRD: 52 ML/MIN/1.73SQ M
GLUCOSE SERPL-MCNC: 149 MG/DL (ref 65–140)
GLUCOSE SERPL-MCNC: 157 MG/DL (ref 65–140)
GLUCOSE SERPL-MCNC: 159 MG/DL (ref 65–140)
GLUCOSE SERPL-MCNC: 172 MG/DL (ref 65–140)
GLUCOSE SERPL-MCNC: 252 MG/DL (ref 65–140)
HCT VFR BLD AUTO: 22.1 % (ref 36.5–49.3)
HCT VFR BLD AUTO: 24.6 % (ref 36.5–49.3)
HGB BLD-MCNC: 7.1 G/DL (ref 12–17)
HGB BLD-MCNC: 7.8 G/DL (ref 12–17)
IMM GRANULOCYTES # BLD AUTO: 0.02 THOUSAND/UL (ref 0–0.2)
IMM GRANULOCYTES NFR BLD AUTO: 1 % (ref 0–2)
LYMPHOCYTES # BLD AUTO: 1.26 THOUSANDS/ΜL (ref 0.6–4.47)
LYMPHOCYTES NFR BLD AUTO: 58 % (ref 14–44)
MCH RBC QN AUTO: 27.5 PG (ref 26.8–34.3)
MCHC RBC AUTO-ENTMCNC: 32.1 G/DL (ref 31.4–37.4)
MCV RBC AUTO: 86 FL (ref 82–98)
MONOCYTES # BLD AUTO: 0.19 THOUSAND/ΜL (ref 0.17–1.22)
MONOCYTES NFR BLD AUTO: 9 % (ref 4–12)
NEUTROPHILS # BLD AUTO: 0.57 THOUSANDS/ΜL (ref 1.85–7.62)
NEUTS SEG NFR BLD AUTO: 27 % (ref 43–75)
NRBC BLD AUTO-RTO: 0 /100 WBCS
PLATELET # BLD AUTO: 92 THOUSANDS/UL (ref 149–390)
PMV BLD AUTO: 11 FL (ref 8.9–12.7)
POTASSIUM SERPL-SCNC: 4.1 MMOL/L (ref 3.5–5.3)
RBC # BLD AUTO: 2.58 MILLION/UL (ref 3.88–5.62)
SODIUM SERPL-SCNC: 138 MMOL/L (ref 136–145)
WBC # BLD AUTO: 2.14 THOUSAND/UL (ref 4.31–10.16)

## 2022-07-07 PROCEDURE — 80048 BASIC METABOLIC PNL TOTAL CA: CPT | Performed by: NURSE PRACTITIONER

## 2022-07-07 PROCEDURE — 99232 SBSQ HOSP IP/OBS MODERATE 35: CPT | Performed by: INTERNAL MEDICINE

## 2022-07-07 PROCEDURE — 82948 REAGENT STRIP/BLOOD GLUCOSE: CPT

## 2022-07-07 PROCEDURE — 85014 HEMATOCRIT: CPT | Performed by: PHYSICIAN ASSISTANT

## 2022-07-07 PROCEDURE — 85025 COMPLETE CBC W/AUTO DIFF WBC: CPT

## 2022-07-07 PROCEDURE — 85018 HEMOGLOBIN: CPT | Performed by: PHYSICIAN ASSISTANT

## 2022-07-07 RX ORDER — METOPROLOL SUCCINATE 25 MG/1
25 TABLET, EXTENDED RELEASE ORAL ONCE
Status: COMPLETED | OUTPATIENT
Start: 2022-07-07 | End: 2022-07-07

## 2022-07-07 RX ORDER — METOPROLOL SUCCINATE 50 MG/1
50 TABLET, EXTENDED RELEASE ORAL DAILY
Status: DISCONTINUED | OUTPATIENT
Start: 2022-07-08 | End: 2022-07-08 | Stop reason: HOSPADM

## 2022-07-07 RX ADMIN — AMIODARONE HYDROCHLORIDE 200 MG: 200 TABLET ORAL at 17:03

## 2022-07-07 RX ADMIN — POTASSIUM CHLORIDE 40 MEQ: 1500 TABLET, EXTENDED RELEASE ORAL at 08:21

## 2022-07-07 RX ADMIN — GABAPENTIN 300 MG: 300 CAPSULE ORAL at 08:22

## 2022-07-07 RX ADMIN — ROPINIROLE HYDROCHLORIDE 0.5 MG: 1 TABLET, FILM COATED ORAL at 22:12

## 2022-07-07 RX ADMIN — TAMSULOSIN HYDROCHLORIDE 0.4 MG: 0.4 CAPSULE ORAL at 17:04

## 2022-07-07 RX ADMIN — LORATADINE 10 MG: 10 TABLET ORAL at 08:22

## 2022-07-07 RX ADMIN — GABAPENTIN 600 MG: 300 CAPSULE ORAL at 22:12

## 2022-07-07 RX ADMIN — INSULIN LISPRO 5 UNITS: 100 INJECTION, SOLUTION INTRAVENOUS; SUBCUTANEOUS at 13:02

## 2022-07-07 RX ADMIN — AMIODARONE HYDROCHLORIDE 200 MG: 200 TABLET ORAL at 08:22

## 2022-07-07 RX ADMIN — INSULIN GLARGINE 20 UNITS: 100 INJECTION, SOLUTION SUBCUTANEOUS at 22:12

## 2022-07-07 RX ADMIN — RANOLAZINE 500 MG: 500 TABLET, EXTENDED RELEASE ORAL at 21:30

## 2022-07-07 RX ADMIN — ASPIRIN 81 MG: 81 TABLET, COATED ORAL at 08:24

## 2022-07-07 RX ADMIN — INSULIN LISPRO 5 UNITS: 100 INJECTION, SOLUTION INTRAVENOUS; SUBCUTANEOUS at 08:43

## 2022-07-07 RX ADMIN — SODIUM CHLORIDE 3 ML: 9 INJECTION, SOLUTION INTRAMUSCULAR; INTRAVENOUS; SUBCUTANEOUS at 06:18

## 2022-07-07 RX ADMIN — TORSEMIDE 20 MG: 20 TABLET ORAL at 08:22

## 2022-07-07 RX ADMIN — ATORVASTATIN CALCIUM 40 MG: 40 TABLET, FILM COATED ORAL at 17:04

## 2022-07-07 RX ADMIN — METOPROLOL SUCCINATE 25 MG: 25 TABLET, FILM COATED, EXTENDED RELEASE ORAL at 08:24

## 2022-07-07 RX ADMIN — INSULIN LISPRO 3 UNITS: 100 INJECTION, SOLUTION INTRAVENOUS; SUBCUTANEOUS at 13:01

## 2022-07-07 RX ADMIN — PANTOPRAZOLE SODIUM 40 MG: 40 TABLET, DELAYED RELEASE ORAL at 08:22

## 2022-07-07 RX ADMIN — RANOLAZINE 500 MG: 500 TABLET, EXTENDED RELEASE ORAL at 08:22

## 2022-07-07 RX ADMIN — INSULIN LISPRO 1 UNITS: 100 INJECTION, SOLUTION INTRAVENOUS; SUBCUTANEOUS at 22:19

## 2022-07-07 RX ADMIN — CHOLESTYRAMINE 4 G: 4 POWDER, FOR SUSPENSION ORAL at 08:24

## 2022-07-07 RX ADMIN — CHOLESTYRAMINE 4 G: 4 POWDER, FOR SUSPENSION ORAL at 17:00

## 2022-07-07 RX ADMIN — SODIUM CHLORIDE 3 ML: 9 INJECTION, SOLUTION INTRAMUSCULAR; INTRAVENOUS; SUBCUTANEOUS at 15:00

## 2022-07-07 RX ADMIN — AMIODARONE HYDROCHLORIDE 200 MG: 200 TABLET ORAL at 13:04

## 2022-07-07 RX ADMIN — GABAPENTIN 300 MG: 300 CAPSULE ORAL at 17:03

## 2022-07-07 RX ADMIN — SODIUM CHLORIDE 3 ML: 9 INJECTION, SOLUTION INTRAMUSCULAR; INTRAVENOUS; SUBCUTANEOUS at 22:18

## 2022-07-07 RX ADMIN — INSULIN LISPRO 5 UNITS: 100 INJECTION, SOLUTION INTRAVENOUS; SUBCUTANEOUS at 17:06

## 2022-07-07 RX ADMIN — METOPROLOL SUCCINATE 25 MG: 25 TABLET, FILM COATED, EXTENDED RELEASE ORAL at 13:04

## 2022-07-07 RX ADMIN — INSULIN LISPRO 1 UNITS: 100 INJECTION, SOLUTION INTRAVENOUS; SUBCUTANEOUS at 17:07

## 2022-07-07 NOTE — PROGRESS NOTES
[place]  Progress Note - [facility] 1950, 70 y o  male MRN: [id number]  Unit/Bed#: -01 Encounter: [id number]  Primary Care Provider: Cheryl Recinos MD   Date and time admitted to hospital: 7/4/2022 12:52 AM    SIRS (systemic inflammatory response syndrome) ([facility] [address] )  Assessment & Plan  · SIRS criteria: Tachycardia, Leukopenia (WBC 1 51)   · Likely related to cardiac arrhythmia and pancytopenia  · Lactic cleared  · Procal WNL   · COVID/flu/RSV: Negative   · UA: Negative   · Do not suspect acute infection  · Patient with new pancytopenia after being started on hydrea   · Monitor CBC/ Fever curve      Coronary artery disease involving native coronary artery  Assessment & Plan  · Continue Home Medication:  · Aspirin, statin, and BB    Elevated troponin  Assessment & Plan  · Presented with CP that had occurred earlier in the day, now resolved   · Troponin 2212->2791  · EKG: Sinus tachycardia 113   · ER discussed with cardiology who did not recommend transfer for cardiac cath   · Unable to be placed on heparin drip due to pancytopenia  Patient prefers medical management and does not wish to undergo invasive procedures    · Repeat Echo (7/5): EF 50%, grade 2 diastolic dysfunction  · Continue with Telemetry  · Cardiology following     Type 2 diabetes mellitus, with long-term current use of insulin [facility])  Assessment & Plan  Lab Results   Component Value Date    HGBA1C 5 3 05/22/2022       Recent Labs     07/06/22  1057 07/06/22  1607 07/06/22  2114 07/07/22  0737   POCGLU 222* 166* 153* 149*       Blood Sugar Average: Last 72 hrs:  · (P) 187 [id number]   · Continue Home regimen:   · Lantus 20 units hs, humalog 5 units tid   · C/w Accu-checks and SSI AC/HS  · Hypoglycemic protocol  · Diabetic diet    Atrial tachycardia (HCC)  Assessment & Plan  · Multiple runs of atrial tachycardia noted on telemetry  · Started on amiodarone 200 mg TID on 7/6  · Consider increasing Metoprolol XL 25 mg daily as HR remains elevated , will discuss with cardiology   · Optimize electrolytes  · Continue telemetry    Hypomagnesemia  Assessment & Plan  · Monitor/Replete PRN    Stage 3 chronic kidney disease, unspecified whether stage 3a or 3b CKD [facility])  Assessment & Plan  Lab Results   Component Value Date    EGFR 52 07/07/2022    EGFR 58 07/06/2022    EGFR 48 07/05/2022    CREATININE 1 35 (H) 07/07/2022    CREATININE 1 24 07/06/2022    CREATININE 1 44 (H) 07/05/2022   · Stable  · Baseline appears to be about 1 1-1 3  · Continue to monitor while receiving diuresis  · Monitor UOP    Acute on chronic combined systolic and diastolic congestive heart failure (HCC)  Assessment & Plan  Wt Readings from Last 3 Encounters:   07/07/22 90 3 kg (199 lb 1 2 oz)   05/26/22 89 3 kg (196 lb 13 9 oz)   02/25/22 94 4 kg (208 lb 3 2 oz)     · Mildly volume overloaded   · BNP 1062  · Home regimen: 40 mg lasix PO daily   · Prior ECHO 05/22: EF 02%, grade 2 diastolic dysfunction   · Repeat Echo (7/5): EF 35%, grade 2 diastolic dysfunction  · Now on torsemide 20 mg daily  · Monitor I/O and daily weight   · Continue low NA diet and Fluid restriction   · Telemetry reviewed: NSR with HR 80's  Patient with runs of Atrial tachycardia overnight  · Continue to monitor overnight  · Cardiology consulted, appreciate recommendations:  · Found to have drop in EF with regional wall motion abnormalities  Likely representing ischemic cardiomyopathy  · Conservative measures were recommended, and patient did not want to pursue cardiac cath  Continue with medical therapy   Plan to discontinue Lisinopril to up titrate anti-anginal's and beta-blockers if need be    HTN (hypertension)  Assessment & Plan  · Intermittent Hypotension  · Home Medication: Lisinopril 5 mg daily, lasix 40 mg finch, and metoprolol succinate 25 mg daily   · Lisinopril discontinued 2/2 hypotension  · Cardiology following  · Current regimen: Metoprolol XL 25 mg daily and torsemide 20 mg daily  · Consider increasing metoprolol 2/2 continued tachycardia    Multiple sclerosis (HCC)  Assessment & Plan  · Continue Home regimen:   · Baclofen and requip  · Chronic leg weakness, baseline   · Bed bound at baseline    * Pancytopenia ([address] )  Assessment & Plan  · On admission: WBC 1 51, Hg 7 2  · Recent admission platelets had been 4081  Had been started on hydrea   · Continue to hold hydrea   · S/p 1 unit prbcs ordered by ED  · No signs of active bleeding   · Hgb this morning down to 7 1-> repeat pending  · Consider transfusion of 1 u prbc if hgb remains around 7 0 given extensive cardiac hx  · Will check stool for blood  · Hematology consulted, appreciate recommendations:  · Hemolysis workup negative  Renal function normal  T-bili slightly elevated but improving  · Continue to hold Hydrea  Continue to monitor CBC while admitted        ----------------------------------------------------------------------------------------  HPI/24hr events: Started on amiodarone by Cardiology 2/2 runs of atrial tachycardia on telemetry  In sinus rhythm this morning but continues with episodes of tachycardia  No acute events overnight  Disposition: Continue MS telemetry  Code Status: Level 3 - DNAR and DNI  ---------------------------------------------------------------------------------------  SUBJECTIVE  Patient states that he feels well this morning but does note some mild nausea  He was able to eat breakfast and feels that eating helped  He is out of bed sitting in the chair  Review of Systems   Constitutional: Negative for chills, fatigue and fever  HENT: Negative for rhinorrhea  Respiratory: Negative for cough and shortness of breath  Cardiovascular: Negative for chest pain, palpitations and leg swelling  Gastrointestinal: Positive for nausea  Negative for abdominal distention, abdominal pain, diarrhea and vomiting  Neurological: Negative for dizziness, weakness and headaches     All other systems reviewed and are negative  Review of systems was reviewed and negative unless stated above in HPI/24-hour events   ---------------------------------------------------------------------------------------  OBJECTIVE    Vitals   Vitals:    22 2300 22 0300 22 0600 22 0800   BP: 105/57 115/53  133/60   BP Location: Right arm Right arm     Pulse: 89 90  94   Resp: 22 21  20   Temp: 97 6 °F (36 4 °C) 98 °F (36 7 °C)  98 1 °F (36 7 °C)   TempSrc: Oral Oral  Oral   SpO2: 92% 95%  97%   Weight:   90 3 kg (199 lb 1 2 oz)    Height:         Temp (24hrs), Av 1 °F (36 7 °C), Min:97 6 °F (36 4 °C), Max:98 5 °F (36 9 °C)  Current: Temperature: 98 1 °F (36 7 °C)          Respiratory:  SpO2: SpO2: 97 %  Nasal Cannula O2 Flow Rate (L/min): 2 L/min    Invasive/non-invasive ventilation settings   Respiratory  Report   Lab Data (Last 4 hours)    None         O2/Vent Data (Last 4 hours)    None                Physical Exam  Vitals reviewed  Constitutional:       General: He is not in acute distress  Appearance: He is not ill-appearing, toxic-appearing or diaphoretic  HENT:      Head: Normocephalic and atraumatic  Nose: Nose normal       Mouth/Throat:      Mouth: Mucous membranes are moist    Eyes:      Extraocular Movements: Extraocular movements intact  Pupils: Pupils are equal, round, and reactive to light  Cardiovascular:      Rate and Rhythm: Normal rate and regular rhythm  Heart sounds: No murmur heard  No friction rub  No gallop  Pulmonary:      Breath sounds: No wheezing, rhonchi or rales  Abdominal:      General: Abdomen is flat  There is no distension  Palpations: Abdomen is soft  Tenderness: There is no abdominal tenderness  There is no guarding or rebound  Musculoskeletal:      Right lower leg: No edema  Left lower leg: No edema  Skin:     General: Skin is warm and dry        Capillary Refill: Capillary refill takes less than 2 seconds  Neurological:      Mental Status: He is alert and oriented to person, place, and time  Mental status is at baseline  Comments: Baseline RLE weakness  B/l UE tremors   At baseline   Psychiatric:         Mood and Affect: Mood normal          Behavior: Behavior normal              Laboratory and Diagnostics:  Results from last 7 days   Lab Units 07/07/22 0432 07/06/22 2013 07/06/22  1213 07/06/22  0551 07/05/22  0253 07/04/22  1750 07/04/22  1001 07/04/22  0107   WBC Thousand/uL 2 14*  --   --  2 03* 1 80*  --   --  1 51*   HEMOGLOBIN g/dL 7 1* 7 5* 7 8* 7 1* 7 9* 8 4* 7 6* 7 2*   HEMATOCRIT % 22 1* 23 7* 23 8* 21 7* 23 7* 25 1* 23 4* 21 8*   PLATELETS Thousands/uL 92*  --   --  59* 50*  --   --  45*   NEUTROS PCT % 27*  --   --   --   --   --   --   --    MONOS PCT % 9  --   --   --   --   --   --   --    MONO PCT %  --   --   --  3*  --   --   --  1*     Results from last 7 days   Lab Units 07/07/22 0432 07/06/22 0551 07/05/22 1814 07/05/22  0253 07/04/22  0107   SODIUM mmol/L 138 139 135* 139 137   POTASSIUM mmol/L 4 1 3 9 3 8 3 8 4 2   CHLORIDE mmol/L 103 104 101 104 103   CO2 mmol/L 28 26 25 28 22   ANION GAP mmol/L 7 9 9 7 12   BUN mg/dL 44* 40* 38* 33* 29*   CREATININE mg/dL 1 35* 1 24 1 44* 1 26 1 20   CALCIUM mg/dL 8 8 8 8 8 8 8 9 9 0   GLUCOSE RANDOM mg/dL 157* 145* 196* 167* 280*   ALT U/L  --  33  --  40 39   AST U/L  --  43  --  79* 54*   ALK PHOS U/L  --  100  --  110 119*   ALBUMIN g/dL  --  3 5  --  3 5 3 7   TOTAL BILIRUBIN mg/dL  --  0 90  --  1 20* 1 30*     Results from last 7 days   Lab Units 07/05/22 1814 07/05/22  0253 07/04/22  0107   MAGNESIUM mg/dL 1 8 2 3 1 4*      Results from last 7 days   Lab Units 07/04/22  0107   INR  1 09   PTT seconds 32          Results from last 7 days   Lab Units 07/05/22  0253 07/04/22  1001 07/04/22  0613 07/04/22  0412 07/04/22  0107   LACTIC ACID mmol/L 1 6 3 1* 2 7* 3 1* 2 1*     ABG:    VBG:    Results from last 7 days   Lab Units 07/04/22  0107   PROCALCITONIN ng/ml 0 08       Micro  Results from last 7 days   Lab Units 07/04/22  0107   BLOOD CULTURE  No Growth at 72 hrs  No Growth at 72 hrs  EKG: NSR  Imaging: I have personally reviewed pertinent reports  Intake and Output  I/O       07/05 0701 07/06 0700 07/06 0701 07/07 0700 07/07 0701 07/08 0700    P  O  710 593 440    I V  (mL/kg)       Blood       IV Piggyback 300      Total Intake(mL/kg) 1010 (11 2) 593 (6 6) 440 (4 9)    Urine (mL/kg/hr) 2000 (0 9) 1405 (0 6)     Stool  0     Total Output 2000 1405     Net -990 -812 +440           Unmeasured Stool Occurrence  1 x           Height and Weights   Height: 5' 10" (177 8 cm)  IBW (Ideal Body Weight): 73 kg  Body mass index is 28 56 kg/m²  Weight (last 2 days)     Date/Time Weight    07/07/22 0600 90 3 (199 08)    07/06/22 0557 89 8 (197 97)    07/05/22 1324 88 9 (196)    07/05/22 0600 91 3 (201 28)            Nutrition       Diet Orders   (From admission, onward)             Start     Ordered    07/06/22 1435  Diet Cardiovascular; Sodium 2 GM; Fluid Restriction 2000 ML  Diet effective now        References:    Nutrtion Support Algorithm Enteral vs  Parenteral   Question Answer Comment   Diet Type Cardiovascular    Cardiac Sodium 2 GM    Other Restriction(s): Fluid Restriction 2000 ML    RD to adjust diet per protocol?  Yes        07/06/22 1434                  Active Medications  Scheduled Meds:  Current Facility-Administered Medications   Medication Dose Route Frequency Provider Last Rate    acetaminophen  650 mg Oral Q6H PRN Areradha Huertas PA-C      aluminum-magnesium hydroxide-simethicone  30 mL Oral Q4H PRN CATARINO Davalos      amiodarone  200 mg Oral TID With Meals CATARINO Rios      aspirin  81 mg Oral Daily Mark Huertas PA-C      atorvastatin  40 mg Oral Daily With KAY Hodges      baclofen  10 mg Oral Q6H PRN Mark Huertas PA-C      cholestyramine sugar free  4 g Oral BID Mark Huertas PA-C      gabapentin  300 mg Oral BID Stephy Giron PA-C      gabapentin  600 mg Oral HS Stephy Giron PA-C      insulin glargine  20 Units Subcutaneous HS Stephy Giron PA-C      insulin lispro  1-6 Units Subcutaneous TID TABATHA Gambinomichelle GrimmKAY [place]      insulin lispro  1-6 Units Subcutaneous HS Stephy Giron PA-C      insulin lispro  5 Units Subcutaneous TID With Meals Stephy Giron PA-C      loratadine  10 mg Oral Daily Stephy Giron PA-C      metoprolol succinate  25 mg Oral Daily Stephy Giron PA-C      ondansetron  4 mg Intravenous Q6H PRN Stephy Giron PA-C      pantoprazole  40 mg Oral Daily Before Breakfast Stephy Giron PA-C      potassium chloride  40 mEq Oral Daily Stephy Giron PA-C      ranolazine  500 mg Oral Q12H [address] CATARINO [place]      rOPINIRole  0 5 mg Oral HS Stephy Giron PA-C      sodium chloride (PF)  3 mL Intravenous Q8H [address] Stephy Giron PA-C      tamsulosin  0 4 mg Oral Daily With Dinner Stephy Giron PA-C      torsemide  20 mg Oral Daily CATARINO [place]       Continuous Infusions:     PRN Meds:   acetaminophen, 650 mg, Q6H PRN  aluminum-magnesium hydroxide-simethicone, 30 mL, Q4H PRN  baclofen, 10 mg, Q6H PRN  ondansetron, 4 mg, Q6H PRN        Invasive Devices Review  Invasive Devices  Report    Peripheral Intravenous Line  Duration           Peripheral IV 07/04/22 Left Arm 3 days    Peripheral IV 07/04/22 Right Antecubital 3 days          Drain  Duration           External Urinary Catheter Medium <1 day                ---------------------------------------------------------------------------------------  Advance Directive and Living Will:      Power of :    POLST:    ---------------------------------------------------------------------------------------  Care Time Delivered:   No Critical Care time spent       Casey Lunsford PA-C      Portions of the record may have been created with voice recognition software    Occasional wrong word or "sound a like" substitutions may have occurred due to the inherent limitations of voice recognition software    Read the chart carefully and recognize, using context, where substitutions have occurred

## 2022-07-07 NOTE — ASSESSMENT & PLAN NOTE
Lab Results   Component Value Date    HGBA1C 5 3 05/22/2022       Recent Labs     07/06/22  1057 07/06/22  1607 07/06/22  2114 07/07/22  0737   POCGLU 222* 166* 153* 149*       Blood Sugar Average: Last 72 hrs:  · (P) 187 [id number]   · Continue Home regimen:   · Lantus 20 units hs, humalog 5 units tid   · C/w Accu-checks and SSI AC/HS  · Hypoglycemic protocol  · Diabetic diet

## 2022-07-07 NOTE — ASSESSMENT & PLAN NOTE
Lab Results   Component Value Date    EGFR 52 07/07/2022    EGFR 58 07/06/2022    EGFR 48 07/05/2022    CREATININE 1 35 (H) 07/07/2022    CREATININE 1 24 07/06/2022    CREATININE 1 44 (H) 07/05/2022   · Stable  · Baseline appears to be about 1 1-1 3  · Continue to monitor while receiving diuresis  · Monitor UOP

## 2022-07-07 NOTE — ASSESSMENT & PLAN NOTE
· On admission: WBC 1 51, Hg 7 2  · Recent admission platelets had been 3976  Had been started on hydrea   · Continue to hold hydrea   · S/p 1 unit prbcs ordered by ED  · No signs of active bleeding   · Hgb this morning down to 7 1-> repeat pending  · Consider transfusion of 1 u prbc if hgb remains around 7 0 given extensive cardiac hx  · Will check stool for blood  · Hematology consulted, appreciate recommendations:  · Hemolysis workup negative  Renal function normal  T-bili slightly elevated but improving  · Continue to hold Hydrea  Continue to monitor CBC while admitted

## 2022-07-07 NOTE — ASSESSMENT & PLAN NOTE
· SIRS criteria: Tachycardia, Leukopenia (WBC 1 51)   · Likely related to cardiac arrhythmia and pancytopenia  · Lactic cleared  · Procal WNL   · COVID/flu/RSV: Negative   · UA: Negative   · Do not suspect acute infection     · Patient with new pancytopenia after being started on hydrea   · Monitor CBC/ Fever curve

## 2022-07-07 NOTE — PROGRESS NOTES
General Cardiology   Progress Note -  [facility] One   Albertus Bernheim 70 y o  male MRN: [id number]    Unit/Bed#: -01 Encounter: [id number]    Assessment:     Acute HFmEF  · Home med: lasix 40 mg QD  · IV lasix 40 mg BID transitioned to PO torsemide 20 mg QD as of yesterday AM; creat slightly elevated today but overall, stable  · I/O: total UO in 24 hrs is 1 4 L; no PO intake recorded for night shift, cannot calculate negative status  · Weight this AM is 199 lbs up from 197 lbs yesterday (question accuracy); weight at last discharge after CHF admit was 196 lbs  · TTE as below  · Daily weights; strict I/O's; 2 g Na restricted diet with fluid restriction     Elevated troponin   · EKG on admit sinus tachycardia with PACs, rate of 113; nonspecific ST/T-wave changes noted  · hsTn 2,212->2,791->4,556  · Patient's case reviewed with Dr Lois Lynn and elevated troponins likely secondary to acute CHF exacerbation; regardless, patient refuses cardiac catheterization and wishes to be medically managed  · Cannot start IV heparin drip secondary to pancytopenia     Cardiomyopathy  · TTE from yesterday: EF 40% (had been 45% in 5/2022 and 55% in 2017); akinesis of the apical septal, apical inferior walls and apex; hypokinesis of the basal inferior septal, mid anterior septal, mid inferior septal and apical anterior walls; mild-to-moderate MR  · Patient wishes to pursue medical management and does not want to pursue cardiac catheterization  · GDMT: Toprol XL 50 mg QD and torsemide 20 mg QD; had to d/c lisinopril to make room for increase in beta blocker for Atach    Supraventricular tachycardia/Atach  · Since late afternoon on 7/5, the patient has been having brief episodes of a supraventricular tachycardic rhythm (possibly Atach) with rates up into the 120s  · These episodes are short lived but did last roughly an hour that evening  · PO amiodarone 200 mg TID started yesterday afternoon per Dr Loni Davis  · At time of exam this AM he is in sinus rhythm with occasional PACs with rates in the 90s but per staff, he has had increased rates up into the 120s intermittently  · Will increase Toprol XL to 50 mg QD    Essential HTN  · SBP averaging 100's-110's  · Home meds: Toprol XL 50 mg QD and torsemide 20 mg QD     CAD  · Noted on paperwork from facility; no prior documentation  · GDMT: aspirin 81 mg QD, Plavix 75 mg QD; Toprol XL 50 mg QD and Lipitor 40 mg QD   · Ranexa 500 mg BID started yesterday secondary to the patient noting chest discomfort on admission with probable underlying CAD    CKD stage 3  · Baseline creat 1 3  · Creat on admit 1 2  · Creat this AM is 1 3 up from 1 2 yesterday     DM2  · HgbA1c 5 3  · Management per primary team     pancytopenia  · WBC's 1 5; Hgb 7 2 and PLT 45  · Prior hx of thrombocytosis noted in 2017  · Started on hydroxurea during last admit by Hem/Onc  · 1 unit PRBC's in ED this admit  · Hematology consulted  · On ASA/Plavix as outpatient; Plavix is on hold   · This AM Hgb is 7 1     Multiple sclerosis       Plan/Recommendations:  · continue torsemide 20 mg   · Increase Toprol XL to 50 mg QD for Atach  · We d/c'd home dose of lisinopril to make room for increase in BB  · Continue amiodarone 200 mg TID  · Continue Ranexa 500 mg BID  · Continue remainder of home medications    _______________________________________________________________________    Subjective  Patient offers no acute complaints this morning  No complaints of shortness of breath at rest       Review of Systems   Constitutional: Negative for chills, fever and malaise/fatigue  HENT: Negative for congestion  Cardiovascular: Positive for dyspnea on exertion  Negative for chest pain, leg swelling, orthopnea and palpitations  Respiratory: Negative for cough, shortness of breath (no SOB at rest) and wheezing  Endocrine: Negative  Hematologic/Lymphatic: Negative  Skin: Negative      Gastrointestinal: Negative for bloating, abdominal pain, nausea and vomiting  Genitourinary: Negative  Neurological: Negative for dizziness and light-headedness  Psychiatric/Behavioral: Negative  All other systems reviewed and are negative  Objective:   Vitals: Blood pressure 133/60, pulse 94, temperature 98 1 °F (36 7 °C), temperature source Oral, resp  rate 20, height 5' 10" (1 778 m), weight 90 3 kg (199 lb 1 2 oz), SpO2 97 %  ,     Wt Readings from Last 3 Encounters:   07/07/22 90 3 kg (199 lb 1 2 oz)   05/26/22 89 3 kg (196 lb 13 9 oz)   02/25/22 94 4 kg (208 lb 3 2 oz)        Lab Results   Component Value Date    CREATININE 1 35 (H) 07/07/2022    CREATININE 1 24 07/06/2022    CREATININE 1 44 (H) 07/05/2022         Body mass index is 28 56 kg/m²  ,     Systolic (35BHL), MFD:990 , Min:97 , IFT:587     Diastolic (12YRP), QQT:31, Min:53, Max:60          Intake/Output Summary (Last 24 hours) at 7/7/2022 1132  Last data filed at 7/7/2022 0849  Gross per 24 hour   Intake 617 ml   Output 1280 ml   Net -663 ml     Weight (last 2 days)     Date/Time Weight    07/07/22 0600 90 3 (199 08)    07/06/22 0557 89 8 (197 97)    07/05/22 1324 88 9 (196)    07/05/22 0600 91 3 (201 28)            Telemetry Review:  Sinus rhythm with occasional PACs rates in the 90s        Physical Exam  Vitals reviewed  Constitutional:       General: He is not in acute distress  HENT:      Head: Normocephalic and atraumatic  Mouth/Throat:      Mouth: Mucous membranes are moist    Cardiovascular:      Rate and Rhythm: Normal rate and regular rhythm  Heart sounds: Normal heart sounds, S1 normal and S2 normal  No murmur heard  Pulmonary:      Effort: Pulmonary effort is normal  No respiratory distress  Breath sounds: Normal breath sounds  Abdominal:      General: Bowel sounds are normal  There is no distension  Palpations: Abdomen is soft  Musculoskeletal:         General: Normal range of motion  Cervical back: Normal range of motion and neck supple        Right lower leg: No edema  Left lower leg: No edema  Skin:     General: Skin is warm and dry  Neurological:      Mental Status: He is alert and oriented to person, place, and time  Psychiatric:         Mood and Affect: Mood normal          LABORATORY RESULTS      CBC with diff:   Results from last 7 days   Lab Units 07/07/22  1105 07/07/22  0432 07/06/22  2013 07/06/22  1213 07/06/22  0551 07/05/22  0253 07/04/22  1750 07/04/22  1001 07/04/22  0107   WBC Thousand/uL  --  2 14*  --   --  2 03* 1 80*  --   --  1 51*   HEMOGLOBIN g/dL 7 8* 7 1* 7 5* 7 8* 7 1* 7 9* 8 4*   < > 7 2*   HEMATOCRIT % 24 6* 22 1* 23 7* 23 8* 21 7* 23 7* 25 1*   < > 21 8*   MCV fL  --  86  --   --  86 86  --   --  86   PLATELETS Thousands/uL  --  92*  --   --  59* 50*  --   --  45*   MCH pg  --  27 5  --   --  28 2 28 5  --   --  28 2   MCHC g/dL  --  32 1  --   --  32 7 33 3  --   --  33 0   RDW %  --  19 3*  --   --  18 7* 18 7*  --   --  19 7*   MPV fL  --  11 0  --   --  11 8  --   --   --  10 2   NRBC AUTO /100 WBCs  --  0  --   --   --  0  --   --   --     < > = values in this interval not displayed         CMP:  Results from last 7 days   Lab Units 07/07/22  0432 07/06/22  0551 07/05/22  1814 07/05/22  0253 07/04/22  0107   POTASSIUM mmol/L 4 1 3 9 3 8 3 8 4 2   CHLORIDE mmol/L 103 104 101 104 103   CO2 mmol/L 28 26 25 28 22   BUN mg/dL 44* 40* 38* 33* 29*   CREATININE mg/dL 1 35* 1 24 1 44* 1 26 1 20   CALCIUM mg/dL 8 8 8 8 8 8 8 9 9 0   AST U/L  --  43  --  79* 54*   ALT U/L  --  33  --  40 39   ALK PHOS U/L  --  100  --  110 119*   EGFR ml/min/1 73sq m 52 58 48 57 60       BMP:  Results from last 7 days   Lab Units 07/07/22  0432 07/06/22  0551 07/05/22  1814 07/05/22  0253 07/04/22  0107   POTASSIUM mmol/L 4 1 3 9 3 8 3 8 4 2   CHLORIDE mmol/L 103 104 101 104 103   CO2 mmol/L 28 26 25 28 22   BUN mg/dL 44* 40* 38* 33* 29*   CREATININE mg/dL 1 35* 1 24 1 44* 1 26 1 20   CALCIUM mg/dL 8 8 8 8 8 8 8 9 9 0       Lab Results   Component Value Date    NTBNP 1,062 (H) 07/04/2022    NTBNP 389 (H) 05/22/2022             Results from last 7 days   Lab Units 07/05/22  1814 07/05/22  0253 07/04/22  0107   MAGNESIUM mg/dL 1 8 2 3 1 4*                     Results from last 7 days   Lab Units 07/04/22  0107   INR  1 09       Lipid Profile:   No results found for: CHOL  Lab Results   Component Value Date    HDL 29 (L) 05/24/2022     Lab Results   Component Value Date    LDLCALC 124 (H) 05/24/2022     Lab Results   Component Value Date    TRIG 122 05/24/2022       Cardiac testing:   No results found for this or any previous visit  No results found for this or any previous visit  No results found for this or any previous visit  No valid procedures specified  No results found for this or any previous visit          Meds/Allergies   current meds:   Current Facility-Administered Medications   Medication Dose Route Frequency    acetaminophen (TYLENOL) tablet 650 mg  650 mg Oral Q6H PRN    aluminum-magnesium hydroxide-simethicone (MYLANTA) oral suspension 30 mL  30 mL Oral Q4H PRN    amiodarone tablet 200 mg  200 mg Oral TID With Meals    aspirin (ECOTRIN LOW STRENGTH) EC tablet 81 mg  81 mg Oral Daily    atorvastatin (LIPITOR) tablet 40 mg  40 mg Oral Daily With Dinner    baclofen tablet 10 mg  10 mg Oral Q6H PRN    cholestyramine sugar free (QUESTRAN LIGHT) packet 4 g  4 g Oral BID    gabapentin (NEURONTIN) capsule 300 mg  300 mg Oral BID    gabapentin (NEURONTIN) capsule 600 mg  600 mg Oral HS    insulin glargine (LANTUS) subcutaneous injection 20 Units 0 2 mL  20 Units Subcutaneous HS    insulin lispro (HumaLOG) 100 units/mL subcutaneous injection 1-6 Units  1-6 Units Subcutaneous TID AC    insulin lispro (HumaLOG) 100 units/mL subcutaneous injection 1-6 Units  1-6 Units Subcutaneous HS    insulin lispro (HumaLOG) 100 units/mL subcutaneous injection 5 Units  5 Units Subcutaneous TID With Meals    loratadine (CLARITIN) tablet 10 mg  10 mg Oral Daily    metoprolol succinate (TOPROL-XL) 24 hr tablet 25 mg  25 mg Oral Once    [START ON 7/8/2022] metoprolol succinate (TOPROL-XL) 24 hr tablet 50 mg  50 mg Oral Daily    ondansetron (ZOFRAN) injection 4 mg  4 mg Intravenous Q6H PRN    pantoprazole (PROTONIX) EC tablet 40 mg  40 mg Oral Daily Before Breakfast    potassium chloride (K-DUR,KLOR-CON) CR tablet 40 mEq  40 mEq Oral Daily    ranolazine (RANEXA) 12 hr tablet 500 mg  500 mg Oral Q12H VARSHA    rOPINIRole (REQUIP) tablet 0 5 mg  0 5 mg Oral HS    sodium chloride (PF) 0 9 % injection 3 mL  3 mL Intravenous Q8H [address]    tamsulosin (FLOMAX) capsule 0 4 mg  0 4 mg Oral Daily With Dinner    torsemide (DEMADEX) tablet 20 mg  20 mg Oral Daily     Medications Prior to Admission   Medication    acetaminophen (TYLENOL) 325 mg tablet    aspirin (ECOTRIN LOW STRENGTH) 81 mg EC tablet    atorvastatin (LIPITOR) 40 mg tablet    baclofen 10 mg tablet    cholestyramine sugar free (QUESTRAN LIGHT) 4 g packet    clopidogrel (PLAVIX) 75 mg tablet    furosemide (LASIX) 40 mg tablet    gabapentin (NEURONTIN) 300 mg capsule    gabapentin (NEURONTIN) 600 MG tablet    hydroxyurea (HYDREA) 500 mg capsule    insulin glargine (LANTUS) 100 units/mL subcutaneous injection    insulin lispro (HumaLOG) 100 units/mL injection    lisinopril (ZESTRIL) 5 mg tablet    loratadine (CLARITIN) 10 mg tablet    metoprolol succinate (TOPROL-XL) 25 mg 24 hr tablet    pantoprazole (PROTONIX) 40 mg tablet    potassium chloride (K-DUR,KLOR-CON) 20 mEq tablet    rOPINIRole (REQUIP) 0 5 mg tablet    tamsulosin (FLOMAX) 0 4 mg            Counseling / Coordination of Care  Total floor / unit time spent today 20 minutes  Greater than 50% of total time was spent with the patient and / or family counseling and / or coordination of care  ** Please Note: Dragon 360 Dictation voice to text software may have been used in the creation of this document   **

## 2022-07-07 NOTE — ASSESSMENT & PLAN NOTE
· Multiple runs of atrial tachycardia noted on telemetry  · Started on amiodarone 200 mg TID on 7/6  · Consider increasing Metoprolol XL 25 mg daily as HR remains elevated , will discuss with cardiology   · Optimize electrolytes  · Continue telemetry

## 2022-07-07 NOTE — ASSESSMENT & PLAN NOTE
Wt Readings from Last 3 Encounters:   07/07/22 90 3 kg (199 lb 1 2 oz)   05/26/22 89 3 kg (196 lb 13 9 oz)   02/25/22 94 4 kg (208 lb 3 2 oz)     · Mildly volume overloaded   · BNP 1062  · Home regimen: 40 mg lasix PO daily   · Prior ECHO 05/22: EF 45%, grade 2 diastolic dysfunction   · Repeat Echo (7/5): EF 15%, grade 2 diastolic dysfunction  · Now on torsemide 20 mg daily  · Monitor I/O and daily weight   · Continue low NA diet and Fluid restriction   · Telemetry reviewed: NSR with HR 80's  Patient with runs of Atrial tachycardia overnight  · Continue to monitor overnight  · Cardiology consulted, appreciate recommendations:  · Found to have drop in EF with regional wall motion abnormalities  Likely representing ischemic cardiomyopathy  · Conservative measures were recommended, and patient did not want to pursue cardiac cath  Continue with medical therapy   Plan to discontinue Lisinopril to up titrate anti-anginal's and beta-blockers if need be

## 2022-07-07 NOTE — ASSESSMENT & PLAN NOTE
· Intermittent Hypotension  · Home Medication: Lisinopril 5 mg daily, lasix 40 mg finch, and metoprolol succinate 25 mg daily   · Lisinopril discontinued 2/2 hypotension  · Cardiology following  · Current regimen: Metoprolol XL 25 mg daily and torsemide 20 mg daily  · Consider increasing metoprolol 2/2 continued tachycardia

## 2022-07-07 NOTE — SPEECH THERAPY NOTE
Speech Language/Pathology    Order received, chart reviewed  S/w RN who states concerns are primarily regarding taking PO meds  RN states he appears to be tolerating solids/liquids fairly well  ST to f/u for evaluation as able and appropriate within 24-48 hours

## 2022-07-07 NOTE — ASSESSMENT & PLAN NOTE
· Presented with CP that had occurred earlier in the day, now resolved   · Troponin 2212->2791  · EKG: Sinus tachycardia 113   · ER discussed with cardiology who did not recommend transfer for cardiac cath   · Unable to be placed on heparin drip due to pancytopenia  Patient prefers medical management and does not wish to undergo invasive procedures    · Repeat Echo (7/5): EF 41%, grade 2 diastolic dysfunction  · Continue with Telemetry  · Cardiology following

## 2022-07-07 NOTE — PLAN OF CARE
Problem: MOBILITY - ADULT  Goal: Maintain or return to baseline ADL function  Description: INTERVENTIONS:  -  Assess patient's ability to carry out ADLs; assess patient's baseline for ADL function and identify physical deficits which impact ability to perform ADLs (bathing, care of mouth/teeth, toileting, grooming, dressing, etc )  - Assess/evaluate cause of self-care deficits   - Assess range of motion  - Assess patient's mobility; develop plan if impaired  - Assess patient's need for assistive devices and provide as appropriate  - Encourage maximum independence but intervene and supervise when necessary  - Involve family in performance of ADLs  - Assess for home care needs following discharge   - Consider OT consult to assist with ADL evaluation and planning for discharge  - Provide patient education as appropriate  Outcome: Progressing  Goal: Maintains/Returns to pre admission functional level  Description: INTERVENTIONS:  - Perform BMAT or MOVE assessment daily    - Set and communicate daily mobility goal to care team and patient/family/caregiver  - Collaborate with rehabilitation services on mobility goals if consulted  - Perform Range of Motion 4 times a day  - Reposition patient every 2 hours    - Dangle patient 2 times a day  - Stand patient 2 times a day  - Ambulate patient 2 times a day  - Out of bed to chair 2 times a day   - Out of bed for meals 2 times a day  - Out of bed for toileting  - Record patient progress and toleration of activity level   Outcome: Progressing     Problem: Potential for Falls  Goal: Patient will remain free of falls  Description: INTERVENTIONS:  - Educate patient/family on patient safety including physical limitations  - Instruct patient to call for assistance with activity   - Consult OT/PT to assist with strengthening/mobility   - Keep Call bell within reach  - Keep bed low and locked with side rails adjusted as appropriate  - Keep care items and personal belongings within reach  - Initiate and maintain comfort rounds  - Make Fall Risk Sign visible to staff  - Offer Toileting every 2 Hours, in advance of need  - Initiate/Maintain bed alarm  - Obtain necessary fall risk management equipment  - Apply yellow socks and bracelet for high fall risk patients  - Consider moving patient to room near nurses station  Outcome: Progressing     Problem: PAIN - ADULT  Goal: Verbalizes/displays adequate comfort level or baseline comfort level  Description: Interventions:  - Encourage patient to monitor pain and request assistance  - Assess pain using appropriate pain scale  - Administer analgesics based on type and severity of pain and evaluate response  - Implement non-pharmacological measures as appropriate and evaluate response  - Consider cultural and social influences on pain and pain management  - Notify physician/advanced practitioner if interventions unsuccessful or patient reports new pain  Outcome: Progressing     Problem: INFECTION - ADULT  Goal: Absence or prevention of progression during hospitalization  Description: INTERVENTIONS:  - Assess and monitor for signs and symptoms of infection  - Monitor lab/diagnostic results  - Monitor all insertion sites, i e  indwelling lines, tubes, and drains  - Monitor endotracheal if appropriate and nasal secretions for changes in amount and color  - [place] appropriate cooling/warming therapies per order  - Administer medications as ordered  - Instruct and encourage patient and family to use good hand hygiene technique  - Identify and instruct in appropriate isolation precautions for identified infection/condition  Outcome: Progressing  Goal: Absence of fever/infection during neutropenic period  Description: INTERVENTIONS:  - Monitor WBC    Outcome: Progressing     Problem: SAFETY ADULT  Goal: Maintain or return to baseline ADL function  Description: INTERVENTIONS:  -  Assess patient's ability to carry out ADLs; assess patient's baseline for ADL function and identify physical deficits which impact ability to perform ADLs (bathing, care of mouth/teeth, toileting, grooming, dressing, etc )  - Assess/evaluate cause of self-care deficits   - Assess range of motion  - Assess patient's mobility; develop plan if impaired  - Assess patient's need for assistive devices and provide as appropriate  - Encourage maximum independence but intervene and supervise when necessary  - Involve family in performance of ADLs  - Assess for home care needs following discharge   - Consider OT consult to assist with ADL evaluation and planning for discharge  - Provide patient education as appropriate  Outcome: Progressing  Goal: Maintains/Returns to pre admission functional level  Description: INTERVENTIONS:  - Perform BMAT or MOVE assessment daily    - Set and communicate daily mobility goal to care team and patient/family/caregiver  - Collaborate with rehabilitation services on mobility goals if consulted  - Perform Range of Motion 4 times a day  - Reposition patient every 2 hours    - Dangle patient 2 times a day  - Stand patient 2 times a day  - Ambulate patient 2 times a day  - Out of bed to chair 2 times a day   - Out of bed for meals 2 times a day  - Out of bed for toileting  - Record patient progress and toleration of activity level   Outcome: Progressing  Goal: Patient will remain free of falls  Description: INTERVENTIONS:  - Educate patient/family on patient safety including physical limitations  - Instruct patient to call for assistance with activity   - Consult OT/PT to assist with strengthening/mobility   - Keep Call bell within reach  - Keep bed low and locked with side rails adjusted as appropriate  - Keep care items and personal belongings within reach  - Initiate and maintain comfort rounds  - Make Fall Risk Sign visible to staff  - Offer Toileting every 2 Hours, in advance of need  - Initiate/Maintain bed alarm  - Obtain necessary fall risk management equipment  - Apply yellow socks and bracelet for high fall risk patients  - Consider moving patient to room near nurses station  Outcome: Progressing     Problem: DISCHARGE PLANNING  Goal: Discharge to home or other facility with appropriate resources  Description: INTERVENTIONS:  - Identify barriers to discharge w/patient and caregiver  - Arrange for needed discharge resources and transportation as appropriate  - Identify discharge learning needs (meds, wound care, etc )  - Arrange for interpretive services to assist at discharge as needed  - Refer to Case Management Department for coordinating discharge planning if the patient needs post-hospital services based on physician/advanced practitioner order or complex needs related to functional status, cognitive ability, or social support system  Outcome: Progressing     Problem: Knowledge Deficit  Goal: Patient/family/caregiver demonstrates understanding of disease process, treatment plan, medications, and discharge instructions  Description: Complete learning assessment and assess knowledge base    Interventions:  - Provide teaching at level of understanding  - Provide teaching via preferred learning methods  Outcome: Progressing     Problem: CARDIOVASCULAR - ADULT  Goal: Maintains optimal cardiac output and hemodynamic stability  Description: INTERVENTIONS:  - Monitor I/O, vital signs and rhythm  - Monitor for S/S and trends of decreased cardiac output  - Administer and titrate ordered vasoactive medications to optimize hemodynamic stability  - Assess quality of pulses, skin color and temperature  - Assess for signs of decreased coronary artery perfusion  - Instruct patient to report change in severity of symptoms  Outcome: Progressing  Goal: Absence of cardiac dysrhythmias or at baseline rhythm  Description: INTERVENTIONS:  - Continuous cardiac monitoring, vital signs, obtain 12 lead EKG if ordered  - Administer antiarrhythmic and heart rate control medications as ordered  - Monitor electrolytes and administer replacement therapy as ordered  Outcome: Progressing     Problem: METABOLIC, FLUID AND ELECTROLYTES - ADULT  Goal: Electrolytes maintained within normal limits  Description: INTERVENTIONS:  - Monitor labs and assess patient for signs and symptoms of electrolyte imbalances  - Administer electrolyte replacement as ordered  - Monitor response to electrolyte replacements, including repeat lab results as appropriate  - Instruct patient on fluid and nutrition as appropriate  Outcome: Progressing  Goal: Fluid balance maintained  Description: INTERVENTIONS:  - Monitor labs   - Monitor I/O and WT  - Instruct patient on fluid and nutrition as appropriate  - Assess for signs & symptoms of volume excess or deficit  Outcome: Progressing  Goal: Glucose maintained within target range  Description: INTERVENTIONS:  - Monitor Blood Glucose as ordered  - Assess for signs and symptoms of hyperglycemia and hypoglycemia  - Administer ordered medications to maintain glucose within target range  - Assess nutritional intake and initiate nutrition service referral as needed  Outcome: Progressing     Problem: SKIN/TISSUE INTEGRITY - ADULT  Goal: Skin Integrity remains intact(Skin Breakdown Prevention)  Description: Assess:  -Perform Yovani assessment every shift  -Clean and moisturize skin every day  -Inspect skin when repositioning, toileting, and assisting with ADLS  -Assess under medical devices  every 4 hours  -Assess extremities for adequate circulation and sensation     Bed Management:  -Have minimal linens on bed & keep smooth, unwrinkled  -Change linens as needed when moist or perspiring  -Avoid sitting or lying in one position for more than 2 hours while in bed  -Keep HOB at 30 degrees     Toileting:  -Offer bedside commode  -Assess for incontinence every 2 hours  -Use incontinent care products after each incontinent episode such as barrier cream    Activity:  -Mobilize patient 2 times a day  -Encourage activity and walks on unit  -Encourage or provide ROM exercises   -Turn and reposition patient every 2 Hours  -Use appropriate equipment to lift or move patient in bed  -Instruct/ Assist with weight shifting every 1 hour when out of bed in chair  -Consider limitation of chair time 4 hour intervals    Skin Care:  -Avoid use of baby powder, tape, friction and shearing, hot water or constrictive clothing  -Relieve pressure over bony prominences using Allevyn, pillows  -Do not massage red bony areas    Next Steps:  -Teach patient strategies to minimize risks such as frequent weight shifting  -Consider consults to  interdisciplinary teams such as nutrition, PT, wound care  Outcome: Progressing  Goal: Pressure injury heals and does not worsen  Description: Interventions:  - Implement low air loss mattress or specialty surface (Criteria met)  - Apply silicone foam dressing  - Instruct/assist with weight shifting every 60 minutes when in chair   - Limit chair time to 4 hour intervals  - Use special pressure reducing interventions such as cushion when in chair   - Apply fecal or urinary incontinence containment device   - Perform passive or active ROM every 4 hours  - Turn and reposition patient & offload bony prominences every 2 hours   - Utilize friction reducing device or surface for transfers   - Consider consults to  interdisciplinary teams such as nutrition, PT, wound care  - Use incontinent care products after each incontinent episode such as barrier cream  - Consider nutrition services referral as needed  Outcome: Progressing     Problem: HEMATOLOGIC - ADULT  Goal: Maintains hematologic stability  Description: INTERVENTIONS  - Assess for signs and symptoms of bleeding or hemorrhage  - Monitor labs  - Administer supportive blood products/factors as ordered and appropriate  Outcome: Progressing     Problem: Prexisting or High Potential for Compromised Skin Integrity  Goal: Skin integrity is maintained or improved  Description: INTERVENTIONS:  - Identify patients at risk for skin breakdown  - Assess and monitor skin integrity  - Assess and monitor nutrition and hydration status  - Monitor labs   - Assess for incontinence   - Turn and reposition patient  - Assist with mobility/ambulation  - Relieve pressure over bony prominences  - Avoid friction and shearing  - Provide appropriate hygiene as needed including keeping skin clean and dry  - Evaluate need for skin moisturizer/barrier cream  - Collaborate with interdisciplinary team   - Patient/family teaching  - Consider wound care consult   Outcome: Progressing

## 2022-07-08 VITALS
DIASTOLIC BLOOD PRESSURE: 47 MMHG | HEART RATE: 79 BPM | WEIGHT: 199 LBS | HEIGHT: 70 IN | BODY MASS INDEX: 28.49 KG/M2 | OXYGEN SATURATION: 94 % | TEMPERATURE: 97.9 F | RESPIRATION RATE: 14 BRPM | SYSTOLIC BLOOD PRESSURE: 95 MMHG

## 2022-07-08 LAB
ALBUMIN SERPL BCP-MCNC: 3.5 G/DL (ref 3.5–5)
ALP SERPL-CCNC: 110 U/L (ref 46–116)
ALT SERPL W P-5'-P-CCNC: 25 U/L (ref 12–78)
ANION GAP SERPL CALCULATED.3IONS-SCNC: 9 MMOL/L (ref 4–13)
AST SERPL W P-5'-P-CCNC: 23 U/L (ref 5–45)
BILIRUB SERPL-MCNC: 1 MG/DL (ref 0.2–1)
BUN SERPL-MCNC: 47 MG/DL (ref 5–25)
CA-I BLD-SCNC: 1.12 MMOL/L (ref 1.12–1.32)
CALCIUM SERPL-MCNC: 8.9 MG/DL (ref 8.3–10.1)
CHLORIDE SERPL-SCNC: 101 MMOL/L (ref 100–108)
CO2 SERPL-SCNC: 27 MMOL/L (ref 21–32)
CREAT SERPL-MCNC: 1.53 MG/DL (ref 0.6–1.3)
ERYTHROCYTE [DISTWIDTH] IN BLOOD BY AUTOMATED COUNT: 19.7 % (ref 11.6–15.1)
FLUAV RNA RESP QL NAA+PROBE: NEGATIVE
FLUBV RNA RESP QL NAA+PROBE: NEGATIVE
GFR SERPL CREATININE-BSD FRML MDRD: 45 ML/MIN/1.73SQ M
GLUCOSE SERPL-MCNC: 125 MG/DL (ref 65–140)
GLUCOSE SERPL-MCNC: 144 MG/DL (ref 65–140)
GLUCOSE SERPL-MCNC: 198 MG/DL (ref 65–140)
GLUCOSE SERPL-MCNC: 232 MG/DL (ref 65–140)
HCT VFR BLD AUTO: 23.7 % (ref 36.5–49.3)
HGB BLD-MCNC: 7.6 G/DL (ref 12–17)
MAGNESIUM SERPL-MCNC: 2.1 MG/DL (ref 1.6–2.6)
MCH RBC QN AUTO: 28 PG (ref 26.8–34.3)
MCHC RBC AUTO-ENTMCNC: 32.1 G/DL (ref 31.4–37.4)
MCV RBC AUTO: 88 FL (ref 82–98)
PHOSPHATE SERPL-MCNC: 5.4 MG/DL (ref 2.3–4.1)
PLATELET # BLD AUTO: 144 THOUSANDS/UL (ref 149–390)
PMV BLD AUTO: 12.4 FL (ref 8.9–12.7)
POTASSIUM SERPL-SCNC: 4 MMOL/L (ref 3.5–5.3)
PROT SERPL-MCNC: 7.4 G/DL (ref 6.4–8.2)
RBC # BLD AUTO: 2.71 MILLION/UL (ref 3.88–5.62)
RSV RNA RESP QL NAA+PROBE: NEGATIVE
SARS-COV-2 RNA RESP QL NAA+PROBE: NEGATIVE
SODIUM SERPL-SCNC: 137 MMOL/L (ref 136–145)
WBC # BLD AUTO: 2.09 THOUSAND/UL (ref 4.31–10.16)

## 2022-07-08 PROCEDURE — 82330 ASSAY OF CALCIUM: CPT | Performed by: STUDENT IN AN ORGANIZED HEALTH CARE EDUCATION/TRAINING PROGRAM

## 2022-07-08 PROCEDURE — 85027 COMPLETE CBC AUTOMATED: CPT | Performed by: STUDENT IN AN ORGANIZED HEALTH CARE EDUCATION/TRAINING PROGRAM

## 2022-07-08 PROCEDURE — 84100 ASSAY OF PHOSPHORUS: CPT | Performed by: STUDENT IN AN ORGANIZED HEALTH CARE EDUCATION/TRAINING PROGRAM

## 2022-07-08 PROCEDURE — 97530 THERAPEUTIC ACTIVITIES: CPT

## 2022-07-08 PROCEDURE — 82948 REAGENT STRIP/BLOOD GLUCOSE: CPT

## 2022-07-08 PROCEDURE — 80053 COMPREHEN METABOLIC PANEL: CPT | Performed by: STUDENT IN AN ORGANIZED HEALTH CARE EDUCATION/TRAINING PROGRAM

## 2022-07-08 PROCEDURE — 99239 HOSP IP/OBS DSCHRG MGMT >30: CPT | Performed by: PHYSICIAN ASSISTANT

## 2022-07-08 PROCEDURE — 92610 EVALUATE SWALLOWING FUNCTION: CPT

## 2022-07-08 PROCEDURE — 83735 ASSAY OF MAGNESIUM: CPT | Performed by: STUDENT IN AN ORGANIZED HEALTH CARE EDUCATION/TRAINING PROGRAM

## 2022-07-08 PROCEDURE — 0241U HB NFCT DS VIR RESP RNA 4 TRGT: CPT | Performed by: PHYSICIAN ASSISTANT

## 2022-07-08 PROCEDURE — 99232 SBSQ HOSP IP/OBS MODERATE 35: CPT | Performed by: INTERNAL MEDICINE

## 2022-07-08 RX ORDER — AMIODARONE HYDROCHLORIDE 200 MG/1
200 TABLET ORAL
Qty: 90 TABLET | Refills: 0 | Status: SHIPPED | OUTPATIENT
Start: 2022-07-08 | End: 2022-07-08 | Stop reason: SDUPTHER

## 2022-07-08 RX ORDER — TORSEMIDE 20 MG/1
20 TABLET ORAL DAILY
Qty: 60 TABLET | Refills: 0 | Status: SHIPPED | OUTPATIENT
Start: 2022-07-09

## 2022-07-08 RX ORDER — AMIODARONE HYDROCHLORIDE 200 MG/1
TABLET ORAL
Qty: 53 TABLET | Refills: 0 | Status: SHIPPED | OUTPATIENT
Start: 2022-07-08 | End: 2022-08-15

## 2022-07-08 RX ORDER — METOPROLOL SUCCINATE 50 MG/1
50 TABLET, EXTENDED RELEASE ORAL DAILY
Qty: 30 TABLET | Refills: 0 | Status: SHIPPED | OUTPATIENT
Start: 2022-07-09

## 2022-07-08 RX ORDER — RANOLAZINE 500 MG/1
500 TABLET, EXTENDED RELEASE ORAL EVERY 12 HOURS SCHEDULED
Qty: 60 TABLET | Refills: 0 | Status: SHIPPED | OUTPATIENT
Start: 2022-07-08

## 2022-07-08 RX ADMIN — ASPIRIN 81 MG: 81 TABLET, COATED ORAL at 08:36

## 2022-07-08 RX ADMIN — INSULIN LISPRO 2 UNITS: 100 INJECTION, SOLUTION INTRAVENOUS; SUBCUTANEOUS at 16:54

## 2022-07-08 RX ADMIN — GABAPENTIN 300 MG: 300 CAPSULE ORAL at 08:36

## 2022-07-08 RX ADMIN — SODIUM CHLORIDE 3 ML: 9 INJECTION, SOLUTION INTRAMUSCULAR; INTRAVENOUS; SUBCUTANEOUS at 06:27

## 2022-07-08 RX ADMIN — PANTOPRAZOLE SODIUM 40 MG: 40 TABLET, DELAYED RELEASE ORAL at 06:27

## 2022-07-08 RX ADMIN — METOPROLOL SUCCINATE 50 MG: 50 TABLET, EXTENDED RELEASE ORAL at 08:50

## 2022-07-08 RX ADMIN — INSULIN LISPRO 3 UNITS: 100 INJECTION, SOLUTION INTRAVENOUS; SUBCUTANEOUS at 11:59

## 2022-07-08 RX ADMIN — INSULIN LISPRO 5 UNITS: 100 INJECTION, SOLUTION INTRAVENOUS; SUBCUTANEOUS at 12:00

## 2022-07-08 RX ADMIN — AMIODARONE HYDROCHLORIDE 200 MG: 200 TABLET ORAL at 11:55

## 2022-07-08 RX ADMIN — TAMSULOSIN HYDROCHLORIDE 0.4 MG: 0.4 CAPSULE ORAL at 16:54

## 2022-07-08 RX ADMIN — CHOLESTYRAMINE 4 G: 4 POWDER, FOR SUSPENSION ORAL at 17:53

## 2022-07-08 RX ADMIN — GABAPENTIN 300 MG: 300 CAPSULE ORAL at 17:51

## 2022-07-08 RX ADMIN — INSULIN LISPRO 5 UNITS: 100 INJECTION, SOLUTION INTRAVENOUS; SUBCUTANEOUS at 08:39

## 2022-07-08 RX ADMIN — RANOLAZINE 500 MG: 500 TABLET, EXTENDED RELEASE ORAL at 08:36

## 2022-07-08 RX ADMIN — ATORVASTATIN CALCIUM 40 MG: 40 TABLET, FILM COATED ORAL at 16:54

## 2022-07-08 RX ADMIN — TORSEMIDE 20 MG: 20 TABLET ORAL at 08:36

## 2022-07-08 RX ADMIN — INSULIN LISPRO 5 UNITS: 100 INJECTION, SOLUTION INTRAVENOUS; SUBCUTANEOUS at 16:55

## 2022-07-08 RX ADMIN — AMIODARONE HYDROCHLORIDE 200 MG: 200 TABLET ORAL at 16:54

## 2022-07-08 RX ADMIN — CHOLESTYRAMINE 4 G: 4 POWDER, FOR SUSPENSION ORAL at 08:37

## 2022-07-08 RX ADMIN — LORATADINE 10 MG: 10 TABLET ORAL at 08:36

## 2022-07-08 RX ADMIN — AMIODARONE HYDROCHLORIDE 200 MG: 200 TABLET ORAL at 08:36

## 2022-07-08 RX ADMIN — POTASSIUM CHLORIDE 40 MEQ: 1500 TABLET, EXTENDED RELEASE ORAL at 08:37

## 2022-07-08 NOTE — ASSESSMENT & PLAN NOTE
Lab Results   Component Value Date    EGFR 45 07/08/2022    EGFR 52 07/07/2022    EGFR 58 07/06/2022    CREATININE 1 53 (H) 07/08/2022    CREATININE 1 35 (H) 07/07/2022    CREATININE 1 24 07/06/2022   · Stable  · Baseline appears to be about 1 1-1 3  · Patient with new baseline creatinine to maintain euvolemia approximately 1 3-1 5  · Continue to monitor while receiving diuresis  · Monitor UOP

## 2022-07-08 NOTE — SPEECH THERAPY NOTE
Speech Language/Pathology  Speech/Language Pathology  Assessment    Patient Name: Danielle Velasquez  [id number]'[id number] Date: 7/8/2022     Problem List  Principal Problem:    Pancytopenia ([facility] [address] )  Active Problems:    Multiple sclerosis ([facility] )    HTN (hypertension)    Coronary artery disease involving native coronary artery    Elevated troponin    Type 2 diabetes mellitus, with long-term current use of insulin (HCC)    SIRS (systemic inflammatory response syndrome) (HCC)    Acute on chronic combined systolic and diastolic congestive heart failure (HCC)    Stage 3 chronic kidney disease, unspecified whether stage 3a or 3b CKD ([facility] )    Hypomagnesemia    Atrial tachycardia (HCC)    Past Medical History  Past Medical History:   Diagnosis Date    [facility])     BPH (benign prostatic hyperplasia)     Congestive heart failure (CHF) ([place] [address] )     Diabetes mellitus ([facility] )     Hyperlipidemia     Hypertension     Multiple sclerosis (HCC)     Neuropathy     RLS (restless legs syndrome)      Past Surgical History  History reviewed  No pertinent surgical history  Bedside Swallow Evaluation:    Summary:  Pt presents w/ WFL oral swallow skill, suspected at least mild pharyngeal dysphagia  Pt w/ WFL mastication and bolus formation, control, and transfer  Mild oral residue noted w/ soft and hard solids, cleared w/ liquid wash or bite of puree  Pt w/ suspected prompt swallow initiation  Laryngeal rise WFL to palpation  No overt s/s aspiration w/ solids or thin liquids in isolation today  Pt w/ cough x2 w/ pill w/ thin  Pt required several minutes to recover  Pt resistive to ST education on options for potentially safer intake of PO meds  Pt states he has previously tried crushed pills in puree and dislikes it  Pt educated on s/s of aspiration, risks associated, and recommendations and rationale for modifying how he intakes his pills   Pt verbalized understanding and at this time declines alternative presentation for medications, accepts all risks associated  Pt educated on strategies to optimize swallow safety and s/s aspiration  Pt educated to monitor and notify his medical team should s/s arise  Pt verbalized understanding and agreeable  Recommendations:  Diet: Regular  Liquid: Thin  Meds: Whole w/ liquid per pt request, ST recommend crushed in puree  Supervision: Assist/set up as needed   Positioning:Upright  Strategies: Pt to take PO/Meds only when fully alert and upright  Oral care: 3-4x daily  Aspiration precautions  Reflux precautions    Therapy Prognosis: Fair  Prognosis considerations: Age, current medical, progressive disease process, therapeutic potential   Frequency: Speech to follow peripherally for ongoing education as needed      H&P/Admit info/ pertinent provider notes: (PMH noted above)  Chief Complaint   Patient presents with    Chest Pain       Pt c/o of chest pain and arm numbness       79-year-old male past medical history MS, right sacral decubitus, pulmonary edema, insulin-dependent diabetes, restless leg, CHF, AFib brought in by ambulance from local nursing home with shortness of breath and left chest pressure sensation 3-4 hours ago improved now with a dose of aspirin  Symptoms occurred at rest   He had associated wet cough  He has some generalized abdominal pain  He has bilateral shoulder pain and weakness  Similar symptoms were he was admitted to the hospital several months ago  HPI/24hr events 7/7: Started on amiodarone by Cardiology 2/2 runs of atrial tachycardia on telemetry  In sinus rhythm this morning but continues with episodes of tachycardia  No acute events overnight  Disposition: Continue MS telemetry  Code Status: Level 3 - DNAR and DNI  ---------------------------------------------------------------------------------------  SUBJECTIVE  Patient states that he feels well this morning but does note some mild nausea  He was able to eat breakfast and feels that eating helped   He is out of bed sitting in the chair  Special Studies:  CXR 7/4: Moderate pulmonary edema  Previous VBS: No      Goal(s):  Pt will tolerate least restrictive diet w/out s/s aspiration or oral/pharyngeal difficulties  Patient's goal: "Stop playing these games"    Did the pt report pain? No  If yes, was nursing notified/was it addressed? N/A    Reason for consult:  R/o aspiration  Determine safest and least restrictive diet  H/o neurological disease  Nursing reported cough w/ pills    Food allergies: None    Current diet: Regular w/ thin    Premorbid diet::Regular w/ thin    O2 requirement: RA    Voice/Speech: Clear    Social: [facility]     Follows commands: Yes                          Cognitive Status: Awake and alert     Oral mech exam:  Dentition: Limited  Labial strength and ROM: WFL  Lingual strength and ROM: WFL  Mandibular strength and ROM: WFL  Velum: symmetrical  Secretion management: WFL    Items administered:  Puree, soft solid, hard solid, thin liquids, meds whole/thin  Liquids were taken by straw      Oral stage:  Lip closure: WFL  Mastication: WFL  Bolus formation: WFL  Bolus control: WFL  Transfer: WFL  Oral residue: Yes, mild w/ soft and hard solids, cleared w/ liquid wash or bite of puree  Pocketing: No    Pharyngeal stage:  Swallow promptness: Suspected prompt  Laryngeal rise: WFL to palpation   Wet voice: No  Throat clear: No  Cough: Yes, x2 w/ pill w/ thin, no overt s/s aspiration w/ thin liquids in isolation   Secondary swallows: No  Audible swallows: No    Esophageal stage:  No s/s reported    Results d/w:  Pt, nursing,  physician

## 2022-07-08 NOTE — ASSESSMENT & PLAN NOTE
· Intermittent Hypotension  · Home Medication: Lisinopril 5 mg daily, lasix 40 mg finch, and metoprolol succinate 25 mg daily   · Lisinopril discontinued secondary to hypotension  · Cardiology following  · Current regimen: Metoprolol XL 50 mg daily and torsemide 20 mg daily

## 2022-07-08 NOTE — CASE MANAGEMENT
Case Management Discharge Planning Note    Patient name Gracie Dsouza  Location /-80 MRN [id number]  : 1950 Date 2022       Current Admission Date: 2022  Current Admission Diagnosis:Pancytopenia [facility])   Patient Active Problem List    Diagnosis Date Noted    Atrial tachycardia ([facility] ) 2022    Pancytopenia ([facility] ) 2022    Hypomagnesemia 2022    Lactic acidosis 2022    PVD (peripheral vascular disease) ([address] ) 2022    Stage 3 chronic kidney disease, unspecified whether stage 3a or 3b CKD ([facility] [facility] ) 2022    Pure hypercholesterolemia 2022    Ischemic cardiomyopathy 2022    Abnormal echocardiogram 2022    Acute on chronic combined systolic and diastolic congestive heart failure ([facility] [address] ) 2022    Acute pulmonary edema ([facility] [address] ) 2022    Acute respiratory failure ([facility] [address] ) 2022    Coronary artery disease involving native coronary artery 2022    Elevated troponin 2022    Type 2 diabetes mellitus, with long-term current use of insulin ([facility] [address] ) 2022    Essential (hemorrhagic) thrombocythemia ([facility] [address] ) 2022    SIRS (systemic inflammatory response syndrome) ([facility] [address] ) 2022    Diarrhea 2022    Multiple sclerosis ([facility] [address] ) 2022    Antiplatelet or antithrombotic long-term use 2022    HTN (hypertension) 2017      LOS (days): 4  Geometric Mean LOS (GMLOS) (days): 5 40  Days to GMLOS:1     OBJECTIVE:  Risk of Unplanned Readmission Score: 22 02         Current admission status: Inpatient   Preferred Pharmacy:   [address] [place] [address] [address] [place] [address] [id number]  Phone: [phone] Fax: [phone]    Primary Care Provider: Joan Barajas MD    Primary Insurance: MEDICARE  Secondary Insurance: Annemarie SAVAGE [facility]    DISCHARGE DETAILS:  Continuing to follow patient   At rounds, patient was determined to be ready for discharge  Arranged SLETS BLS to [facility] at [facility]   Notified patient, Domingo Rickeybhavani, his nurse, Leilani Bowers of [facility] and left MON for his wife, Leilani Bowers at   IMM discussed with patient, signed and copy given to patient

## 2022-07-08 NOTE — DISCHARGE SUMMARY
[place]  Discharge- [place] Cumber 1950, 70 y o  male MRN: [id number]  Unit/Bed#: -01 Encounter: [id number]  Primary Care Provider: Alison Starr MD   Date and time admitted to hospital: 7/4/2022 12:52 AM    * Pancytopenia ([address] )  Assessment & Plan  · On admission: WBC 1 51, Hg 7 2  · Recent admission platelets had been 1986  Had been started on hydrea   · Continue to hold hydrea   · S/p 1 unit prbcs ordered by ED  · No signs of active bleeding   · Hgb this morning down to 7 1-> repeat pending  · Consider transfusion of 1 u prbc if hgb remains around 7 0 given extensive cardiac hx  · Will check stool for blood  · Hematology consulted, appreciate recommendations:  · Hemolysis workup negative  Renal function normal  T-bili slightly elevated but improving  · Continue to hold Hydrea x1 week  Continue to monitor CBC while admitted  · Can resume Hydrea at 500 mg b i d   After 1 week  · Monitor CBC q2 week  · Close outpatient follow-up    Atrial tachycardia [facility])  Assessment & Plan  · Multiple runs of atrial tachycardia noted on telemetry earlier in admission  · Started on amiodarone 200 mg TID on 7/6  · Toprol increased to Toprol 50 mg daily  · Optimize electrolytes  · Continue telemetry  · Improved    Hypomagnesemia  Assessment & Plan  · Monitor/Replete PRN    Stage 3 chronic kidney disease, unspecified whether stage 3a or 3b CKD [facility])  Assessment & Plan  Lab Results   Component Value Date    EGFR 45 07/08/2022    EGFR 52 07/07/2022    EGFR 58 07/06/2022    CREATININE 1 53 (H) 07/08/2022    CREATININE 1 35 (H) 07/07/2022    CREATININE 1 24 07/06/2022   · Stable  · Baseline appears to be about 1 1-1 3  · Patient with new baseline creatinine to maintain euvolemia approximately 1 3-1 5  · Continue to monitor while receiving diuresis  · Monitor UOP    Acute on chronic combined systolic and diastolic congestive heart failure (HCC)  Assessment & Plan  Wt Readings from Last 3 Encounters:   07/08/22 90 3 kg (199 lb)   05/26/22 89 3 kg (196 lb 13 9 oz)   02/25/22 94 4 kg (208 lb 3 2 oz)     · Mildly volume overloaded   · BNP 1062  · Home regimen: 40 mg lasix PO daily   · Prior ECHO 05/22: EF 27%, grade 2 diastolic dysfunction   · Repeat Echo (7/5): EF 63%, grade 2 diastolic dysfunction  · Now on torsemide 20 mg daily  · Monitor I/O and daily weight   · Continue low NA diet and Fluid restriction   · Telemetry reviewed  · Cardiology consulted, appreciate recommendations:  · Found to have drop in EF with regional wall motion abnormalities  Likely representing ischemic cardiomyopathy  · Conservative measures were recommended, and patient did not want to pursue cardiac cath  Continue with medical therapy  Discontinue Lisinopril to up titrate anti-anginal's and beta-blockers    SIRS (systemic inflammatory response syndrome) (HCC)  Assessment & Plan  · SIRS criteria: Tachycardia, Leukopenia (WBC 1 51)   · Likely related to cardiac arrhythmia and pancytopenia  · Lactic cleared  · Procal WNL   · COVID/flu/RSV: Negative   · UA: Negative   · Do not suspect acute infection     · Patient with new pancytopenia after being started on hydrea   · Monitor CBC/ Fever curve      Type 2 diabetes mellitus, with long-term current use of insulin [facility])  Assessment & Plan  Lab Results   Component Value Date    HGBA1C 5 3 05/22/2022       Recent Labs     07/07/22  1607 07/07/22  2049 07/08/22  0732 07/08/22  1131   POCGLU 172* 159* 144* 232*       Blood Sugar Average: Last 72 hrs:  · (P) 953 [id number]   · Continue Home regimen:   · Lantus 20 units hs, humalog 5 units tid   · Continue Accu-checks and SSI AC/HS  · Hypoglycemic protocol  · Diabetic diet    Elevated troponin  Assessment & Plan  · Presented with CP that had occurred earlier in the day, now resolved   · Troponin 2212->2791  · EKG: Sinus tachycardia 113   · ER discussed with cardiology who did not recommend transfer for cardiac cath   · Unable to be placed on heparin drip due to pancytopenia  Patient prefers medical management and does not wish to undergo invasive procedures  · Repeat Echo (7/5): EF 68%, grade 2 diastolic dysfunction  · Continue with Telemetry  · Cardiology following     Coronary artery disease involving native coronary artery  Assessment & Plan  · Continue Home Medication:  · Aspirin, statin, and BB    HTN (hypertension)  Assessment & Plan  · Intermittent Hypotension  · Home Medication: Lisinopril 5 mg daily, lasix 40 mg finch, and metoprolol succinate 25 mg daily   · Lisinopril discontinued secondary to hypotension  · Cardiology following  · Current regimen: Metoprolol XL 50 mg daily and torsemide 20 mg daily    Multiple sclerosis (HCC)  Assessment & Plan  · Continue Home regimen:   · Baclofen and requip  · Chronic leg weakness, baseline   · Bed bound at baseline      Medical Problems             Resolved Problems  Date Reviewed: 7/8/2022   None               Discharging Physician / Practitioner: Mariaa Harper PA-C  PCP: Zeenat Virk MD  Admission Date:   Admission Orders (From admission, onward)     Ordered        07/04/22 0355  Inpatient Admission  Once                      Discharge Date: 07/08/22    Consultations During Hospital Stay:  · Cardiology  · Hematology-Oncology    Procedures Performed:   · None    Significant Findings / Test Results:   · Echo 7/5:     Left Ventricle: Left ventricular cavity size is normal  Wall thickness is not well visualized  The left ventricular ejection fraction is 40% by visual estimation  Systolic function is mildly reduced  Diastolic function is moderately abnormal, consistent with grade II (pseudonormal) relaxation    The following segments are akinetic: apical septal, apical inferior and apex    The following segments are hypokinetic: basal inferoseptal, mid anteroseptal, mid inferoseptal and apical anterior    All other segments are normal     Mitral Valve:  There is mild to moderate regurgitation  · CXR 7/4: Moderate pulmonary edema    Incidental Findings:   · None     Test Results Pending at Discharge (will require follow up): · None     Outpatient Tests Requested:  · Q2 week CBC    Complications:  None    Reason for Admission:  Pancytopenia, volume overload    Hospital Course:   Rahel Landin is a 70 y o  male patient with past medical history of CHF, CAD, hypertension, MS, CKD 3, diabetes mellitus type 2 who originally presented to the hospital on 7/4/2022 due to shortness of breath chest pain  Patient follow left-sided chest pressure occurring a few hours prior to arrival to the emergency department  Did not require supplemental oxygen but there was evidence of volume admission  Cardiology consulted  Managed IV Lasix, ultimately transitioned back to oral diuretic  Also found to be pancytopenic  Had recently started [facility] outpatient  Hematology to monitor closely after discharge  Holding Hydrea for 1 week vanc can resume at reduced dose with 2 to weekly monitoring of CBC  Hemodynamically stable at time of discharge and appropriate for outpatient follow-up  Please see above list of diagnoses and related plan for additional information  Condition at Discharge: stable    Discharge Day Visit / Exam:   Subjective:  Patient denies shortness of breath, agreeable to return to facility  Offers no additional complaints or questions at this time  Vitals: Blood Pressure: 109/60 (07/08/22 0759)  Pulse: 80 (07/08/22 0759)  Temperature: 97 8 °F (36 6 °C) (07/08/22 0759)  Temp Source: Oral (07/07/22 2055)  Respirations: 16 (07/08/22 0759)  Height: 5' 10" (177 8 cm) (07/05/22 1324)  Weight - Scale: 90 3 kg (199 lb) (07/08/22 0544)  SpO2: 96 % (07/08/22 0759)  Exam:   Physical Exam  Vitals and nursing note reviewed  Constitutional:       General: He is not in acute distress  Appearance: Normal appearance  He is well-developed  HENT:      Head: Normocephalic and atraumatic  Eyes:      General: No scleral icterus  Conjunctiva/sclera: Conjunctivae normal    Cardiovascular:      Rate and Rhythm: Normal rate and regular rhythm  Heart sounds: Murmur heard  Pulmonary:      Effort: Pulmonary effort is normal       Breath sounds: No wheezing, rhonchi or rales  Abdominal:      General: There is no distension  Palpations: Abdomen is soft  Skin:     General: Skin is warm and dry  Neurological:      General: No focal deficit present  Mental Status: He is alert  Psychiatric:         Mood and Affect: Mood normal         Discussion with Family: Patient declined call to   Discharge instructions/Information to patient and family:   See after visit summary for information provided to patient and family  Provisions for Follow-Up Care:  See after visit summary for information related to follow-up care and any pertinent home health orders  Disposition:   [facility] Readmission: None     Discharge Statement:  I spent 65 minutes discharging the patient  This time was spent on the day of discharge  I had direct contact with the patient on the day of discharge  Greater than 50% of the total time was spent examining patient, answering all patient questions, arranging and discussing plan of care with patient as well as directly providing post-discharge instructions  Additional time then spent on discharge activities  Discharge Medications:  See after visit summary for reconciled discharge medications provided to patient and/or family        **Please Note: This note may have been constructed using a voice recognition system**

## 2022-07-08 NOTE — ASSESSMENT & PLAN NOTE
· On admission: WBC 1 51, Hg 7 2  · Recent admission platelets had been 8953  Had been started on hydrea   · Continue to hold hydrea   · S/p 1 unit prbcs ordered by ED  · No signs of active bleeding   · Hgb this morning down to 7 1-> repeat pending  · Consider transfusion of 1 u prbc if hgb remains around 7 0 given extensive cardiac hx  · Will check stool for blood  · Hematology consulted, appreciate recommendations:  · Hemolysis workup negative  Renal function normal  T-bili slightly elevated but improving  · Continue to hold Hydrea x1 week  Continue to monitor CBC while admitted  · Can resume Hydrea at 500 mg b i d   After 1 week  · Monitor CBC q2 week  · Close outpatient follow-up

## 2022-07-08 NOTE — PROGRESS NOTES
General Cardiology   Progress Note -  Team One   Mary Kay Garcia 70 y o  male MRN: [id number]    Unit/Bed#: -01 Encounter: [id number]    Assessment:     Acute HFmEF  · Home med: lasix 40 mg QD  · IV lasix 40 mg BID transitioned to PO torsemide 20 mg QD as of AM of 7/6/2022; creat slightly elevated today but may need to tolerate a higher creat to remain euvolemic  · I/O: no UO recorded for night shift, UO for day shift was 994 cc  · Weight this AM is 199 lbs which is stable from yesterday; weight at last discharge after CHF admit was 196 lbs  · TTE as below  · Daily weights; strict I/O's; 2 g Na restricted diet with fluid restriction     Elevated troponin   · EKG on admit sinus tachycardia with PACs, rate of 113; nonspecific ST/T-wave changes noted  · hsTn 2,212->2,791->4,556  · Patient's case reviewed with Dr Blanche Bentley and elevated troponins likely secondary to acute CHF exacerbation; regardless, patient refuses cardiac catheterization and wishes to be medically managed  · Cannot start IV heparin drip secondary to pancytopenia     Cardiomyopathy  · TTE from 7/5/2022; EF 40% (had been 45% in 5/2022 and 55% in 2017); akinesis of the apical septal, apical inferior walls and apex; hypokinesis of the basal inferior septal, mid anterior septal, mid inferior septal and apical anterior walls; mild-to-moderate MR  · Patient wishes to pursue medical management and does not want to pursue cardiac catheterization  · GDMT: Toprol XL 50 mg QD and torsemide 20 mg QD; had to d/c lisinopril to make room for increase in beta blocker for Atach    Supraventricular tachycardia/Atach  · Since late afternoon on 7/5, the patient has been having brief episodes of a supraventricular tachycardic rhythm (possibly Atach) with rates up into the 120s  · These episodes are short lived but did last roughly an hour that evening  · PO amiodarone 200 mg TID started yesterday afternoon per Dr Miguel Johnson  · At time of exam yesterday AM he is in sinus rhythm with occasional PACs with rates in the 90s but per staff, he has had increased rates up into the 120s intermittently  ·  Toprol XL increased to 50 mg QD yesterday AM  · Tele this AM is NSR with rates in the 80's; no further high rate episodes noted since his transfer to [facility] surg    Essential HTN  · SBP averaging 100's-110's  · Home meds: Toprol XL 50 mg QD and torsemide 20 mg QD     CAD  · Noted on paperwork from facility; no prior documentation  · GDMT: aspirin 81 mg QD, Plavix 75 mg QD; Toprol XL 50 mg QD and Lipitor 40 mg QD   · Ranexa 500 mg BID started yesterday secondary to the patient noting chest discomfort on admission with probable underlying CAD    CKD stage 3  · Baseline creat 1 3  · Creat on admit 1 2  · Creat this AM is 1 5 up from 1 3 yesterday; may need to tolerate higher creat for euvolemia     DM2  · HgbA1c 5 3  · Management per primary team     pancytopenia  · WBC's 1 5; Hgb 7 2 and PLT 45  · Prior hx of thrombocytosis noted in 2017  · Started on hydroxurea during last admit by Hem/Onc  · 1 unit PRBC's in ED this admit  · Hematology consulted  · On ASA/Plavix as outpatient; Plavix is on hold   · This AM Hgb is 7 6     Multiple sclerosis       Plan/Recommendations:  · continue torsemide 20 mg   · Continue Toprol XL to 50 mg QD for Atach  · We d/c'd home dose of lisinopril to make room for increase in BB  · Continue amiodarone 200 mg TID until 7/13 then reduce to BID dosing until 7/20 and then on 7/21 take only once daily  · Continue Ranexa 500 mg BID  · Continue remainder of home medications    _______________________________________________________________________    Subjective  Patient offers no acute complaints this morning  No shortness of breath at rest and no lower extremity edema  Review of Systems   Constitutional: Negative for chills, fever and malaise/fatigue  HENT: Negative for congestion  Cardiovascular: Positive for dyspnea on exertion   Negative for chest pain, leg swelling, orthopnea and palpitations  Respiratory: Negative for cough, shortness of breath (no SOB at rest) and wheezing  Endocrine: Negative  Hematologic/Lymphatic: Negative  Skin: Negative  Gastrointestinal: Negative for bloating, abdominal pain, nausea and vomiting  Genitourinary: Negative  Neurological: Negative for dizziness and light-headedness  Psychiatric/Behavioral: Negative  All other systems reviewed and are negative  Objective:   Vitals: Blood pressure 109/60, pulse 80, temperature 97 8 °F (36 6 °C), resp  rate 16, height 5' 10" (1 778 m), weight 90 3 kg (199 lb), SpO2 96 %  ,     Wt Readings from Last 3 Encounters:   07/08/22 90 3 kg (199 lb)   05/26/22 89 3 kg (196 lb 13 9 oz)   02/25/22 94 4 kg (208 lb 3 2 oz)        Lab Results   Component Value Date    CREATININE 1 53 (H) 07/08/2022    CREATININE 1 35 (H) 07/07/2022    CREATININE 1 24 07/06/2022         Body mass index is 28 55 kg/m²  ,     Systolic (20QVX), UJT:369 , Min:99 , UXH:948     Diastolic (24CFN), RUW:77, Min:55, Max:75          Intake/Output Summary (Last 24 hours) at 7/8/2022 0833  Last data filed at 7/8/2022 3382  Gross per 24 hour   Intake 823 ml   Output 994 ml   Net -171 ml     Weight (last 2 days)     Date/Time Weight    07/08/22 0544 90 3 (199)    07/07/22 0600 90 3 (199 08)    07/06/22 0557 89 8 (197 97)            Telemetry Review:  Sinus rhythm with rates in the 80's        Physical Exam  Vitals reviewed  Constitutional:       General: He is not in acute distress  HENT:      Head: Normocephalic and atraumatic  Mouth/Throat:      Mouth: Mucous membranes are moist    Cardiovascular:      Rate and Rhythm: Normal rate and regular rhythm  Heart sounds: Normal heart sounds, S1 normal and S2 normal  No murmur heard  Pulmonary:      Effort: Pulmonary effort is normal  No respiratory distress  Breath sounds: Normal breath sounds     Abdominal:      General: Bowel sounds are normal  There is no distension  Palpations: Abdomen is soft  Musculoskeletal:         General: Normal range of motion  Cervical back: Normal range of motion and neck supple  Right lower leg: No edema  Left lower leg: No edema  Skin:     General: Skin is warm and dry  Neurological:      Mental Status: He is alert and oriented to person, place, and time  Psychiatric:         Mood and Affect: Mood normal          LABORATORY RESULTS      CBC with diff:   Results from last 7 days   Lab Units 07/08/22  0626 07/07/22  1105 07/07/22  0432 07/06/22  2013 07/06/22  1213 07/06/22  0551 07/05/22  0253 07/04/22  1001 07/04/22  0107   WBC Thousand/uL 2 09*  --  2 14*  --   --  2 03* 1 80*  --  1 51*   HEMOGLOBIN g/dL 7 6* 7 8* 7 1* 7 5* 7 8* 7 1* 7 9*   < > 7 2*   HEMATOCRIT % 23 7* 24 6* 22 1* 23 7* 23 8* 21 7* 23 7*   < > 21 8*   MCV fL 88  --  86  --   --  86 86  --  86   PLATELETS Thousands/uL 144*  --  92*  --   --  59* 50*  --  45*   MCH pg 28 0  --  27 5  --   --  28 2 28 5  --  28 2   MCHC g/dL 32 1  --  32 1  --   --  32 7 33 3  --  33 0   RDW % 19 7*  --  19 3*  --   --  18 7* 18 7*  --  19 7*   MPV fL 12 4  --  11 0  --   --  11 8  --   --  10 2   NRBC AUTO /100 WBCs  --   --  0  --   --   --  0  --   --     < > = values in this interval not displayed         CMP:  Results from last 7 days   Lab Units 07/08/22  0626 07/07/22  0432 07/06/22  0551 07/05/22  1814 07/05/22  0253 07/04/22  0107   POTASSIUM mmol/L 4 0 4 1 3 9 3 8 3 8 4 2   CHLORIDE mmol/L 101 103 104 101 104 103   CO2 mmol/L 27 28 26 25 28 22   BUN mg/dL 47* 44* 40* 38* 33* 29*   CREATININE mg/dL 1 53* 1 35* 1 24 1 44* 1 26 1 20   CALCIUM mg/dL 8 9 8 8 8 8 8 8 8 9 9 0   AST U/L 23  --  43  --  79* 54*   ALT U/L 25  --  33  --  40 39   ALK PHOS U/L 110  --  100  --  110 119*   EGFR ml/min/1 73sq m 45 52 58 48 57 60       BMP:  Results from last 7 days   Lab Units 07/08/22  0626 07/07/22  0432 07/06/22  0551 07/05/22  1814 07/05/22  0253 07/04/22  0107 POTASSIUM mmol/L 4 0 4 1 3 9 3 8 3 8 4 2   CHLORIDE mmol/L 101 103 104 101 104 103   CO2 mmol/L 27 28 26 25 28 22   BUN mg/dL 47* 44* 40* 38* 33* 29*   CREATININE mg/dL 1 53* 1 35* 1 24 1 44* 1 26 1 20   CALCIUM mg/dL 8 9 8 8 8 8 8 8 8 9 9 0       Lab Results   Component Value Date    NTBNP 1,062 (H) 07/04/2022    NTBNP 389 (H) 05/22/2022             Results from last 7 days   Lab Units 07/08/22  0626 07/05/22  1814 07/05/22  0253 07/04/22  0107   MAGNESIUM mg/dL 2 1 1 8 2 3 1 4*                     Results from last 7 days   Lab Units 07/04/22  0107   INR  1 09       Lipid Profile:   No results found for: CHOL  Lab Results   Component Value Date    HDL 29 (L) 05/24/2022     Lab Results   Component Value Date    LDLCALC 124 (H) 05/24/2022     Lab Results   Component Value Date    TRIG 122 05/24/2022       Cardiac testing:   No results found for this or any previous visit  No results found for this or any previous visit  No results found for this or any previous visit  No valid procedures specified  No results found for this or any previous visit          Meds/Allergies   current meds:   Current Facility-Administered Medications   Medication Dose Route Frequency    acetaminophen (TYLENOL) tablet 650 mg  650 mg Oral Q6H PRN    aluminum-magnesium hydroxide-simethicone (MYLANTA) oral suspension 30 mL  30 mL Oral Q4H PRN    amiodarone tablet 200 mg  200 mg Oral TID With Meals    aspirin (ECOTRIN LOW STRENGTH) EC tablet 81 mg  81 mg Oral Daily    atorvastatin (LIPITOR) tablet 40 mg  40 mg Oral Daily With Dinner    baclofen tablet 10 mg  10 mg Oral Q6H PRN    cholestyramine sugar free (QUESTRAN LIGHT) packet 4 g  4 g Oral BID    gabapentin (NEURONTIN) capsule 300 mg  300 mg Oral BID    gabapentin (NEURONTIN) capsule 600 mg  600 mg Oral HS    insulin glargine (LANTUS) subcutaneous injection 20 Units 0 2 mL  20 Units Subcutaneous HS    insulin lispro (HumaLOG) 100 units/mL subcutaneous injection 1-6 Units  1-6 Units Subcutaneous TID AC    insulin lispro (HumaLOG) 100 units/mL subcutaneous injection 1-6 Units  1-6 Units Subcutaneous HS    insulin lispro (HumaLOG) 100 units/mL subcutaneous injection 5 Units  5 Units Subcutaneous TID With Meals    loratadine (CLARITIN) tablet 10 mg  10 mg Oral Daily    metoprolol succinate (TOPROL-XL) 24 hr tablet 50 mg  50 mg Oral Daily    ondansetron (ZOFRAN) injection 4 mg  4 mg Intravenous Q6H PRN    pantoprazole (PROTONIX) EC tablet 40 mg  40 mg Oral Daily Before Breakfast    potassium chloride (K-DUR,KLOR-CON) CR tablet 40 mEq  40 mEq Oral Daily    ranolazine (RANEXA) 12 hr tablet 500 mg  500 mg Oral Q12H VARSHA    rOPINIRole (REQUIP) tablet 0 5 mg  0 5 mg Oral HS    sodium chloride (PF) 0 9 % injection 3 mL  3 mL Intravenous Q8H [address]    tamsulosin (FLOMAX) capsule 0 4 mg  0 4 mg Oral Daily With Dinner    torsemide (DEMADEX) tablet 20 mg  20 mg Oral Daily     Medications Prior to Admission   Medication    acetaminophen (TYLENOL) 325 mg tablet    aspirin (ECOTRIN LOW STRENGTH) 81 mg EC tablet    atorvastatin (LIPITOR) 40 mg tablet    baclofen 10 mg tablet    cholestyramine sugar free (QUESTRAN LIGHT) 4 g packet    clopidogrel (PLAVIX) 75 mg tablet    furosemide (LASIX) 40 mg tablet    gabapentin (NEURONTIN) 300 mg capsule    gabapentin (NEURONTIN) 600 MG tablet    hydroxyurea (HYDREA) 500 mg capsule    insulin glargine (LANTUS) 100 units/mL subcutaneous injection    insulin lispro (HumaLOG) 100 units/mL injection    lisinopril (ZESTRIL) 5 mg tablet    loratadine (CLARITIN) 10 mg tablet    metoprolol succinate (TOPROL-XL) 25 mg 24 hr tablet    pantoprazole (PROTONIX) 40 mg tablet    potassium chloride (K-DUR,KLOR-CON) 20 mEq tablet    rOPINIRole (REQUIP) 0 5 mg tablet    tamsulosin (FLOMAX) 0 4 mg            Counseling / Coordination of Care  Total floor / unit time spent today 20 minutes    Greater than 50% of total time was spent with the patient and / or family counseling and / or coordination of care  ** Please Note: Dragon [employer] Dictation voice to text software may have been used in the creation of this document   **

## 2022-07-08 NOTE — DISCHARGE INSTRUCTIONS
Take one 200 mg tablet THREE times a day until 7/13/2022    On 7/14/2022 begin taking one 200 mg tablet TWICE a day until 7/20/2022    On 7/21/2022 begin taking one 200 mg tablet ONCE daily and continue taking it once daily thereafter

## 2022-07-08 NOTE — ASSESSMENT & PLAN NOTE
· Presented with CP that had occurred earlier in the day, now resolved   · Troponin 2212->2791  · EKG: Sinus tachycardia 113   · ER discussed with cardiology who did not recommend transfer for cardiac cath   · Unable to be placed on heparin drip due to pancytopenia  Patient prefers medical management and does not wish to undergo invasive procedures    · Repeat Echo (7/5): EF 87%, grade 2 diastolic dysfunction  · Continue with Telemetry  · Cardiology following

## 2022-07-08 NOTE — QUICK NOTE
Brief Hematology note  Patient is medically cleared for discharge today  Hydrea should remain on hold until he is seen by Dr Marylu Santillan in the office in the next couple of weeks  My office working on a follow up appointment  Patient needs to have CBC done weekly upon discharge and results [facility]  [facility]  Hematology Oncology

## 2022-07-08 NOTE — ASSESSMENT & PLAN NOTE
Lab Results   Component Value Date    HGBA1C 5 3 05/22/2022       Recent Labs     07/07/22  1607 07/07/22  2049 07/08/22  0732 07/08/22  1131   POCGLU 172* 159* 144* 232*       Blood Sugar Average: Last 72 hrs:  · (P) 300 [id number]   · Continue Home regimen:   · Lantus 20 units hs, humalog 5 units tid   · Continue Accu-checks and SSI AC/HS  · Hypoglycemic protocol  · Diabetic diet

## 2022-07-08 NOTE — ASSESSMENT & PLAN NOTE
· Multiple runs of atrial tachycardia noted on telemetry earlier in admission  · Started on amiodarone 200 mg TID on 7/6  · Toprol increased to Toprol 50 mg daily  · Optimize electrolytes  · Continue telemetry  · Improved

## 2022-07-08 NOTE — PHYSICAL THERAPY NOTE
PHYSICAL THERAPY NOTE       07/08/22 0805   PT Last Visit   PT Visit Date 07/08/22   Note Type   Note Type Treatment   Pain Assessment   Pain Assessment Tool 0-10   Pain Score No Pain   Restrictions/Precautions   Weight Bearing Precautions Per Order No   General   Chart Reviewed Yes   Additional Pertinent History Pt with intention tremors/writhing movements when attempting mobility  Family/Caregiver Present No   Cognition   Overall Cognitive Status WFL   Arousal/Participation Alert; Cooperative   Attention Within functional limits   Orientation Level Oriented X4   Memory Within functional limits   Following Commands Follows one step commands without difficulty   Bed Mobility   Supine to Sit 4  Minimal assistance   Additional items Assist x 1;HOB elevated; Bedrails; Increased time required;LE management   Additional Comments Pt remains seated in chair at end of session   Transfers   Sit to Stand 4  Minimal assistance   Additional items Assist x 1; Increased time required  (pt able to stand by pulling on quick move cross bar)   Stand to Sit 4  Minimal assistance   Additional items Assist x 1; Increased time required;Armrests   Stand pivot 1  Dependent  (Used quick move to pivot pt to chair at bedside)   Balance   Static Sitting Fair   Dynamic Sitting Fair   Activity Tolerance   Activity Tolerance   (no adverse effects to PT noted)   Assessment   Prognosis Fair   Problem List Decreased range of motion;Decreased strength;Decreased endurance; Impaired balance;Decreased mobility; Decreased coordination   Assessment Treatment consisted of bed mobility,balance training, transfer training, safety awareness, fall prevention, endurance training to increase upright positions and functional mobility  Pt able to perform sit to/from stand transfer with min A this session at  [facility];   Pt able to dictate care needs; Continue to assess for WC mobility as appropriate; Pt would benefit from continued PT while in hospital and follow up with [facility] to increase strength, balance, endurance, mobility independence to return to OF, decrease caregiver burden and improve quality of life  The patient's AM-PAC Basic Mobility Inpatient Short Form Raw Score is 12  A Raw score of less than or equal to 17 suggests the patient may benefit from discharge to post-acute rehabilitation services  Please also refer to the recommendation of the Physical Therapist for safe discharge planning  Goals   Patient Goals to go home   STG Expiration Date 07/19/22   Plan   Treatment/Interventions Functional transfer training;Elevations;LE strengthening/ROM; Endurance training; Therapeutic exercise;Patient/family training;Equipment eval/education; Bed mobility;Gait training; Compensatory technique education;Spoke to nursing   PT Frequency 2-3x/wk   Recommendation   PT Discharge Recommendation Return to facility with rehabilitation services   AM-PAC Basic Mobility Inpatient   Turning in Bed Without Bedrails 3   Lying on Back to Sitting on Edge of Flat Bed 3   Moving Bed to Chair 1   Standing Up From Chair 3   Walk in Room 1   Climb 3-5 Stairs 1   Basic Mobility Inpatient Raw Score 12   Basic Mobility Standardized Score 32 23   Turning Head Towards Sound 4   Follow Simple Instructions 4   Low Function Basic Mobility Raw Score 20   Low Function Basic Mobility Standardized Score 32 8   Highest Level Of Mobility   JH-HLM Goal 4: Move to chair/commode   JH-HLM Achieved 4: Move to chair/commode     Merced Massey, PT      Patient Name: Albertus Bernheim  RQZVA'H Date: 7/8/2022

## 2022-07-08 NOTE — PLAN OF CARE
Problem: MOBILITY - ADULT  Goal: Maintain or return to baseline ADL function  Description: INTERVENTIONS:  -  Assess patient's ability to carry out ADLs; assess patient's baseline for ADL function and identify physical deficits which impact ability to perform ADLs (bathing, care of mouth/teeth, toileting, grooming, dressing, etc )  - Assess/evaluate cause of self-care deficits   - Assess range of motion  - Assess patient's mobility; develop plan if impaired  - Assess patient's need for assistive devices and provide as appropriate  - Encourage maximum independence but intervene and supervise when necessary  - Involve family in performance of ADLs  - Assess for home care needs following discharge   - Consider OT consult to assist with ADL evaluation and planning for discharge  - Provide patient education as appropriate  Outcome: Progressing  Goal: Maintains/Returns to pre admission functional level  Description: INTERVENTIONS:  - Perform BMAT or MOVE assessment daily    - Set and communicate daily mobility goal to care team and patient/family/caregiver  - Collaborate with rehabilitation services on mobility goals if consulted  - Perform Range of Motion 5 times a day  - Reposition patient every 2 hours    - Dangle patient 3 times a day  - Stand patient 3 times a day  - Ambulate patient 3 times a day  - Out of bed to chair 3 times a day   - Out of bed for meals 3 times a day  - Out of bed for toileting  - Record patient progress and toleration of activity level   Outcome: Progressing     Problem: Potential for Falls  Goal: Patient will remain free of falls  Description: INTERVENTIONS:  - Educate patient/family on patient safety including physical limitations  - Instruct patient to call for assistance with activity   - Consult OT/PT to assist with strengthening/mobility   - Keep Call bell within reach  - Keep bed low and locked with side rails adjusted as appropriate  - Keep care items and personal belongings within reach  - Initiate and maintain comfort rounds  - Make Fall Risk Sign visible to staff  - Offer Toileting every 2 Hours, in advance of need  - Initiate/Maintain bed and chair alarm  - Obtain necessary fall risk management equipment: sign, socks, bracelet  - Apply yellow socks and bracelet for high fall risk patients  - Consider moving patient to room near nurses station  Outcome: Progressing     Problem: PAIN - ADULT  Goal: Verbalizes/displays adequate comfort level or baseline comfort level  Description: Interventions:  - Encourage patient to monitor pain and request assistance  - Assess pain using appropriate pain scale  - Administer analgesics based on type and severity of pain and evaluate response  - Implement non-pharmacological measures as appropriate and evaluate response  - Consider cultural and social influences on pain and pain management  - Notify physician/advanced practitioner if interventions unsuccessful or patient reports new pain  Outcome: Progressing     Problem: INFECTION - ADULT  Goal: Absence or prevention of progression during hospitalization  Description: INTERVENTIONS:  - Assess and monitor for signs and symptoms of infection  - Monitor lab/diagnostic results  - Monitor all insertion sites, i e  indwelling lines, tubes, and drains  - Monitor endotracheal if appropriate and nasal secretions for changes in amount and color  - [place] appropriate cooling/warming therapies per order  - Administer medications as ordered  - Instruct and encourage patient and family to use good hand hygiene technique  - Identify and instruct in appropriate isolation precautions for identified infection/condition  Outcome: Progressing  Goal: Absence of fever/infection during neutropenic period  Description: INTERVENTIONS:  - Monitor WBC    Outcome: Progressing     Problem: SAFETY ADULT  Goal: Maintain or return to baseline ADL function  Description: INTERVENTIONS:  -  Assess patient's ability to carry out ADLs; assess patient's baseline for ADL function and identify physical deficits which impact ability to perform ADLs (bathing, care of mouth/teeth, toileting, grooming, dressing, etc )  - Assess/evaluate cause of self-care deficits   - Assess range of motion  - Assess patient's mobility; develop plan if impaired  - Assess patient's need for assistive devices and provide as appropriate  - Encourage maximum independence but intervene and supervise when necessary  - Involve family in performance of ADLs  - Assess for home care needs following discharge   - Consider OT consult to assist with ADL evaluation and planning for discharge  - Provide patient education as appropriate  Outcome: Progressing  Goal: Maintains/Returns to pre admission functional level  Description: INTERVENTIONS:  - Perform BMAT or MOVE assessment daily    - Set and communicate daily mobility goal to care team and patient/family/caregiver  - Collaborate with rehabilitation services on mobility goals if consulted  - Perform Range of Motion 5 times a day  - Reposition patient every 2 hours    - Dangle patient 3 times a day  - Stand patient 3 times a day  - Ambulate patient 3 times a day  - Out of bed to chair 3 times a day   - Out of bed for meals 3 times a day  - Out of bed for toileting  - Record patient progress and toleration of activity level   Outcome: Progressing  Goal: Patient will remain free of falls  Description: INTERVENTIONS:  - Educate patient/family on patient safety including physical limitations  - Instruct patient to call for assistance with activity   - Consult OT/PT to assist with strengthening/mobility   - Keep Call bell within reach  - Keep bed low and locked with side rails adjusted as appropriate  - Keep care items and personal belongings within reach  - Initiate and maintain comfort rounds  - Make Fall Risk Sign visible to staff  - Offer Toileting every 2 Hours, in advance of need  - Initiate/Maintain bed/chair alarm  - Obtain necessary fall risk management equipment: sign, bracelet, socks  - Apply yellow socks and bracelet for high fall risk patients  - Consider moving patient to room near nurses station  Outcome: Progressing     Problem: DISCHARGE PLANNING  Goal: Discharge to home or other facility with appropriate resources  Description: INTERVENTIONS:  - Identify barriers to discharge w/patient and caregiver  - Arrange for needed discharge resources and transportation as appropriate  - Identify discharge learning needs (meds, wound care, etc )  - Arrange for interpretive services to assist at discharge as needed  - Refer to Case Management Department for coordinating discharge planning if the patient needs post-hospital services based on physician/advanced practitioner order or complex needs related to functional status, cognitive ability, or social support system  Outcome: Progressing     Problem: Knowledge Deficit  Goal: Patient/family/caregiver demonstrates understanding of disease process, treatment plan, medications, and discharge instructions  Description: Complete learning assessment and assess knowledge base    Interventions:  - Provide teaching at level of understanding  - Provide teaching via preferred learning methods  Outcome: Progressing     Problem: CARDIOVASCULAR - ADULT  Goal: Maintains optimal cardiac output and hemodynamic stability  Description: INTERVENTIONS:  - Monitor I/O, vital signs and rhythm  - Monitor for S/S and trends of decreased cardiac output  - Administer and titrate ordered vasoactive medications to optimize hemodynamic stability  - Assess quality of pulses, skin color and temperature  - Assess for signs of decreased coronary artery perfusion  - Instruct patient to report change in severity of symptoms  Outcome: Progressing  Goal: Absence of cardiac dysrhythmias or at baseline rhythm  Description: INTERVENTIONS:  - Continuous cardiac monitoring, vital signs, obtain 12 lead EKG if ordered  - Administer antiarrhythmic and heart rate control medications as ordered  - Monitor electrolytes and administer replacement therapy as ordered  Outcome: Progressing     Problem: METABOLIC, FLUID AND ELECTROLYTES - ADULT  Goal: Electrolytes maintained within normal limits  Description: INTERVENTIONS:  - Monitor labs and assess patient for signs and symptoms of electrolyte imbalances  - Administer electrolyte replacement as ordered  - Monitor response to electrolyte replacements, including repeat lab results as appropriate  - Instruct patient on fluid and nutrition as appropriate  Outcome: Progressing  Goal: Fluid balance maintained  Description: INTERVENTIONS:  - Monitor labs   - Monitor I/O and WT  - Instruct patient on fluid and nutrition as appropriate  - Assess for signs & symptoms of volume excess or deficit  Outcome: Progressing  Goal: Glucose maintained within target range  Description: INTERVENTIONS:  - Monitor Blood Glucose as ordered  - Assess for signs and symptoms of hyperglycemia and hypoglycemia  - Administer ordered medications to maintain glucose within target range  - Assess nutritional intake and initiate nutrition service referral as needed  Outcome: Progressing     Problem: SKIN/TISSUE INTEGRITY - ADULT  Goal: Skin Integrity remains intact(Skin Breakdown Prevention)     Problem: HEMATOLOGIC - ADULT  Goal: Maintains hematologic stability  Description: INTERVENTIONS  - Assess for signs and symptoms of bleeding or hemorrhage  - Monitor labs  - Administer supportive blood products/factors as ordered and appropriate  Outcome: Progressing     Problem: Prexisting or High Potential for Compromised Skin Integrity  Goal: Skin integrity is maintained or improved  Description: INTERVENTIONS:  - Identify patients at risk for skin breakdown  - Assess and monitor skin integrity  - Assess and monitor nutrition and hydration status  - Monitor labs   - Assess for incontinence   - Turn and reposition patient  - Assist with mobility/ambulation  - Relieve pressure over bony prominences  - Avoid friction and shearing  - Provide appropriate hygiene as needed including keeping skin clean and dry  - Evaluate need for skin moisturizer/barrier cream  - Collaborate with interdisciplinary team   - Patient/family teaching  - Consider wound care consult   Outcome: Progressing

## 2022-07-08 NOTE — PLAN OF CARE
Problem: PHYSICAL THERAPY ADULT  Goal: Performs mobility at highest level of function for planned discharge setting  See evaluation for individualized goals  Description: Treatment/Interventions: Functional transfer training, LE strengthening/ROM, Therapeutic exercise, Endurance training, Patient/family training, Equipment eval/education, Bed mobility, Compensatory technique education, Continued evaluation, Spoke to nursing, OT          See flowsheet documentation for full assessment, interventions and recommendations  Note: Prognosis: Fair  Problem List: Decreased range of motion, Decreased strength, Decreased endurance, Impaired balance, Decreased mobility, Decreased coordination  Assessment: Treatment consisted of bed mobility,balance training, transfer training, safety awareness, fall prevention, endurance training to increase upright positions and functional mobility  Pt able to perform sit to/from stand transfer with min A this session at [facility]; Pt able to dictate care needs; Continue to assess for WC mobility as appropriate; Pt would benefit from continued PT while in hospital and follow up with rehab at facility [facility] to increase strength, balance, endurance, mobility independence to return to OF, decrease caregiver burden and improve quality of life  The patient's AM-PAC Basic Mobility Inpatient Short Form Raw Score is 12  A Raw score of less than or equal to 17 suggests the patient may benefit from discharge to post-acute rehabilitation services  Please also refer to the recommendation of the Physical Therapist for safe discharge planning  PT Discharge Recommendation: Return to facility with rehabilitation services          See flowsheet documentation for full assessment

## 2022-07-08 NOTE — PLAN OF CARE
Problem: MOBILITY - ADULT  Goal: Maintain or return to baseline ADL function  Description: INTERVENTIONS:  -  Assess patient's ability to carry out ADLs; assess patient's baseline for ADL function and identify physical deficits which impact ability to perform ADLs (bathing, care of mouth/teeth, toileting, grooming, dressing, etc )  - Assess/evaluate cause of self-care deficits   - Assess range of motion  - Assess patient's mobility; develop plan if impaired  - Assess patient's need for assistive devices and provide as appropriate  - Encourage maximum independence but intervene and supervise when necessary  - Involve family in performance of ADLs  - Assess for home care needs following discharge   - Consider OT consult to assist with ADL evaluation and planning for discharge  - Provide patient education as appropriate  Outcome: Progressing  Goal: Maintains/Returns to pre admission functional level  Description: INTERVENTIONS:  - Perform BMAT or MOVE assessment daily    - Set and communicate daily mobility goal to care team and patient/family/caregiver  - Collaborate with rehabilitation services on mobility goals if consulted  - Perform Range of Motion 2 times a day  - Reposition patient every 2 hours    - Dangle patient 2 times a day  - Stand patient 2 times a day  - Ambulate patient 2 times a day  - Out of bed to chair 2 times a day   - Out of bed for meals 2 times a day  - Out of bed for toileting  - Record patient progress and toleration of activity level   Outcome: Progressing     Problem: Potential for Falls  Goal: Patient will remain free of falls  Description: INTERVENTIONS:  - Educate patient/family on patient safety including physical limitations  - Instruct patient to call for assistance with activity   - Consult OT/PT to assist with strengthening/mobility   - Keep Call bell within reach  - Keep bed low and locked with side rails adjusted as appropriate  - Keep care items and personal belongings within reach  - Initiate and maintain comfort rounds  - Make Fall Risk Sign visible to staff  - Offer Toileting every 2 Hours, in advance of need  - Initiate/Maintain bed alarm  - Obtain necessary fall risk management equipment: fall risk signs   - Apply yellow socks and bracelet for high fall risk patients  - Consider moving patient to room near nurses station  Outcome: Progressing     Problem: PAIN - ADULT  Goal: Verbalizes/displays adequate comfort level or baseline comfort level  Description: Interventions:  - Encourage patient to monitor pain and request assistance  - Assess pain using appropriate pain scale  - Administer analgesics based on type and severity of pain and evaluate response  - Implement non-pharmacological measures as appropriate and evaluate response  - Consider cultural and social influences on pain and pain management  - Notify physician/advanced practitioner if interventions unsuccessful or patient reports new pain  Outcome: Progressing     Problem: INFECTION - ADULT  Goal: Absence or prevention of progression during hospitalization  Description: INTERVENTIONS:  - Assess and monitor for signs and symptoms of infection  - Monitor lab/diagnostic results  - Monitor all insertion sites, i e  indwelling lines, tubes, and drains  - Monitor endotracheal if appropriate and nasal secretions for changes in amount and color  - [place] appropriate cooling/warming therapies per order  - Administer medications as ordered  - Instruct and encourage patient and family to use good hand hygiene technique  - Identify and instruct in appropriate isolation precautions for identified infection/condition  Outcome: Progressing  Goal: Absence of fever/infection during neutropenic period  Description: INTERVENTIONS:  - Monitor WBC    Outcome: Progressing     Problem: SAFETY ADULT  Goal: Maintain or return to baseline ADL function  Description: INTERVENTIONS:  -  Assess patient's ability to carry out ADLs; assess patient's baseline for ADL function and identify physical deficits which impact ability to perform ADLs (bathing, care of mouth/teeth, toileting, grooming, dressing, etc )  - Assess/evaluate cause of self-care deficits   - Assess range of motion  - Assess patient's mobility; develop plan if impaired  - Assess patient's need for assistive devices and provide as appropriate  - Encourage maximum independence but intervene and supervise when necessary  - Involve family in performance of ADLs  - Assess for home care needs following discharge   - Consider OT consult to assist with ADL evaluation and planning for discharge  - Provide patient education as appropriate  Outcome: Progressing  Goal: Maintains/Returns to pre admission functional level  Description: INTERVENTIONS:  - Perform BMAT or MOVE assessment daily    - Set and communicate daily mobility goal to care team and patient/family/caregiver  - Collaborate with rehabilitation services on mobility goals if consulted  - Perform Range of Motion 2 times a day  - Reposition patient every 2 hours    - Dangle patient 2 times a day  - Stand patient 2 times a day  - Ambulate patient 2 times a day  - Out of bed to chair 2 times a day   - Out of bed for meals 2 times a day  - Out of bed for toileting  - Record patient progress and toleration of activity level   Outcome: Progressing  Goal: Patient will remain free of falls  Description: INTERVENTIONS:  - Educate patient/family on patient safety including physical limitations  - Instruct patient to call for assistance with activity   - Consult OT/PT to assist with strengthening/mobility   - Keep Call bell within reach  - Keep bed low and locked with side rails adjusted as appropriate  - Keep care items and personal belongings within reach  - Initiate and maintain comfort rounds  - Make Fall Risk Sign visible to staff  - Offer Toileting every 2 Hours, in advance of need  - Initiate/Maintain bed alarm  - Obtain necessary fall risk management equipment: bed alarm   - Apply yellow socks and bracelet for high fall risk patients  - Consider moving patient to room near nurses station  Outcome: Progressing     Problem: DISCHARGE PLANNING  Goal: Discharge to home or other facility with appropriate resources  Description: INTERVENTIONS:  - Identify barriers to discharge w/patient and caregiver  - Arrange for needed discharge resources and transportation as appropriate  - Identify discharge learning needs (meds, wound care, etc )  - Arrange for interpretive services to assist at discharge as needed  - Refer to Case Management Department for coordinating discharge planning if the patient needs post-hospital services based on physician/advanced practitioner order or complex needs related to functional status, cognitive ability, or social support system  Outcome: Progressing     Problem: Knowledge Deficit  Goal: Patient/family/caregiver demonstrates understanding of disease process, treatment plan, medications, and discharge instructions  Description: Complete learning assessment and assess knowledge base    Interventions:  - Provide teaching at level of understanding  - Provide teaching via preferred learning methods  Outcome: Progressing     Problem: CARDIOVASCULAR - ADULT  Goal: Maintains optimal cardiac output and hemodynamic stability  Description: INTERVENTIONS:  - Monitor I/O, vital signs and rhythm  - Monitor for S/S and trends of decreased cardiac output  - Administer and titrate ordered vasoactive medications to optimize hemodynamic stability  - Assess quality of pulses, skin color and temperature  - Assess for signs of decreased coronary artery perfusion  - Instruct patient to report change in severity of symptoms  Outcome: Progressing  Goal: Absence of cardiac dysrhythmias or at baseline rhythm  Description: INTERVENTIONS:  - Continuous cardiac monitoring, vital signs, obtain 12 lead EKG if ordered  - Administer antiarrhythmic and heart rate control medications as ordered  - Monitor electrolytes and administer replacement therapy as ordered  Outcome: Progressing     Problem: METABOLIC, FLUID AND ELECTROLYTES - ADULT  Goal: Electrolytes maintained within normal limits  Description: INTERVENTIONS:  - Monitor labs and assess patient for signs and symptoms of electrolyte imbalances  - Administer electrolyte replacement as ordered  - Monitor response to electrolyte replacements, including repeat lab results as appropriate  - Instruct patient on fluid and nutrition as appropriate  Outcome: Progressing  Goal: Fluid balance maintained  Description: INTERVENTIONS:  - Monitor labs   - Monitor I/O and WT  - Instruct patient on fluid and nutrition as appropriate  - Assess for signs & symptoms of volume excess or deficit  Outcome: Progressing  Goal: Glucose maintained within target range  Description: INTERVENTIONS:  - Monitor Blood Glucose as ordered  - Assess for signs and symptoms of hyperglycemia and hypoglycemia  - Administer ordered medications to maintain glucose within target range  - Assess nutritional intake and initiate nutrition service referral as needed  Outcome: Progressing     Problem: HEMATOLOGIC - ADULT  Goal: Maintains hematologic stability  Description: INTERVENTIONS  - Assess for signs and symptoms of bleeding or hemorrhage  - Monitor labs  - Administer supportive blood products/factors as ordered and appropriate  Outcome: Progressing     Problem: Prexisting or High Potential for Compromised Skin Integrity  Goal: Skin integrity is maintained or improved  Description: INTERVENTIONS:  - Identify patients at risk for skin breakdown  - Assess and monitor skin integrity  - Assess and monitor nutrition and hydration status  - Monitor labs   - Assess for incontinence   - Turn and reposition patient  - Assist with mobility/ambulation  - Relieve pressure over bony prominences  - Avoid friction and shearing  - Provide appropriate hygiene as needed including keeping skin clean and dry  - Evaluate need for skin moisturizer/barrier cream  - Collaborate with interdisciplinary team   - Patient/family teaching  - Consider wound care consult   Outcome: Progressing

## 2022-07-08 NOTE — ASSESSMENT & PLAN NOTE
Wt Readings from Last 3 Encounters:   07/08/22 90 3 kg (199 lb)   05/26/22 89 3 kg (196 lb 13 9 oz)   02/25/22 94 4 kg (208 lb 3 2 oz)     · Mildly volume overloaded   · BNP 1062  · Home regimen: 40 mg lasix PO daily   · Prior ECHO 05/22: EF 16%, grade 2 diastolic dysfunction   · Repeat Echo (7/5): EF 81%, grade 2 diastolic dysfunction  · Now on torsemide 20 mg daily  · Monitor I/O and daily weight   · Continue low NA diet and Fluid restriction   · Telemetry reviewed  · Cardiology consulted, appreciate recommendations:  · Found to have drop in EF with regional wall motion abnormalities  Likely representing ischemic cardiomyopathy  · Conservative measures were recommended, and patient did not want to pursue cardiac cath  Continue with medical therapy   Discontinue Lisinopril to up titrate anti-anginal's and beta-blockers

## 2022-07-09 LAB
BACTERIA BLD CULT: NORMAL
BACTERIA BLD CULT: NORMAL

## 2022-07-11 ENCOUNTER — PATIENT OUTREACH (OUTPATIENT)
Dept: CASE MANAGEMENT | Facility: OTHER | Age: 72
End: 2022-07-11

## 2022-07-11 NOTE — PROGRESS NOTES
Chart review reveals that patient was discharged from [address] [place] [address] on 7/8/22  Patient received 1 U PRBC  Hematology was consulted  Initial work-up did not reveal source of thrombocytopenia  Continued cardiac work-up revealed ischemic cardiomyopathy  EF 40%  Plan is for conservative treatment given advanced multiple sclerosis  Patient was discharged back to [facility] where patient is a LTC patient

## 2022-07-12 ENCOUNTER — TELEPHONE (OUTPATIENT)
Dept: HEMATOLOGY ONCOLOGY | Facility: HOSPITAL | Age: 72
End: 2022-07-12

## 2022-07-12 NOTE — TELEPHONE ENCOUNTER
Called [employer] and ALEXX with Delores but spoke to the nurse and she confirmed the appt, date, and time for patient and location but will give Delores the message and I left my message with my phone number in case Delores needs to speak with me

## 2022-08-01 ENCOUNTER — OFFICE VISIT (OUTPATIENT)
Dept: HEMATOLOGY ONCOLOGY | Facility: HOSPITAL | Age: 72
End: 2022-08-01
Payer: MEDICARE

## 2022-08-01 VITALS
TEMPERATURE: 96.5 F | BODY MASS INDEX: 28.55 KG/M2 | DIASTOLIC BLOOD PRESSURE: 52 MMHG | HEART RATE: 64 BPM | HEIGHT: 70 IN | OXYGEN SATURATION: 98 % | SYSTOLIC BLOOD PRESSURE: 108 MMHG | RESPIRATION RATE: 16 BRPM

## 2022-08-01 DIAGNOSIS — D47.3 ESSENTIAL (HEMORRHAGIC) THROMBOCYTHEMIA (HCC): Primary | ICD-10-CM

## 2022-08-01 PROCEDURE — 99214 OFFICE O/P EST MOD 30 MIN: CPT | Performed by: INTERNAL MEDICINE

## 2022-08-03 NOTE — PROGRESS NOTES
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 +      Hydrea 1000 mg BID    2022 - admit with pancytopenia - hydrea held for one week then lowered to 500 mg BID    Interval History: patient presents for follow up of his ET  He is on hydrea 500 mg BID  He was admitted last month with pancytopenia  Hydrea was held for 1 week they restarted at 500 mg BID  The initial dose was 1000 MG BID that caused the pancytopenia  He denies any bleeding or fevers  No current sign of infection  Cbc prior to this visit shows [address] = 4 5, hgb = 10 2 and plt = 621          REVIEW OF SYSTEMS:  Please note that a 14-point review of systems was performed to include Constitutional, HEENT, Respiratory, CVS, GI, , Musculoskeletal, Integumentary, Neurologic, Rheumatologic, Endocrinologic, Psychiatric, Lymphatic, and Hematologic/Oncologic systems were reviewed and are negative unless otherwise stated in HPI  Positive and negative findings pertinent to this evaluation are incorporated into the history of present illness        ECOG PS: 4    PROBLEM LIST:  Patient Active Problem List:    Diarrhea    Multiple sclerosis (HCC)    Antiplatelet or antithrombotic long-term use    HTN (hypertension)    Acute pulmonary edema (HCC)    Acute respiratory failure (HCC)    Coronary artery disease involving native coronary artery    Elevated troponin    Type 2 diabetes mellitus, with long-term current use of insulin (HCC)    Essential (hemorrhagic) thrombocythemia (HCC)    SIRS (systemic inflammatory response syndrome) ([facility] )    Pure hypercholesterolemia    Ischemic cardiomyopathy    Abnormal echocardiogram    Acute on chronic combined systolic and diastolic congestive heart failure ([facility] )    PVD (peripheral vascular disease) ([facility] ) Stage 3 chronic kidney disease, unspecified whether stage 3a or 3b CKD (HCC)    Pancytopenia (HCC)    Hypomagnesemia    Lactic acidosis    Atrial tachycardia (HCC)      Assessment / Plan: we will continue with hydrea 500 mg BID  Ideally we would like the plts to be less that 400, but it will cause other cytopenias and he will not tolerate significant anemia well  He is on plavix as well  I will see him back in 6 mos and will check a cbc every 2 weeks  I gave Melody wilder my fax number to send the results  I also called his wife and gave her an update  I spent 30 minutes on chart review, face to face counseling time, coordination of care and documentation  Past Medical History:  has a past medical history of Atrial fibrillation ([facility] ), BPH (benign prostatic hyperplasia), Congestive heart failure (CHF) ([facility] ), Diabetes mellitus ([facility] ), Hyperlipidemia, Hypertension, Multiple sclerosis ([facility] ), Neuropathy, and RLS (restless legs syndrome)  PAST SURGICAL HISTORY:  has no past surgical history on file  CURRENT MEDICATIONS  Current Outpatient Medications:  acetaminophen (TYLENOL) 325 mg tablet, Take 650 mg by mouth every 6 (six) hours as needed for mild pain, Disp: , Rfl:   amiodarone 200 mg tablet, Take 1 tablet (200 mg total) by mouth 3 (three) times a day with meals for 6 days, THEN 1 tablet (200 mg total) 2 (two) times a day for 7 days, THEN 1 tablet (200 mg total) daily for 21 days  , Disp: 53 tablet, Rfl: 0  aspirin (ECOTRIN LOW STRENGTH) 81 mg EC tablet, Take 81 mg by mouth in the morning , Disp: , Rfl:   atorvastatin (LIPITOR) 40 mg tablet, Take 1 tablet (40 mg total) by mouth daily with dinner, Disp: , Rfl: 0  baclofen 10 mg tablet, Take 10 mg by mouth every 6 (six) hours as needed for muscle spasms, Disp: , Rfl:   cholestyramine sugar free (QUESTRAN LIGHT) 4 g packet, Take 4 g by mouth 2 (two) times a day, Disp: , Rfl:   clopidogrel (PLAVIX) 75 mg tablet, Take 75 mg by mouth daily, Disp: , Rfl: gabapentin (NEURONTIN) 300 mg capsule, Take 300 mg by mouth in the morning and 300 mg in the evening , Disp: , Rfl:   gabapentin (NEURONTIN) 600 MG tablet, Take 600 mg by mouth daily at bedtime, Disp: , Rfl:   insulin glargine (LANTUS) 100 units/mL subcutaneous injection, Inject 20 Units under the skin daily at bedtime, Disp: 10 mL, Rfl: 0  insulin lispro (HumaLOG) 100 units/mL injection, Inject 5 Units under the skin 3 (three) times a day with meals, Disp: , Rfl: 0  loratadine (CLARITIN) 10 mg tablet, Take 10 mg by mouth daily, Disp: , Rfl:   metoprolol succinate (TOPROL-XL) 50 mg 24 hr tablet, Take 1 tablet (50 mg total) by mouth daily, Disp: 30 tablet, Rfl: 0  pantoprazole (PROTONIX) 40 mg tablet, Take 40 mg by mouth daily, Disp: , Rfl:   potassium chloride (K-DUR,KLOR-CON) 20 mEq tablet, Take 2 tablets (40 mEq total) by mouth daily, Disp: , Rfl: 0  ranolazine (RANEXA) 500 mg 12 hr tablet, Take 1 tablet (500 mg total) by mouth every 12 (twelve) hours, Disp: 60 tablet, Rfl: 0  rOPINIRole (REQUIP) 0 5 mg tablet, Take 0 5 mg by mouth daily at bedtime, Disp: , Rfl:   tamsulosin (FLOMAX) 0 4 mg, Take 0 4 mg by mouth daily with dinner, Disp: , Rfl:   torsemide (DEMADEX) 20 mg tablet, Take 1 tablet (20 mg total) by mouth daily, Disp: 60 tablet, Rfl: 0    No current facility-administered medications for this visit  [unfilled]    SOCIAL HISTORY:  reports that he has never smoked  He has never used smokeless tobacco  He reports previous alcohol use  He reports that he does not use drugs  FAMILY HISTORY:  family history is not on file  ALLERGIES:  is allergic to metformin  Physical Exam:  Vital Signs:   /52 (BP Location: Left arm, Patient Position: Sitting, Cuff Size: Adult)   Pulse 64   Temp (!) 96 5 °F (35 8 °C) (Tympanic)   Resp 16   Ht 5' 10" (1 778 m)   SpO2 98%   BMI 28 55 kg/m²   Smoking Status Never Smoker   BSA 2 08 m²   Body mass index is 28 55 kg/m²    Body surface area is 2 08 meters squared  GEN: Alert, awake oriented x3, in no acute distress  HEENT- No pallor, icterus, cyanosis, no oral mucosal lesions,   LAD - no palpable cervical, clavicle, axillary, inguinal LAD  Heart- normal S1 S2, regular rate and rhythm, No murmur, rubs     Lungs- clear breathing sound bilateral    Abdomen- soft, Non tender, bowel sounds present  Extremities- trace LE edema    Labs:  Lab Results      Component                Value               Date                      WBC                      2 09 (L)            07/08/2022                HGB                      7 6 (L)             07/08/2022                HCT                      23 7 (L)            07/08/2022                MCV                      88                  07/08/2022                PLT                      144 (L)             07/08/2022            Lab Results      Component                Value               Date                      SODIUM                   137                 07/08/2022                K                        4 0                 07/08/2022                CL                       101                 07/08/2022                CO2                      27                  07/08/2022                AGAP                     9                   07/08/2022                BUN                      47 (H)              07/08/2022                CREATININE               1 53 (H)            07/08/2022                GLUC                     125                 07/08/2022                CALCIUM                  8 9                 07/08/2022                AST                      23                  07/08/2022                ALT                      25                  07/08/2022                ALKPHOS                  110                 07/08/2022                TP                       7 4                 07/08/2022                TBILI                    1 00                07/08/2022                EGFR                     45 07/08/2022

## 2022-08-15 ENCOUNTER — OFFICE VISIT (OUTPATIENT)
Dept: CARDIOLOGY CLINIC | Facility: CLINIC | Age: 72
End: 2022-08-15
Payer: MEDICARE

## 2022-08-15 VITALS
HEART RATE: 65 BPM | WEIGHT: 192.8 LBS | DIASTOLIC BLOOD PRESSURE: 56 MMHG | SYSTOLIC BLOOD PRESSURE: 118 MMHG | HEIGHT: 70 IN | BODY MASS INDEX: 27.6 KG/M2

## 2022-08-15 DIAGNOSIS — I50.30 HEART FAILURE WITH PRESERVED EJECTION FRACTION, UNSPECIFIED HF CHRONICITY (HCC): Primary | ICD-10-CM

## 2022-08-15 DIAGNOSIS — I25.10 CORONARY ARTERY DISEASE INVOLVING NATIVE CORONARY ARTERY OF NATIVE HEART WITHOUT ANGINA PECTORIS: ICD-10-CM

## 2022-08-15 DIAGNOSIS — I47.1 ATRIAL TACHYCARDIA (HCC): ICD-10-CM

## 2022-08-15 PROCEDURE — 99214 OFFICE O/P EST MOD 30 MIN: CPT | Performed by: INTERNAL MEDICINE

## 2022-08-15 RX ORDER — BISMUTH SUBSALICYLATE 525 MG/15ML
SUSPENSION ORAL
COMMUNITY
Start: 2022-08-14

## 2022-08-15 RX ORDER — ONDANSETRON 4 MG/1
4 TABLET, ORALLY DISINTEGRATING ORAL EVERY 8 HOURS PRN
COMMUNITY

## 2022-08-15 RX ORDER — HYDROXYUREA 500 MG/1
CAPSULE ORAL DAILY
COMMUNITY

## 2022-08-15 NOTE — PROGRESS NOTES
Cardiology Follow Up    [facility]  1950  [id number]  [address] [facility] [address]  29 [facility] [phone]    1  Heart failure with preserved ejection fraction, unspecified HF chronicity ([facility] )     2  Atrial tachycardia ([facility] )     3  Coronary artery disease involving native coronary artery of native heart without angina pectoris         Interval History: Followup CHF    No major issues  Weight stable  No dyspnea, chest pain or palpitations  Medical Problems             Problem List     Diarrhea    Multiple sclerosis (HCC)    Antiplatelet or antithrombotic long-term use    HTN (hypertension)    Overview Signed 5/22/2022 11:50 PM by Tana Garrido PA-C     Last Assessment & Plan:   Formatting of this note might be different from the original   Controlled           Acute pulmonary edema (HCC)    Acute respiratory failure (HCC)    Coronary artery disease involving native coronary artery    Elevated troponin    Type 2 diabetes mellitus, with long-term current use of insulin (HCC)      Lab Results   Component Value Date    HGBA1C 5 3 05/22/2022         Essential (hemorrhagic) thrombocythemia (HCC)    SIRS (systemic inflammatory response syndrome) ([facility] )    Pure hypercholesterolemia    Ischemic cardiomyopathy    Abnormal echocardiogram    Acute on chronic combined systolic and diastolic congestive heart failure (HCC)    Wt Readings from Last 3 Encounters:   08/15/22 87 5 kg (192 lb 12 8 oz)   07/08/22 90 3 kg (199 lb)   05/26/22 89 3 kg (196 lb 13 9 oz)                 PVD (peripheral vascular disease) (HCC)    Stage 3 chronic kidney disease, unspecified whether stage 3a or 3b CKD (HCC)    Lab Results   Component Value Date    EGFR 45 07/08/2022    EGFR 52 07/07/2022    EGFR 58 07/06/2022    CREATININE 1 53 (H) 07/08/2022    CREATININE 1 35 (H) 07/07/2022    CREATININE 1 24 07/06/2022         Pancytopenia ([facility] )    Hypomagnesemia    Lactic acidosis    Atrial tachycardia (HCC)              Past Medical History:   Diagnosis Date    Atrial fibrillation (HCC)     BPH (benign prostatic hyperplasia)     Congestive heart failure (CHF) (HCC)     Diabetes mellitus (HCC)     Hyperlipidemia     Hypertension     Multiple sclerosis (HCC)     Neuropathy     RLS (restless legs syndrome)      Social History     Socioeconomic History    Marital status: /Civil Union     Spouse name: Not on file    Number of children: Not on file    Years of education: Not on file    Highest education level: Not on file   Occupational History    Not on file   Tobacco Use    Smoking status: Never Smoker    Smokeless tobacco: Never Used   Vaping Use    Vaping Use: Never used   Substance and Sexual Activity    Alcohol use: Not Currently    Drug use: Never    Sexual activity: Not on file   Other Topics Concern    Not on file   Social History Narrative    Not on file     Social Determinants of Health     Financial Resource Strain: Not on file   Food Insecurity: No Food Insecurity    Worried About Running Out of Food in the Last Year: Never true    Juanpablo of Food in the Last Year: Never true   Transportation Needs: No Transportation Needs    Lack of Transportation (Medical): No    Lack of Transportation (Non-Medical): No   Physical Activity: Not on file   Stress: Not on file   Social Connections: Not on file   Intimate Partner Violence: Not on file   Housing Stability: Low Risk     Unable to Pay for Housing in the Last Year: No    Number of Places Lived in the Last Year: 1    Unstable Housing in the Last Year: No      Family History   Problem Relation Age of Onset    Colon cancer Neg Hx     Colon polyps Neg Hx      No past surgical history on file      Current Outpatient Medications:     acetaminophen (TYLENOL) 325 mg tablet, Take 650 mg by mouth every 6 (six) hours as needed for mild pain, Disp: , Rfl:    Anti-Diarrheal 2 MG tablet, , Disp: , Rfl:     aspirin (ECOTRIN LOW STRENGTH) 81 mg EC tablet, Take 81 mg by mouth in the morning , Disp: , Rfl:     atorvastatin (LIPITOR) 40 mg tablet, Take 1 tablet (40 mg total) by mouth daily with dinner, Disp: , Rfl: 0    baclofen 10 mg tablet, Take 10 mg by mouth every 6 (six) hours as needed for muscle spasms, Disp: , Rfl:     cholestyramine sugar free (QUESTRAN LIGHT) 4 g packet, Take 4 g by mouth 2 (two) times a day, Disp: , Rfl:     clopidogrel (PLAVIX) 75 mg tablet, Take 75 mg by mouth daily, Disp: , Rfl:     gabapentin (NEURONTIN) 300 mg capsule, Take 300 mg by mouth in the morning and 300 mg in the evening , Disp: , Rfl:     gabapentin (NEURONTIN) 600 MG tablet, Take 600 mg by mouth daily at bedtime, Disp: , Rfl:     hydroxyurea (HYDREA) 500 mg capsule, Take by mouth daily, Disp: , Rfl:     insulin glargine (LANTUS) 100 units/mL subcutaneous injection, Inject 20 Units under the skin daily at bedtime, Disp: 10 mL, Rfl: 0    insulin lispro (HumaLOG) 100 units/mL injection, Inject 5 Units under the skin 3 (three) times a day with meals, Disp: , Rfl: 0    loratadine (CLARITIN) 10 mg tablet, Take 10 mg by mouth daily, Disp: , Rfl:     metoprolol succinate (TOPROL-XL) 50 mg 24 hr tablet, Take 1 tablet (50 mg total) by mouth daily, Disp: 30 tablet, Rfl: 0    ondansetron (ZOFRAN-ODT) 4 mg disintegrating tablet, Take 4 mg by mouth every 8 (eight) hours as needed for nausea or vomiting, Disp: , Rfl:     pantoprazole (PROTONIX) 40 mg tablet, Take 40 mg by mouth daily, Disp: , Rfl:     potassium chloride (K-DUR,KLOR-CON) 20 mEq tablet, Take 2 tablets (40 mEq total) by mouth daily, Disp: , Rfl: 0    ranolazine (RANEXA) 500 mg 12 hr tablet, Take 1 tablet (500 mg total) by mouth every 12 (twelve) hours, Disp: 60 tablet, Rfl: 0    rOPINIRole (REQUIP) 0 5 mg tablet, Take 0 5 mg by mouth daily at bedtime, Disp: , Rfl:     tamsulosin (FLOMAX) 0 4 mg, Take 0 4 mg by mouth daily with dinner, Disp: , Rfl:     torsemide (DEMADEX) 20 mg tablet, Take 1 tablet (20 mg total) by mouth daily (Patient taking differently: Take 20 mg by mouth 3 (three) times a week M-W-F), Disp: 60 tablet, Rfl: 0  Allergies   Allergen Reactions    Metformin GI Intolerance       Labs:     Chemistry        Component Value Date/Time    K 4 0 07/08/2022 0626     07/08/2022 0626    CO2 27 07/08/2022 0626    CO2 24 05/22/2022 2150    BUN 47 (H) 07/08/2022 0626    CREATININE 1 53 (H) 07/08/2022 0626        Component Value Date/Time    CALCIUM 8 9 07/08/2022 0626    ALKPHOS 110 07/08/2022 0626    AST 23 07/08/2022 0626    ALT 25 07/08/2022 0626            No results found for: CHOL  Lab Results   Component Value Date    HDL 29 (L) 05/24/2022     Lab Results   Component Value Date    LDLCALC 124 (H) 05/24/2022     Lab Results   Component Value Date    TRIG 122 05/24/2022     No results found for: CHOLHDL    Imaging: No results found  Review of Systems   Constitutional: Negative  HENT: Negative  Eyes: Negative  Cardiovascular: Negative  Respiratory: Negative  Endocrine: Negative  Hematologic/Lymphatic: Negative  Skin: Negative  Musculoskeletal: Negative  Gastrointestinal: Negative  Genitourinary: Negative  Neurological: Negative  Psychiatric/Behavioral: Negative  Allergic/Immunologic: Negative  Vitals:    08/15/22 1127   BP: 118/56   Pulse: 65           Physical Exam  Vitals reviewed  Constitutional:       Appearance: Normal appearance  HENT:      Head: Normocephalic  Nose: Nose normal       Mouth/Throat:      Mouth: Mucous membranes are moist       Pharynx: Oropharynx is clear  Eyes:      General: No scleral icterus  Conjunctiva/sclera: Conjunctivae normal    Cardiovascular:      Rate and Rhythm: Normal rate and regular rhythm  Heart sounds: No murmur heard  No friction rub  No gallop     Pulmonary:      Effort: Pulmonary effort is normal  No respiratory distress  Breath sounds: Normal breath sounds  No wheezing or rales  Abdominal:      General: Abdomen is flat  Bowel sounds are normal  There is no distension  Palpations: Abdomen is soft  Tenderness: There is no abdominal tenderness  There is no guarding  Musculoskeletal:      Cervical back: Normal range of motion and neck supple  Right lower leg: No edema  Left lower leg: No edema  Skin:     General: Skin is warm and dry  Neurological:      General: No focal deficit present  Mental Status: He is alert and oriented to person, place, and time  Psychiatric:         Mood and Affect: Mood normal          Behavior: Behavior normal          Discussion/Summary:    Chronic diastolic CHF EF 23%  Continue with torsemide  Volume status is stable  PSVT: Continue with metoprolol at current dosing  CAD: Continue with current medical therapy  Lipids and lab work with pcp at [facility]  The patient was counseled regarding diagnostic results, instructions for management, risk factor reductions, impressions  total time of encounter was 25 minutes and 15 minutes was spent counseling

## 2022-08-24 ENCOUNTER — EPISODE CHANGES (OUTPATIENT)
Dept: CASE MANAGEMENT | Facility: OTHER | Age: 72
End: 2022-08-24

## 2022-09-20 ENCOUNTER — TELEPHONE (OUTPATIENT)
Dept: HEMATOLOGY ONCOLOGY | Facility: CLINIC | Age: 72
End: 2022-09-20

## 2022-09-20 NOTE — TELEPHONE ENCOUNTER
Spoke with Shelia at [facility] to make sure that they rec'd my fax with physicians orders to change the patient's hydrea to 500mg every other day and to check the cbc every 2 weeks  Shelia confirmed that they did receive the fax

## 2022-10-07 ENCOUNTER — TELEPHONE (OUTPATIENT)
Dept: HEMATOLOGY ONCOLOGY | Facility: CLINIC | Age: 72
End: 2022-10-07

## 2022-10-07 ENCOUNTER — HOSPITAL ENCOUNTER (EMERGENCY)
Facility: HOSPITAL | Age: 72
Discharge: HOME | End: 2022-10-08
Attending: EMERGENCY MEDICINE
Payer: MEDICARE

## 2022-10-07 DIAGNOSIS — D64.9 CHRONIC ANEMIA: Primary | ICD-10-CM

## 2022-10-07 LAB
2HR DELTA HS TROPONIN: -1 NG/L
ABO GROUP BLD: NORMAL
ALBUMIN SERPL BCP-MCNC: 3.4 G/DL (ref 3.5–5)
ALP SERPL-CCNC: 119 U/L (ref 46–116)
ALT SERPL W P-5'-P-CCNC: 12 U/L (ref 12–78)
ANION GAP SERPL CALCULATED.3IONS-SCNC: 5 MMOL/L (ref 4–13)
AST SERPL W P-5'-P-CCNC: 20 U/L (ref 5–45)
BASOPHILS # BLD AUTO: 0.03 THOUSANDS/ΜL (ref 0–0.1)
BASOPHILS NFR BLD AUTO: 1 % (ref 0–1)
BILIRUB SERPL-MCNC: 0.4 MG/DL (ref 0.2–1)
BLD GP AB SCN SERPL QL: NEGATIVE
BUN SERPL-MCNC: 25 MG/DL (ref 5–25)
CALCIUM ALBUM COR SERPL-MCNC: 9.5 MG/DL (ref 8.3–10.1)
CALCIUM SERPL-MCNC: 9 MG/DL (ref 8.3–10.1)
CARDIAC TROPONIN I PNL SERPL HS: 34 NG/L
CARDIAC TROPONIN I PNL SERPL HS: 35 NG/L
CHLORIDE SERPL-SCNC: 106 MMOL/L (ref 96–108)
CO2 SERPL-SCNC: 30 MMOL/L (ref 21–32)
CREAT SERPL-MCNC: 1.48 MG/DL (ref 0.6–1.3)
EOSINOPHIL # BLD AUTO: 0.34 THOUSAND/ΜL (ref 0–0.61)
EOSINOPHIL NFR BLD AUTO: 6 % (ref 0–6)
ERYTHROCYTE [DISTWIDTH] IN BLOOD BY AUTOMATED COUNT: 20.2 % (ref 11.6–15.1)
GFR SERPL CREATININE-BSD FRML MDRD: 46 ML/MIN/1.73SQ M
GLUCOSE SERPL-MCNC: 134 MG/DL (ref 65–140)
GLUCOSE SERPL-MCNC: 158 MG/DL (ref 65–140)
GLUCOSE SERPL-MCNC: 53 MG/DL (ref 65–140)
HCT VFR BLD AUTO: 24.4 % (ref 36.5–49.3)
HGB BLD-MCNC: 7.6 G/DL (ref 12–17)
IMM GRANULOCYTES # BLD AUTO: 0.04 THOUSAND/UL (ref 0–0.2)
IMM GRANULOCYTES NFR BLD AUTO: 1 % (ref 0–2)
LIPASE SERPL-CCNC: 85 U/L (ref 73–393)
LYMPHOCYTES # BLD AUTO: 0.9 THOUSANDS/ΜL (ref 0.6–4.47)
LYMPHOCYTES NFR BLD AUTO: 17 % (ref 14–44)
MCH RBC QN AUTO: 37.8 PG (ref 26.8–34.3)
MCHC RBC AUTO-ENTMCNC: 31.1 G/DL (ref 31.4–37.4)
MCV RBC AUTO: 121 FL (ref 82–98)
MONOCYTES # BLD AUTO: 0.71 THOUSAND/ΜL (ref 0.17–1.22)
MONOCYTES NFR BLD AUTO: 13 % (ref 4–12)
NEUTROPHILS # BLD AUTO: 3.33 THOUSANDS/ΜL (ref 1.85–7.62)
NEUTS SEG NFR BLD AUTO: 62 % (ref 43–75)
NRBC BLD AUTO-RTO: 0 /100 WBCS
PLATELET # BLD AUTO: 286 THOUSANDS/UL (ref 149–390)
PMV BLD AUTO: 10.5 FL (ref 8.9–12.7)
POTASSIUM SERPL-SCNC: 4.1 MMOL/L (ref 3.5–5.3)
PROT SERPL-MCNC: 7.8 G/DL (ref 6.4–8.4)
RBC # BLD AUTO: 2.01 MILLION/UL (ref 3.88–5.62)
RH BLD: POSITIVE
SODIUM SERPL-SCNC: 141 MMOL/L (ref 135–147)
SPECIMEN EXPIRATION DATE: NORMAL
WBC # BLD AUTO: 5.35 THOUSAND/UL (ref 4.31–10.16)

## 2022-10-07 PROCEDURE — 80053 COMPREHEN METABOLIC PANEL: CPT | Performed by: EMERGENCY MEDICINE

## 2022-10-07 PROCEDURE — 86901 BLOOD TYPING SEROLOGIC RH(D): CPT | Performed by: EMERGENCY MEDICINE

## 2022-10-07 PROCEDURE — 83690 ASSAY OF LIPASE: CPT | Performed by: EMERGENCY MEDICINE

## 2022-10-07 PROCEDURE — 36415 COLL VENOUS BLD VENIPUNCTURE: CPT | Performed by: EMERGENCY MEDICINE

## 2022-10-07 PROCEDURE — 82948 REAGENT STRIP/BLOOD GLUCOSE: CPT

## 2022-10-07 PROCEDURE — 85025 COMPLETE CBC W/AUTO DIFF WBC: CPT | Performed by: EMERGENCY MEDICINE

## 2022-10-07 PROCEDURE — 99284 EMERGENCY DEPT VISIT MOD MDM: CPT

## 2022-10-07 PROCEDURE — 86900 BLOOD TYPING SEROLOGIC ABO: CPT | Performed by: EMERGENCY MEDICINE

## 2022-10-07 PROCEDURE — 99284 EMERGENCY DEPT VISIT MOD MDM: CPT | Performed by: EMERGENCY MEDICINE

## 2022-10-07 PROCEDURE — 84484 ASSAY OF TROPONIN QUANT: CPT | Performed by: EMERGENCY MEDICINE

## 2022-10-07 PROCEDURE — 86850 RBC ANTIBODY SCREEN: CPT | Performed by: EMERGENCY MEDICINE

## 2022-10-07 NOTE — TELEPHONE ENCOUNTER
Spoke with Katiuska from [facility] to let her know that the patient's appointment on 2/6/23 is being rescheduled to 2/13/23 because the physician is out of office  Katiuska wrote down patient's new appt date/time

## 2022-10-08 VITALS
TEMPERATURE: 98.3 F | WEIGHT: 182 LBS | DIASTOLIC BLOOD PRESSURE: 58 MMHG | BODY MASS INDEX: 26.05 KG/M2 | HEIGHT: 70 IN | RESPIRATION RATE: 18 BRPM | HEART RATE: 67 BPM | SYSTOLIC BLOOD PRESSURE: 115 MMHG | OXYGEN SATURATION: 96 %

## 2022-10-08 LAB — GLUCOSE SERPL-MCNC: 305 MG/DL (ref 65–140)

## 2022-10-08 PROCEDURE — 82948 REAGENT STRIP/BLOOD GLUCOSE: CPT

## 2022-10-08 NOTE — DISCHARGE INSTRUCTIONS
Return to the ER for further concerns or worsening symptoms  Follow up with your primary care physician, hem/onc and GI in 1-2 days

## 2022-10-08 NOTE — ED PROVIDER NOTES
History  Chief Complaint   Patient presents with   • Anemia     Pt arrives EMS from [facility] for HGB 8 3, pt upset because he wanted to watch the [employer] game and staff called 911  Patient is a 77-year-old male from [facility] other presents emergency department abnormal labs  Patient with outpatient labs which showed hemoglobin of 8 3  Patient denies somatic complaints  He has a prior medical history of Afib (on ASA and plavix), CHF, diabetes, hypertension, hyperlipidemia, MS, neuropathy, RLS  History provided by:  Patient   used: No    Evaluation of Abnormal Diagnostic Test  Patient referred by:  Nursing home  Resulting agency:  External  Result type: hematology    Hematology:     Hemoglobin:  Low    Other abnormal hematology result:  8 2-->7 4      Prior to Admission Medications   Prescriptions Last Dose Informant Patient Reported? Taking? Anti-Diarrheal 2 MG tablet   Yes No   acetaminophen (TYLENOL) 325 mg tablet  Outside Facility (Specify) Yes No   Sig: Take 650 mg by mouth every 6 (six) hours as needed for mild pain   aspirin (ECOTRIN LOW STRENGTH) 81 mg EC tablet  Outside Facility (Specify) Yes No   Sig: Take 81 mg by mouth in the morning     atorvastatin (LIPITOR) 40 mg tablet  Outside Facility (Specify) No No   Sig: Take 1 tablet (40 mg total) by mouth daily with dinner   baclofen 10 mg tablet  Outside Facility (Specify) Yes No   Sig: Take 10 mg by mouth every 6 (six) hours as needed for muscle spasms   cholestyramine sugar free (QUESTRAN LIGHT) 4 g packet  Outside Facility (Specify) Yes No   Sig: Take 4 g by mouth 2 (two) times a day   clopidogrel (PLAVIX) 75 mg tablet  Outside Facility (Specify) Yes No   Sig: Take 75 mg by mouth daily   gabapentin (NEURONTIN) 300 mg capsule  Outside Facility (Specify) Yes No   Sig: Take 300 mg by mouth in the morning and 300 mg in the evening    gabapentin (NEURONTIN) 600 MG tablet  Outside Facility (Specify) Yes No   Sig: Take 600 mg by mouth daily at bedtime   hydroxyurea (HYDREA) 500 mg capsule   Yes No   Sig: Take by mouth daily   insulin glargine (LANTUS) 100 units/mL subcutaneous injection  Outside Facility (Specify) No No   Sig: Inject 20 Units under the skin daily at bedtime   insulin lispro (HumaLOG) 100 units/mL injection  Outside Facility (Specify) No No   Sig: Inject 5 Units under the skin 3 (three) times a day with meals   loratadine (CLARITIN) 10 mg tablet  Outside Facility (Specify) Yes No   Sig: Take 10 mg by mouth daily   metoprolol succinate (TOPROL-XL) 50 mg 24 hr tablet   No No   Sig: Take 1 tablet (50 mg total) by mouth daily   ondansetron (ZOFRAN-ODT) 4 mg disintegrating tablet   Yes No   Sig: Take 4 mg by mouth every 8 (eight) hours as needed for nausea or vomiting   pantoprazole (PROTONIX) 40 mg tablet  Outside Facility (Specify) Yes No   Sig: Take 40 mg by mouth daily   potassium chloride (K-DUR,KLOR-CON) 20 mEq tablet  Outside Facility (Specify) No No   Sig: Take 2 tablets (40 mEq total) by mouth daily   rOPINIRole (REQUIP) 0 5 mg tablet  Outside Facility (Specify) Yes No   Sig: Take 0 5 mg by mouth daily at bedtime   ranolazine (RANEXA) 500 mg 12 hr tablet   No No   Sig: Take 1 tablet (500 mg total) by mouth every 12 (twelve) hours   tamsulosin (FLOMAX) 0 4 mg  Outside Facility (Specify) Yes No   Sig: Take 0 4 mg by mouth daily with dinner   torsemide (DEMADEX) 20 mg tablet   No No   Sig: Take 1 tablet (20 mg total) by mouth daily   Patient taking differently: Take 20 mg by mouth 3 (three) times a week M-W-F      Facility-Administered Medications: None       Past Medical History:   Diagnosis Date   • Atrial fibrillation (HCC)    • BPH (benign prostatic hyperplasia)    • Congestive heart failure (CHF) (HCC)    • Diabetes mellitus (HCC)    • Hyperlipidemia    • Hypertension    • Multiple sclerosis (HCC)    • Neuropathy    • RLS (restless legs syndrome)        History reviewed   No pertinent surgical history  Family History   Problem Relation Age of Onset   • Colon cancer Neg Hx    • Colon polyps Neg Hx      I have reviewed and agree with the history as documented  E-Cigarette/Vaping   • E-Cigarette Use Never User      E-Cigarette/Vaping Substances   • Nicotine No    • THC No    • CBD No    • Flavoring No    • Other No    • Unknown No      Social History     Tobacco Use   • Smoking status: Never Smoker   • Smokeless tobacco: Never Used   Vaping Use   • Vaping Use: Never used   Substance Use Topics   • Alcohol use: Not Currently   • Drug use: Never       Review of Systems   Constitutional: Negative for chills  Cardiovascular: Negative for palpitations  Gastrointestinal: Negative for constipation  Genitourinary: Negative for dysuria, flank pain, hematuria and urgency  Musculoskeletal: Negative for back pain  Skin: Negative for color change  All other systems reviewed and are negative  Physical Exam  Physical Exam  Vitals and nursing note reviewed  Constitutional:       Appearance: He is well-developed  HENT:      Head: Normocephalic and atraumatic  Eyes:      Pupils: Pupils are equal, round, and reactive to light  Cardiovascular:      Rate and Rhythm: Normal rate and regular rhythm  Heart sounds: Normal heart sounds  Pulmonary:      Effort: Pulmonary effort is normal       Breath sounds: Normal breath sounds  Abdominal:      General: Bowel sounds are normal  There is no distension  Palpations: Abdomen is soft  There is no mass  Tenderness: There is no abdominal tenderness  There is no guarding or rebound  Genitourinary:     Rectum: Guaiac result negative  Musculoskeletal:      Cervical back: Normal range of motion and neck supple  Skin:     General: Skin is warm and dry  Neurological:      Mental Status: He is alert and oriented to person, place, and time  Psychiatric:         Behavior: Behavior normal          Thought Content:  Thought content normal  Judgment: Judgment normal          Vital Signs  ED Triage Vitals [10/07/22 1546]   Temperature Pulse Respirations Blood Pressure SpO2   98 3 °F (36 8 °C) 64 16 96/52 98 %      Temp Source Heart Rate Source Patient Position - Orthostatic VS BP Location FiO2 (%)   Temporal Monitor Sitting Left arm --      Pain Score       No Pain           Vitals:    10/07/22 1546 10/07/22 1928 10/07/22 2004   BP: 96/52 112/57 132/61   Pulse: 64 63 65   Patient Position - Orthostatic VS: Sitting Sitting          Visual Acuity      ED Medications  Medications - No data to display    Diagnostic Studies  Results Reviewed     Procedure Component Value Units Date/Time    HS Troponin I 2hr [[id number]] Collected: 10/07/22 1954    Lab Status:  In process Specimen: Blood Updated: 10/07/22 1954    HS Troponin 0hr (reflex protocol) [[id number]]  (Normal) Collected: 10/07/22 1550    Lab Status: Final result Specimen: Blood from Arm, Right Updated: 10/07/22 1617     hs TnI 0hr 35 ng/L     HS Troponin I 4hr [[id number]]     Lab Status: No result Specimen: Blood     Comprehensive metabolic panel [[id number]]  (Abnormal) Collected: 10/07/22 1550    Lab Status: Final result Specimen: Blood from Arm, Right Updated: 10/07/22 1610     Sodium 141 mmol/L      Potassium 4 1 mmol/L      Chloride 106 mmol/L      CO2 30 mmol/L      ANION GAP 5 mmol/L      BUN 25 mg/dL      Creatinine 1 48 mg/dL      Glucose 158 mg/dL      Calcium 9 0 mg/dL      Corrected Calcium 9 5 mg/dL      AST 20 U/L      ALT 12 U/L      Alkaline Phosphatase 119 U/L      Total Protein 7 8 g/dL      Albumin 3 4 g/dL      Total Bilirubin 0 40 mg/dL      eGFR 46 ml/min/1 73sq m     Narrative:      Scarlett guidelines for Chronic Kidney Disease (CKD):   •  Stage 1 with normal or high GFR (GFR > 90 mL/min/1 73 square meters)  •  Stage 2 Mild CKD (GFR = 60-89 mL/min/1 73 square meters)  •  Stage 3A Moderate CKD (GFR = 45-59 mL/min/1 73 square meters)  •  Stage 3B Moderate CKD (GFR = 30-44 mL/min/1 73 square meters)  •  Stage 4 Severe CKD (GFR = 15-29 mL/min/1 73 square meters)  •  Stage 5 End Stage CKD (GFR <15 mL/min/1 73 square meters)  Note: GFR calculation is accurate only with a steady state creatinine    Lipase [[id number]]  (Normal) Collected: 10/07/22 1550    Lab Status: Final result Specimen: Blood from Arm, Right Updated: 10/07/22 1610     Lipase 85 u/L     CBC and differential [[id number]]  (Abnormal) Collected: 10/07/22 1550    Lab Status: Final result Specimen: Blood from Arm, Right Updated: 10/07/22 1555     WBC 5 35 Thousand/uL      RBC 2 01 Million/uL      Hemoglobin 7 6 g/dL      Hematocrit 24 4 %       fL      MCH 37 8 pg      MCHC 31 1 g/dL      RDW 20 2 %      MPV 10 5 fL      Platelets 202 Thousands/uL      nRBC 0 /100 WBCs      Neutrophils Relative 62 %      Immat GRANS % 1 %      Lymphocytes Relative 17 %      Monocytes Relative 13 %      Eosinophils Relative 6 %      Basophils Relative 1 %      Neutrophils Absolute 3 33 Thousands/µL      Immature Grans Absolute 0 04 Thousand/uL      Lymphocytes Absolute 0 90 Thousands/µL      Monocytes Absolute 0 71 Thousand/µL      Eosinophils Absolute 0 34 Thousand/µL      Basophils Absolute 0 03 Thousands/µL                  No orders to display              Procedures  Procedures         ED Course                               SBIRT 20yo+    Flowsheet Row Most Recent Value   SBIRT (25 yo +)    In order to provide better care to our patients, we are screening all of our patients for alcohol and drug use  Would it be okay to ask you these screening questions? Yes Filed at: 10/07/2022 1548   Initial Alcohol Screen: US AUDIT-C     1  How often do you have a drink containing alcohol? 0 Filed at: 10/07/2022 1548   2  How many drinks containing alcohol do you have on a typical day you are drinking? 0 Filed at: 10/07/2022 1548   3a  Male UNDER 65: How often do you have five or more drinks on one occasion?  0 Filed at: 10/07/2022 1548   3b  FEMALE Any Age, or MALE 65+: How often do you have 4 or more drinks on one occassion? 0 Filed at: 10/07/2022 1548   Audit-C Score 0 Filed at: 10/07/2022 1548   DANITZA: How many times in the past year have you    Used an illegal drug or used a prescription medication for non-medical reasons? Never Filed at: 10/07/2022 1548                    MDM  Number of Diagnoses or Management Options  Chronic anemia: new and requires workup  Diagnosis management comments: Previous labs reviewed  Patient's hemoglobin ranges from 7-8   I reviewed results with patient's nurse at [facility]  Patient be discharged        Amount and/or Complexity of Data Reviewed  Clinical lab tests: ordered and reviewed    Risk of Complications, Morbidity, and/or Mortality  Presenting problems: high  Diagnostic procedures: high  Management options: high    Patient Progress  Patient progress: stable      Disposition  Final diagnoses:   Chronic anemia     Time reflects when diagnosis was documented in both MDM as applicable and the Disposition within this note     Time User Action Codes Description Comment    10/7/2022  8:19 PM Vipul Ray Add [D64 9] Chronic anemia       ED Disposition     ED Disposition   Discharge    Condition   Stable    Date/Time   Fri Oct 7, 2022  8:19 PM    Comment   Ruel Mckinnon discharge to home/self care                 Follow-up Information     Follow up With Specialties Details Why Contact Info Additional Information    Orestes Sanchez MD Internal Medicine Schedule an appointment as soon as possible for a visit in 1 day for follow up [address] [facility] [id number] [facility] Specialists [facility] Gastroenterology Schedule an appointment as soon as possible for a visit in 3 days for follow up [address] [address] [facility] [phone] [address] [facility]ists [address]    Apurva Prieto Hematology Oncology Specialists [facility] Hematology and Oncology Schedule an appointment as soon as possible for a visit in 2 days for follow up [address]  [phone] Apurva Prieto [facility] [place] [address], [address], [address] [phone]          Patient's Medications   Discharge Prescriptions    No medications on file       No discharge procedures on file      PDMP Review     None          ED Provider  Electronically Signed by           Luis Miguel Barrios DO  10/08/22 0002

## 2022-10-08 NOTE — ED NOTES
Primary RN and ED tech changed and repositioned patient for patient comfort       Cam Moreland  10/08/22 7938

## 2022-12-12 ENCOUNTER — OFFICE VISIT (OUTPATIENT)
Dept: HEMATOLOGY ONCOLOGY | Facility: HOSPITAL | Age: 72
End: 2022-12-12

## 2022-12-12 VITALS
RESPIRATION RATE: 16 BRPM | DIASTOLIC BLOOD PRESSURE: 60 MMHG | HEART RATE: 52 BPM | BODY MASS INDEX: 26.11 KG/M2 | TEMPERATURE: 97.8 F | SYSTOLIC BLOOD PRESSURE: 98 MMHG | OXYGEN SATURATION: 96 % | HEIGHT: 70 IN

## 2022-12-12 DIAGNOSIS — D47.3 ESSENTIAL (HEMORRHAGIC) THROMBOCYTHEMIA (HCC): Primary | ICD-10-CM

## 2022-12-14 NOTE — PROGRESS NOTES
HEMATOLOGY / [address] FOLLOW UP NOTE    Primary Care Provider: Adela Layne MD  Referring Provider:    MRN: [id number]  : 1950    Reason for Encounter: follow up ET       Oncology History Overview Note   2022 - diagnosed with essential thrombocythemia - JAK2 [id number] +    Hydrea 1000 mg BID     2022 - admit with pancytopenia - hydrea held for one week then lowered to 500 mg BID    2022 - current dose hydrea 500 mg QOD - dose further lowered due to worsening anemia     Essential (hemorrhagic) thrombocythemia ([facility] )   2022 Initial Diagnosis    Essential (hemorrhagic) thrombocythemia ([facility] )         Interval History: patient presents for follow up of his essential thrombocythemia  Labs prior to this visit shwo a hgb = 10 5, plt = 402, and ANC = 4 2  His hydrea dose is 500 mg QOD  He denies any fevers or bleeding  He was last in the ER on 10/7/22 for anemia, but ultimately did not get a transfusion  He denies any HA  No blurry vision  No CP/SOB  REVIEW OF SYSTEMS:  Please note that a 14-point review of systems was performed to include Constitutional, HEENT, Respiratory, CVS, GI, , Musculoskeletal, Integumentary, Neurologic, Rheumatologic, Endocrinologic, Psychiatric, Lymphatic, and Hematologic/Oncologic systems were reviewed and are negative unless otherwise stated in HPI  Positive and negative findings pertinent to this evaluation are incorporated into the history of present illness         ECOG PS: 3    PROBLEM LIST:  Patient Active Problem List   Diagnosis   • Diarrhea   • Multiple sclerosis ([facility] [address] )   • Antiplatelet or antithrombotic long-term use   • HTN (hypertension)   • Acute pulmonary edema (HCC)   • Acute respiratory failure (HCC)   • Coronary artery disease involving native coronary artery   • Elevated troponin   • Type 2 diabetes mellitus, with long-term current use of insulin (HCC)   • Essential (hemorrhagic) thrombocythemia (HCC)   • SIRS (systemic inflammatory response syndrome) ([facility] )   • Pure hypercholesterolemia   • Ischemic cardiomyopathy   • Abnormal echocardiogram   • Acute on chronic combined systolic and diastolic congestive heart failure (HCC)   • PVD (peripheral vascular disease) (HCC)   • Stage 3 chronic kidney disease, unspecified whether stage 3a or 3b CKD (HCC)   • Pancytopenia (HCC)   • Hypomagnesemia   • Lactic acidosis   • Atrial tachycardia (HCC)       Assessment / Plan: this dose of hydrea, 500 mg QOD, is working well for him  It is controlling his platelets without causing significant other cytopenias  I will see him back in 6 mos and we can cut his blood draws back to 3 mos and 6 mos  he knows to call in the interim with any questions or concerns giovanni communicated these recommendations to [facility]  I spent 20 minutes on chart review, face to face counseling time, coordination of care and documentation  Past Medical History:   has a past medical history of Atrial fibrillation ([place] [address] ), BPH (benign prostatic hyperplasia), Congestive heart failure (CHF) ([place] [address] ), Diabetes mellitus ([place] [address] ), Hyperlipidemia, Hypertension, Multiple sclerosis ([place] [address] ), Neuropathy, and RLS (restless legs syndrome)  PAST SURGICAL HISTORY:   has no past surgical history on file  CURRENT MEDICATIONS  Current Outpatient Medications   Medication Sig Dispense Refill   • acetaminophen (TYLENOL) 325 mg tablet Take 650 mg by mouth every 6 (six) hours as needed for mild pain     • Anti-Diarrheal 2 MG tablet      • aspirin (ECOTRIN LOW STRENGTH) 81 mg EC tablet Take 81 mg by mouth in the morning       • atorvastatin (LIPITOR) 40 mg tablet Take 1 tablet (40 mg total) by mouth daily with dinner  0   • baclofen 10 mg tablet Take 10 mg by mouth every 6 (six) hours as needed for muscle spasms     • cholestyramine sugar free (QUESTRAN LIGHT) 4 g packet Take 4 g by mouth 2 (two) times a day     • clopidogrel (PLAVIX) 75 mg tablet Take 75 mg by mouth daily     • gabapentin (NEURONTIN) 300 mg capsule Take 300 mg by mouth in the morning and 300 mg in the evening  • gabapentin (NEURONTIN) 600 MG tablet Take 600 mg by mouth daily at bedtime     • hydroxyurea (HYDREA) 500 mg capsule Take by mouth daily     • insulin glargine (LANTUS) 100 units/mL subcutaneous injection Inject 20 Units under the skin daily at bedtime 10 mL 0   • insulin lispro (HumaLOG) 100 units/mL injection Inject 5 Units under the skin 3 (three) times a day with meals  0   • loratadine (CLARITIN) 10 mg tablet Take 10 mg by mouth daily     • metoprolol succinate (TOPROL-XL) 50 mg 24 hr tablet Take 1 tablet (50 mg total) by mouth daily 30 tablet 0   • ondansetron (ZOFRAN-ODT) 4 mg disintegrating tablet Take 4 mg by mouth every 8 (eight) hours as needed for nausea or vomiting     • pantoprazole (PROTONIX) 40 mg tablet Take 40 mg by mouth daily     • potassium chloride (K-DUR,KLOR-CON) 20 mEq tablet Take 2 tablets (40 mEq total) by mouth daily  0   • ranolazine (RANEXA) 500 mg 12 hr tablet Take 1 tablet (500 mg total) by mouth every 12 (twelve) hours 60 tablet 0   • rOPINIRole (REQUIP) 0 5 mg tablet Take 0 5 mg by mouth daily at bedtime     • tamsulosin (FLOMAX) 0 4 mg Take 0 4 mg by mouth daily with dinner     • torsemide (DEMADEX) 20 mg tablet Take 1 tablet (20 mg total) by mouth daily (Patient taking differently: Take 20 mg by mouth 3 (three) times a week M-W-F) 60 tablet 0     No current facility-administered medications for this visit  [unfilled]    SOCIAL HISTORY:   reports that he has never smoked  He has never used smokeless tobacco  He reports that he does not currently use alcohol  He reports that he does not use drugs  FAMILY HISTORY:  family history is not on file  ALLERGIES:  is allergic to metformin        Physical Exam:  Vital Signs:   Visit Vitals  BP 98/60 (BP Location: Left arm, Patient Position: Sitting, Cuff Size: Adult)   Pulse (!) 52   Temp 97 8 °F (36 6 °C) (Tympanic)   Resp 16   Ht 5' 10" (1 778 m)   SpO2 96%   BMI 26 11 kg/m²   Smoking Status Never   BSA 2 01 m²     Body mass index is 26 11 kg/m²  Body surface area is 2 01 meters squared  GEN: Alert, awake oriented x3, in no acute distress  HEENT- No pallor, icterus, cyanosis, no oral mucosal lesions,   LAD - no palpable cervical, clavicle, axillary, inguinal LAD  Heart- normal S1 S2, regular rate and rhythm, No murmur, rubs     Lungs- clear breathing sound bilateral    Abdomen- soft, Non tender, bowel sounds present  Extremities- No cyanosis, clubbing, edema  Neuro- No focal neurological deficit    Labs:  Lab Results   Component Value Date    WBC 5 35 10/07/2022    HGB 7 6 (L) 10/07/2022    HCT 24 4 (L) 10/07/2022     (H) 10/07/2022     10/07/2022     Lab Results   Component Value Date    SODIUM 141 10/07/2022    K 4 1 10/07/2022     10/07/2022    CO2 30 10/07/2022    AGAP 5 10/07/2022    BUN 25 10/07/2022    CREATININE 1 48 (H) 10/07/2022    GLUC 158 (H) 10/07/2022    CALCIUM 9 0 10/07/2022    AST 20 10/07/2022    ALT 12 10/07/2022    ALKPHOS 119 (H) 10/07/2022    TP 7 8 10/07/2022    TBILI 0 40 10/07/2022    EGFR 46 10/07/2022

## 2022-12-27 ENCOUNTER — OFFICE VISIT (OUTPATIENT)
Dept: GASTROENTEROLOGY | Facility: CLINIC | Age: 72
End: 2022-12-27

## 2022-12-27 VITALS
WEIGHT: 182 LBS | DIASTOLIC BLOOD PRESSURE: 58 MMHG | HEIGHT: 70 IN | SYSTOLIC BLOOD PRESSURE: 119 MMHG | BODY MASS INDEX: 26.05 KG/M2

## 2022-12-27 DIAGNOSIS — R19.7 DIARRHEA, UNSPECIFIED TYPE: Primary | ICD-10-CM

## 2022-12-27 DIAGNOSIS — K62.5 RECTAL BLEED: ICD-10-CM

## 2022-12-27 RX ORDER — HYDROCORTISONE 25 MG/G
CREAM TOPICAL 2 TIMES DAILY PRN
Qty: 28 G | Refills: 2 | Status: SHIPPED | OUTPATIENT
Start: 2022-12-27

## 2022-12-27 NOTE — PROGRESS NOTES
[facility] - Outpatient Follow-up Note  Chantal Muñoz 67 y o  male MRN: [id number]  Encounter: [id number]    ASSESSMENT AND PLAN:      1  Diarrhea, unspecified type  Work-up with stool studies and colonoscopy with biopsy  Suspect medicine and functional related  Seems to be doing well on cholestyramine  -Continue cholestyramine twice a day    2  Rectal bleed  Intermittent rectal bleeding which I suspect is secondary to hemorrhoids  Had colonoscopy earlier this year  Probably exacerbated by Plavix and he is in a wheelchair  - hydrocortisone (ANUSOL-HC) 2 5 % rectal cream; Apply topically 2 (two) times a day as needed for hemorrhoids  Dispense: 28 g; Refill: 2      Followup Appointment: 1 year  ______________________________________________________________________    Chief Complaint   Patient presents with   • Rectal Bleeding     HPI: The patient is seen in the office in follow-up secondary to diarrhea  Stool studies were negative  Colonoscopy with random biopsies were essentially negative  He was started on cholestyramine which he did not tolerate dissolving and water but has no issues tolerating it when he mixes it in applesauce  He is moving his bowels once a day  His major concern now is having some intermittent rectal bleeding  He reports some blood in the toilet bowl  He denies any excessive straining  He is not in a wheelchair secondary to his multiple sclerosis  Denies any anal burning or itching  His colonoscopy earlier this year showed small internal hemorrhoids  Denies any abdominal pain  His weight is stable  He denies any upper GI symptoms      Historical Information   Past Medical History:   Diagnosis Date   • Atrial fibrillation ([facility] )    • BPH (benign prostatic hyperplasia)    • Congestive heart failure (CHF) ([facility]    • Diabetes mellitus ([facility] )    • Hyperlipidemia    • Hypertension    • Multiple sclerosis (HCC)    • Neuropathy    • RLS (restless legs syndrome)      History reviewed  No pertinent surgical history  Social History     Substance and Sexual Activity   Alcohol Use Not Currently     Social History     Substance and Sexual Activity   Drug Use Never     Social History     Tobacco Use   Smoking Status Never   Smokeless Tobacco Never     Family History   Problem Relation Age of Onset   • Colon cancer Neg Hx    • Colon polyps Neg Hx          Current Outpatient Medications:   •  acetaminophen (TYLENOL) 325 mg tablet  •  Anti-Diarrheal 2 MG tablet  •  aspirin (ECOTRIN LOW STRENGTH) 81 mg EC tablet  •  atorvastatin (LIPITOR) 40 mg tablet  •  baclofen 10 mg tablet  •  cholestyramine sugar free (QUESTRAN LIGHT) 4 g packet  •  clopidogrel (PLAVIX) 75 mg tablet  •  gabapentin (NEURONTIN) 300 mg capsule  •  gabapentin (NEURONTIN) 600 MG tablet  •  hydrocortisone (ANUSOL-HC) 2 5 % rectal cream  •  hydroxyurea (HYDREA) 500 mg capsule  •  insulin glargine (LANTUS) 100 units/mL subcutaneous injection  •  insulin lispro (HumaLOG) 100 units/mL injection  •  loratadine (CLARITIN) 10 mg tablet  •  metoprolol succinate (TOPROL-XL) 50 mg 24 hr tablet  •  ondansetron (ZOFRAN-ODT) 4 mg disintegrating tablet  •  pantoprazole (PROTONIX) 40 mg tablet  •  potassium chloride (K-DUR,KLOR-CON) 20 mEq tablet  •  ranolazine (RANEXA) 500 mg 12 hr tablet  •  rOPINIRole (REQUIP) 0 5 mg tablet  •  tamsulosin (FLOMAX) 0 4 mg  •  torsemide (DEMADEX) 20 mg tablet  Allergies   Allergen Reactions   • Metformin GI Intolerance     Reviewed medications and allergies and updated as indicated    PHYSICAL EXAM:    Blood pressure 119/58, height 5' 10" (1 778 m), weight 82 6 kg (182 lb)  Body mass index is 26 11 kg/m²  General Appearance: NAD, cooperative, alert  Eyes: Anicteric, PERRLA, EOMI  ENT:  Normocephalic, atraumatic, normal mucosa      Neck:  Supple, symmetrical, trachea midline  Resp:  Clear to auscultation bilaterally; no rales, rhonchi or wheezing; respirations unlabored   CV:  S1 S2, Regular rate and rhythm; no murmur, rub, or gallop  GI:  Soft, non-tender, non-distended; normal bowel sounds; no masses, no organomegaly   Rectal: Deferred  Musculoskeletal: No cyanosis, clubbing or edema  Normal ROM  Skin:  No jaundice, rashes, or lesions   Heme/Lymph: No palpable cervical lymphadenopathy  Psych: Normal affect, good eye contact  Neuro: AAOx3  In wheelchair  Some intention tremor    Lab Results:   Lab Results   Component Value Date    WBC 5 35 10/07/2022    HGB 7 6 (L) 10/07/2022    HCT 24 4 (L) 10/07/2022     (H) 10/07/2022     10/07/2022     Lab Results   Component Value Date    K 4 1 10/07/2022     10/07/2022    CO2 30 10/07/2022    BUN 25 10/07/2022    CREATININE 1 48 (H) 10/07/2022    GLUCOSE 249 (H) 05/22/2022    CALCIUM 9 0 10/07/2022    CORRECTEDCA 9 5 10/07/2022    AST 20 10/07/2022    ALT 12 10/07/2022    ALKPHOS 119 (H) 10/07/2022    EGFR 46 10/07/2022       Lab Results   Component Value Date    LIPASE 85 10/07/2022       Radiology Results:   No results found

## 2022-12-27 NOTE — LETTER
December 27, 2022     Vega Parks MD  [address]    Patient: Terrie Hassan   YOB: 1950   Date of Visit: 12/27/2022       Dear Dr Gracy Reyez:    Thank you for referring Kusum Soliz to me for evaluation  Below are my notes for this consultation  If you have questions, please do not hesitate to call me  I look forward to following your patient along with you  Sincerely,        Deandra Leong MD        CC: No Recipients  Deandra Leong MD  12/27/2022  9:40 AM  Sign when Signing Visit  [address] [facility] - Outpatient Follow-up Note  Terrie Hassan 67 y o  male MRN: [id number]  Encounter: [id number]    ASSESSMENT AND PLAN:      1  Diarrhea, unspecified type  Work-up with stool studies and colonoscopy with biopsy  Suspect medicine and functional related  Seems to be doing well on cholestyramine  -Continue cholestyramine twice a day    2  Rectal bleed  Intermittent rectal bleeding which I suspect is secondary to hemorrhoids  Had colonoscopy earlier this year  Probably exacerbated by Plavix and he is in a wheelchair  - hydrocortisone (ANUSOL-HC) 2 5 % rectal cream; Apply topically 2 (two) times a day as needed for hemorrhoids  Dispense: 28 g; Refill: 2      Followup Appointment: 1 year  ______________________________________________________________________    Chief Complaint   Patient presents with   • Rectal Bleeding     HPI: The patient is seen in the office in follow-up secondary to diarrhea  Stool studies were negative  Colonoscopy with random biopsies were essentially negative  He was started on cholestyramine which he did not tolerate dissolving and water but has no issues tolerating it when he mixes it in applesauce  He is moving his bowels once a day  His major concern now is having some intermittent rectal bleeding  He reports some blood in the toilet bowl  He denies any excessive straining    He is not in a wheelchair secondary to his multiple sclerosis  Denies any anal burning or itching  His colonoscopy earlier this year showed small internal hemorrhoids  Denies any abdominal pain  His weight is stable  He denies any upper GI symptoms  Historical Information   Past Medical History:   Diagnosis Date   • [facility]    • BPH (benign prostatic hyperplasia)    • Congestive heart failure (CHF) ([facility])    • Diabetes mellitus ([facility] [address] [address] )    • Hyperlipidemia    • Hypertension    • Multiple sclerosis (HCC)    • Neuropathy    • RLS (restless legs syndrome)      History reviewed  No pertinent surgical history    Social History     Substance and Sexual Activity   Alcohol Use Not Currently     Social History     Substance and Sexual Activity   Drug Use Never     Social History     Tobacco Use   Smoking Status Never   Smokeless Tobacco Never     Family History   Problem Relation Age of Onset   • Colon cancer Neg Hx    • Colon polyps Neg Hx          Current Outpatient Medications:   •  acetaminophen (TYLENOL) 325 mg tablet  •  Anti-Diarrheal 2 MG tablet  •  aspirin (ECOTRIN LOW STRENGTH) 81 mg EC tablet  •  atorvastatin (LIPITOR) 40 mg tablet  •  baclofen 10 mg tablet  •  cholestyramine sugar free (QUESTRAN LIGHT) 4 g packet  •  clopidogrel (PLAVIX) 75 mg tablet  •  gabapentin (NEURONTIN) 300 mg capsule  •  gabapentin (NEURONTIN) 600 MG tablet  •  hydrocortisone (ANUSOL-HC) 2 5 % rectal cream  •  hydroxyurea (HYDREA) 500 mg capsule  •  insulin glargine (LANTUS) 100 units/mL subcutaneous injection  •  insulin lispro (HumaLOG) 100 units/mL injection  •  loratadine (CLARITIN) 10 mg tablet  •  metoprolol succinate (TOPROL-XL) 50 mg 24 hr tablet  •  ondansetron (ZOFRAN-ODT) 4 mg disintegrating tablet  •  pantoprazole (PROTONIX) 40 mg tablet  •  potassium chloride (K-DUR,KLOR-CON) 20 mEq tablet  •  ranolazine (RANEXA) 500 mg 12 hr tablet  •  rOPINIRole (REQUIP) 0 5 mg tablet  •  tamsulosin (FLOMAX) 0 4 mg  •  torsemide (DEMADEX) 20 mg tablet  Allergies   Allergen Reactions   • Metformin GI Intolerance     Reviewed medications and allergies and updated as indicated    PHYSICAL EXAM:    Blood pressure 119/58, height 5' 10" (1 778 m), weight 82 6 kg (182 lb)  Body mass index is 26 11 kg/m²  General Appearance: NAD, cooperative, alert  Eyes: Anicteric, PERRLA, EOMI  ENT:  Normocephalic, atraumatic, normal mucosa  Neck:  Supple, symmetrical, trachea midline  Resp:  Clear to auscultation bilaterally; no rales, rhonchi or wheezing; respirations unlabored   CV:  S1 S2, Regular rate and rhythm; no murmur, rub, or gallop  GI:  Soft, non-tender, non-distended; normal bowel sounds; no masses, no organomegaly   Rectal: Deferred  Musculoskeletal: No cyanosis, clubbing or edema  Normal ROM  Skin:  No jaundice, rashes, or lesions   Heme/Lymph: No palpable cervical lymphadenopathy  Psych: Normal affect, good eye contact  Neuro: AAOx3  In wheelchair  Some intention tremor    Lab Results:   Lab Results   Component Value Date    WBC 5 35 10/07/2022    HGB 7 6 (L) 10/07/2022    HCT 24 4 (L) 10/07/2022     (H) 10/07/2022     10/07/2022     Lab Results   Component Value Date    K 4 1 10/07/2022     10/07/2022    CO2 30 10/07/2022    BUN 25 10/07/2022    CREATININE 1 48 (H) 10/07/2022    GLUCOSE 249 (H) 05/22/2022    CALCIUM 9 0 10/07/2022    CORRECTEDCA 9 5 10/07/2022    AST 20 10/07/2022    ALT 12 10/07/2022    ALKPHOS 119 (H) 10/07/2022    EGFR 46 10/07/2022       Lab Results   Component Value Date    LIPASE 85 10/07/2022       Radiology Results:   No results found

## 2022-12-27 NOTE — PATIENT INSTRUCTIONS
Continue Questran dissolved in applesauce twice a day  ProctoCream hemorrhoid cream twice a day as needed for rectal bleeding  Follow-up office visit 1 year    If have recurrent diarrhea or worsening rectal bleeding notify me

## 2023-01-20 ENCOUNTER — OFFICE VISIT (OUTPATIENT)
Dept: CARDIOLOGY CLINIC | Facility: CLINIC | Age: 73
End: 2023-01-20

## 2023-01-20 VITALS
HEART RATE: 65 BPM | WEIGHT: 194 LBS | DIASTOLIC BLOOD PRESSURE: 54 MMHG | OXYGEN SATURATION: 97 % | HEIGHT: 70 IN | BODY MASS INDEX: 27.77 KG/M2 | SYSTOLIC BLOOD PRESSURE: 118 MMHG

## 2023-01-20 DIAGNOSIS — I25.10 CORONARY ARTERY DISEASE INVOLVING NATIVE CORONARY ARTERY OF NATIVE HEART WITHOUT ANGINA PECTORIS: Primary | ICD-10-CM

## 2023-01-20 DIAGNOSIS — I50.30 HEART FAILURE WITH PRESERVED EJECTION FRACTION, UNSPECIFIED HF CHRONICITY (HCC): ICD-10-CM

## 2023-01-20 DIAGNOSIS — I47.1 ATRIAL TACHYCARDIA (HCC): ICD-10-CM

## 2023-01-20 RX ORDER — CHOLESTYRAMINE 4 G/9G
POWDER, FOR SUSPENSION ORAL
COMMUNITY
Start: 2023-01-17 | End: 2023-01-20

## 2023-01-20 NOTE — PROGRESS NOTES
Cardiology Follow Up    Misty Schaffer  1950  [id number]  [address] [facility] Michael Patrick  [address] [place] [address]  [address] [facility] [address]  [phone]    1  Coronary artery disease involving native coronary artery of native heart without angina pectoris        2  Heart failure with preserved ejection fraction, unspecified HF chronicity ([facility] )        3  Atrial tachycardia (HCC)            Interval History: Followup chf    Doing well  No chest pain, dyspnea or palpitations  Labs reviewed  Cr  Stable 1 5 -1 6    Medical Problems     Problem List     Diarrhea    Multiple sclerosis (HCC)    Antiplatelet or antithrombotic long-term use    HTN (hypertension)    Overview Signed 5/22/2022 11:50 PM by Jumana Rocha PA-C     Last Assessment & Plan:   Formatting of this note might be different from the original   Controlled           Acute pulmonary edema (HCC)    Acute respiratory failure (HCC)    Coronary artery disease involving native coronary artery    Elevated troponin    Type 2 diabetes mellitus, with long-term current use of insulin ([facility] )      Lab Results   Component Value Date    HGBA1C 5 3 05/22/2022         Essential (hemorrhagic) thrombocythemia (HCC)    S[facility]ic inflammatory response syndrome) [facility]    Pure hypercholesterolemia    Ischemic cardiomyopathy    Abnormal echocardiogram    Acute on chronic combined systolic and diastolic congestive heart failure (HCC)    Wt Readings from Last 3 Encounters:   01/20/23 88 kg (194 lb)   12/27/22 82 6 kg (182 lb)   10/07/22 82 6 kg (182 lb)                 PVD (peripheral vascular disease) (HCC)    Stage 3 chronic kidney disease, unspecified whether stage 3a or 3b CKD (HCC)    Lab Results   Component Value Date    EGFR 46 10/07/2022    EGFR 45 07/08/2022    EGFR 52 07/07/2022    CREATININE 1 48 (H) 10/07/2022    CREATININE 1 53 (H) 07/08/2022    CREATININE 1 35 (H) 07/07/2022         Pancytopenia ([place] [address] )    Hypomagnesemia    Lactic acidosis    Atrial tachycardia (HCC)        Past Medical History:   Diagnosis Date   • Atrial fibrillation (HCC)    • BPH (benign prostatic hyperplasia)    • Congestive heart failure (CHF) ([place] [address] )    • Diabetes mellitus (HCC)    • Hyperlipidemia    • Hypertension    • Multiple sclerosis (HCC)    • Neuropathy    • RLS (restless legs syndrome)      Social History     Socioeconomic History   • Marital status: /Civil Union     Spouse name: Not on file   • Number of children: Not on file   • Years of education: Not on file   • Highest education level: Not on file   Occupational History   • Not on file   Tobacco Use   • Smoking status: Never   • Smokeless tobacco: Never   Vaping Use   • Vaping Use: Never used   Substance and Sexual Activity   • Alcohol use: Not Currently   • Drug use: Never   • Sexual activity: Not on file   Other Topics Concern   • Not on file   Social History Narrative   • Not on file     Social Determinants of Health     Financial Resource Strain: Not on file   Food Insecurity: No Food Insecurity   • Worried About Running Out of Food in the Last Year: Never true   • Ran Out of Food in the Last Year: Never true   Transportation Needs: No Transportation Needs   • Lack of Transportation (Medical): No   • Lack of Transportation (Non-Medical): No   Physical Activity: Not on file   Stress: Not on file   Social Connections: Not on file   Intimate Partner Violence: Not on file   Housing Stability: Low Risk    • Unable to Pay for Housing in the Last Year: No   • Number of Places Lived in the Last Year: 1   • Unstable Housing in the Last Year: No      Family History   Problem Relation Age of Onset   • Hypertension Father    • Heart attack Father    • Sudden death Father    • Colon cancer Neg Hx    • Colon polyps Neg Hx      No past surgical history on file      Current Outpatient Medications:   •  acetaminophen (TYLENOL) 325 mg tablet, Take 650 mg by mouth every 6 (six) hours as needed for mild pain, Disp: , Rfl:   •  Anti-Diarrheal 2 MG tablet, , Disp: , Rfl:   •  aspirin (ECOTRIN LOW STRENGTH) 81 mg EC tablet, Take 81 mg by mouth in the morning , Disp: , Rfl:   •  atorvastatin (LIPITOR) 40 mg tablet, Take 1 tablet (40 mg total) by mouth daily with dinner, Disp: , Rfl: 0  •  baclofen 10 mg tablet, Take 10 mg by mouth every 6 (six) hours as needed for muscle spasms, Disp: , Rfl:   •  cholestyramine sugar free (QUESTRAN LIGHT) 4 g packet, Take 4 g by mouth 2 (two) times a day, Disp: , Rfl:   •  clopidogrel (PLAVIX) 75 mg tablet, Take 75 mg by mouth daily, Disp: , Rfl:   •  gabapentin (NEURONTIN) 300 mg capsule, Take 300 mg by mouth in the morning and 300 mg in the evening , Disp: , Rfl:   •  gabapentin (NEURONTIN) 600 MG tablet, Take 600 mg by mouth daily at bedtime, Disp: , Rfl:   •  hydrocortisone (ANUSOL-HC) 2 5 % rectal cream, Apply topically 2 (two) times a day as needed for hemorrhoids, Disp: 28 g, Rfl: 2  •  hydroxyurea (HYDREA) 500 mg capsule, Take by mouth daily, Disp: , Rfl:   •  insulin glargine (LANTUS) 100 units/mL subcutaneous injection, Inject 20 Units under the skin daily at bedtime, Disp: 10 mL, Rfl: 0  •  insulin lispro (HumaLOG) 100 units/mL injection, Inject 5 Units under the skin 3 (three) times a day with meals, Disp: , Rfl: 0  •  loratadine (CLARITIN) 10 mg tablet, Take 10 mg by mouth daily, Disp: , Rfl:   •  metoprolol succinate (TOPROL-XL) 50 mg 24 hr tablet, Take 1 tablet (50 mg total) by mouth daily, Disp: 30 tablet, Rfl: 0  •  ondansetron (ZOFRAN-ODT) 4 mg disintegrating tablet, Take 4 mg by mouth every 8 (eight) hours as needed for nausea or vomiting, Disp: , Rfl:   •  pantoprazole (PROTONIX) 40 mg tablet, Take 40 mg by mouth daily, Disp: , Rfl:   •  potassium chloride (K-DUR,KLOR-CON) 20 mEq tablet, Take 2 tablets (40 mEq total) by mouth daily, Disp: , Rfl: 0  •  ranolazine (RANEXA) 500 mg 12 hr tablet, Take 1 tablet (500 mg total) by mouth every 12 (twelve) hours, Disp: 60 tablet, Rfl: 0  •  rOPINIRole (REQUIP) 0 5 mg tablet, Take 0 5 mg by mouth daily at bedtime, Disp: , Rfl:   •  tamsulosin (FLOMAX) 0 4 mg, Take 0 4 mg by mouth daily with dinner, Disp: , Rfl:   •  torsemide (DEMADEX) 20 mg tablet, Take 1 tablet (20 mg total) by mouth daily (Patient taking differently: Take 20 mg by mouth 3 (three) times a week M-W-F), Disp: 60 tablet, Rfl: 0  Allergies   Allergen Reactions   • Metformin GI Intolerance       Labs:     Chemistry        Component Value Date/Time    K 4 1 10/07/2022 1550     10/07/2022 1550    CO2 30 10/07/2022 1550    CO2 24 05/22/2022 2150    BUN 25 10/07/2022 1550    CREATININE 1 48 (H) 10/07/2022 1550        Component Value Date/Time    CALCIUM 9 0 10/07/2022 1550    ALKPHOS 119 (H) 10/07/2022 1550    AST 20 10/07/2022 1550    ALT 12 10/07/2022 1550            No results found for: CHOL  Lab Results   Component Value Date    HDL 29 (L) 05/24/2022     Lab Results   Component Value Date    LDLCALC 124 (H) 05/24/2022     Lab Results   Component Value Date    TRIG 122 05/24/2022     No results found for: CHOLHDL    Imaging: No results found  Review of Systems   Constitutional: Negative  HENT: Negative  Eyes: Negative  Cardiovascular: Negative  Respiratory: Negative  Endocrine: Negative  Hematologic/Lymphatic: Negative  Skin: Negative  Musculoskeletal: Negative  Gastrointestinal: Negative  Genitourinary: Negative  Neurological: Negative  Psychiatric/Behavioral: Negative  Allergic/Immunologic: Negative  Vitals:    01/20/23 0904   BP: 118/54   Pulse: 65   SpO2: 97%           Physical Exam  Vitals reviewed  Constitutional:       Appearance: Normal appearance  HENT:      Head: Normocephalic  Nose: Nose normal       Mouth/Throat:      Mouth: Mucous membranes are moist       Pharynx: Oropharynx is clear  Eyes:      General: No scleral icterus       Conjunctiva/sclera: Conjunctivae normal    Cardiovascular:      Rate and Rhythm: Normal rate and regular rhythm  Heart sounds: No murmur heard  No friction rub  No gallop  Pulmonary:      Effort: Pulmonary effort is normal  No respiratory distress  Breath sounds: Normal breath sounds  No wheezing or rales  Abdominal:      General: Abdomen is flat  Bowel sounds are normal  There is no distension  Palpations: Abdomen is soft  Tenderness: There is no abdominal tenderness  There is no guarding  Musculoskeletal:      Cervical back: Normal range of motion and neck supple  Right lower leg: No edema  Left lower leg: No edema  Skin:     General: Skin is warm and dry  Neurological:      General: No focal deficit present  Mental Status: He is alert and oriented to person, place, and time  Psychiatric:         Mood and Affect: Mood normal          Behavior: Behavior normal          Discussion/Summary:    Chronic diastolic CHF EF 38%  Continue with torsemide  Volume status is stable  Cr 1 6      PSVT: Continue with metoprolol at current dosing  No recent symptomatic PSVT       CAD: Continue with current medical therapy  Lipids and lab work with pcp at [facility]  The patient was counseled regarding diagnostic results, instructions for management, risk factor reductions, impressions  total time of encounter was 25 minutes and 15 minutes was spent counseling

## 2023-04-10 PROBLEM — I73.9 PAD (PERIPHERAL ARTERY DISEASE) (HCC): Status: ACTIVE | Noted: 2023-04-10

## 2023-04-10 PROBLEM — R29.90 STROKE-LIKE SYMPTOMS: Status: ACTIVE | Noted: 2023-04-10

## 2023-04-12 PROBLEM — A41.9 SEPSIS (HCC): Status: ACTIVE | Noted: 2023-04-12

## 2023-04-13 PROBLEM — G93.41 METABOLIC ENCEPHALOPATHY: Status: ACTIVE | Noted: 2023-04-10

## 2023-05-17 ENCOUNTER — TELEPHONE (OUTPATIENT)
Dept: NEUROLOGY | Facility: CLINIC | Age: 73
End: 2023-05-17

## 2023-05-17 NOTE — TELEPHONE ENCOUNTER
Called and spoke to patients nurse  Patients nurse confirmed upcoming apt with Ashley Rodriguez PA-C on 5/31/23 @ 1:15pm in the [facility] office

## 2023-05-25 ENCOUNTER — TELEPHONE (OUTPATIENT)
Age: 73
End: 2023-05-25

## 2023-05-25 NOTE — TELEPHONE ENCOUNTER
Called patient and spoke with Michael Franco at [facility]  Delores confirmed the rescheduled appointment date time and location and provider

## 2023-05-29 ENCOUNTER — APPOINTMENT (EMERGENCY)
Dept: RADIOLOGY | Facility: HOSPITAL | Age: 73
End: 2023-05-29

## 2023-05-29 ENCOUNTER — HOSPITAL ENCOUNTER (EMERGENCY)
Facility: HOSPITAL | Age: 73
Discharge: HOME | End: 2023-05-29
Attending: EMERGENCY MEDICINE | Admitting: EMERGENCY MEDICINE

## 2023-05-29 VITALS
RESPIRATION RATE: 16 BRPM | HEART RATE: 100 BPM | DIASTOLIC BLOOD PRESSURE: 52 MMHG | OXYGEN SATURATION: 95 % | SYSTOLIC BLOOD PRESSURE: 92 MMHG

## 2023-05-29 DIAGNOSIS — R62.7 FAILURE TO THRIVE IN ADULT: ICD-10-CM

## 2023-05-29 DIAGNOSIS — R33.9 URINARY RETENTION: ICD-10-CM

## 2023-05-29 DIAGNOSIS — T17.908A ASPIRATION INTO AIRWAY, INITIAL ENCOUNTER: ICD-10-CM

## 2023-05-29 DIAGNOSIS — Z51.5 HOSPICE CARE: ICD-10-CM

## 2023-05-29 DIAGNOSIS — R41.82 ALTERED MENTAL STATUS: Primary | ICD-10-CM

## 2023-05-29 LAB
ALBUMIN SERPL BCP-MCNC: 3.6 G/DL (ref 3.5–5)
ALP SERPL-CCNC: 95 U/L (ref 34–104)
ALT SERPL W P-5'-P-CCNC: 8 U/L (ref 7–52)
ANION GAP SERPL CALCULATED.3IONS-SCNC: 11 MMOL/L (ref 4–13)
ANISOCYTOSIS BLD QL SMEAR: PRESENT
APTT PPP: 34 SECONDS (ref 23–37)
AST SERPL W P-5'-P-CCNC: 16 U/L (ref 13–39)
BASOPHILS # BLD MANUAL: 0.22 THOUSAND/UL (ref 0–0.1)
BASOPHILS NFR MAR MANUAL: 1 % (ref 0–1)
BILIRUB SERPL-MCNC: 0.5 MG/DL (ref 0.2–1)
BUN SERPL-MCNC: 108 MG/DL (ref 5–25)
CALCIUM SERPL-MCNC: 8.9 MG/DL (ref 8.4–10.2)
CHLORIDE SERPL-SCNC: 127 MMOL/L (ref 96–108)
CO2 SERPL-SCNC: 15 MMOL/L (ref 21–32)
CREAT SERPL-MCNC: 4.58 MG/DL (ref 0.6–1.3)
EOSINOPHIL # BLD MANUAL: 0.22 THOUSAND/UL (ref 0–0.4)
EOSINOPHIL NFR BLD MANUAL: 1 % (ref 0–6)
ERYTHROCYTE [DISTWIDTH] IN BLOOD BY AUTOMATED COUNT: 22.3 % (ref 11.6–15.1)
GFR SERPL CREATININE-BSD FRML MDRD: 11 ML/MIN/1.73SQ M
GLUCOSE SERPL-MCNC: 133 MG/DL (ref 65–140)
HCT VFR BLD AUTO: 33.1 % (ref 36.5–49.3)
HGB BLD-MCNC: 9.7 G/DL (ref 12–17)
INR PPP: 1.26 (ref 0.84–1.19)
LACTATE SERPL-SCNC: 1.9 MMOL/L (ref 0.5–2)
LG PLATELETS BLD QL SMEAR: PRESENT
LYMPHOCYTES # BLD AUTO: 1.32 THOUSAND/UL (ref 0.6–4.47)
LYMPHOCYTES # BLD AUTO: 6 % (ref 14–44)
MCH RBC QN AUTO: 31.5 PG (ref 26.8–34.3)
MCHC RBC AUTO-ENTMCNC: 29.3 G/DL (ref 31.4–37.4)
MCV RBC AUTO: 108 FL (ref 82–98)
MONOCYTES # BLD AUTO: 1.76 THOUSAND/UL (ref 0–1.22)
MONOCYTES NFR BLD: 8 % (ref 4–12)
NEUTROPHILS # BLD MANUAL: 18.52 THOUSAND/UL (ref 1.85–7.62)
NEUTS BAND NFR BLD MANUAL: 10 % (ref 0–8)
NEUTS SEG NFR BLD AUTO: 74 % (ref 43–75)
PLATELET # BLD AUTO: 561 THOUSANDS/UL (ref 149–390)
PLATELET BLD QL SMEAR: ABNORMAL
PMV BLD AUTO: 11.4 FL (ref 8.9–12.7)
POTASSIUM SERPL-SCNC: 3.7 MMOL/L (ref 3.5–5.3)
PROCALCITONIN SERPL-MCNC: 1.95 NG/ML
PROT SERPL-MCNC: 8.7 G/DL (ref 6.4–8.4)
PROTHROMBIN TIME: 16.6 SECONDS (ref 11.6–14.5)
RBC # BLD AUTO: 3.08 MILLION/UL (ref 3.88–5.62)
RBC MORPH BLD: PRESENT
SODIUM SERPL-SCNC: 153 MMOL/L (ref 135–147)
WBC # BLD AUTO: 22.05 THOUSAND/UL (ref 4.31–10.16)

## 2023-05-29 RX ORDER — SODIUM CHLORIDE 9 MG/ML
3 INJECTION INTRAVENOUS EVERY 8 HOURS SCHEDULED
Status: DISCONTINUED | OUTPATIENT
Start: 2023-05-29 | End: 2023-05-29

## 2023-05-29 RX ADMIN — SODIUM CHLORIDE 1000 ML: 0.9 INJECTION, SOLUTION INTRAVENOUS at 17:26

## 2023-05-29 RX ADMIN — SODIUM CHLORIDE 3 ML: 9 INJECTION, SOLUTION INTRAMUSCULAR; INTRAVENOUS; SUBCUTANEOUS at 17:35

## 2023-05-29 NOTE — ED PROVIDER NOTES
History  Chief Complaint   Patient presents with   • Altered Mental Status     Pt to er via ems from [facility] with reports that patient has been declining in his mentation for the past couple days  Patient has been having increased trouble swallowing has possible aspiration  Pressures were soft for ems at 80/44  Has some NSS and pressures went to 116/66      Hx from paramedics, medical records, and nurse at [facility], [facility] MS, afbib, CHF, HTN, brought by ambulance for decreased mentation over last couple days  Increased trouble swallowing  Patient follows commands but incomprehensible, opens eyes to verbal command  POLST form shows comfort measures only          Prior to Admission Medications   Prescriptions Last Dose Informant Patient Reported? Taking? Anti-Diarrheal 2 MG tablet  Outside Facility (Specify) Yes No   acetaminophen (TYLENOL) 325 mg tablet  Outside Facility (Specify) Yes No   Sig: Take 650 mg by mouth every 6 (six) hours as needed for mild pain   aspirin (ECOTRIN LOW STRENGTH) 81 mg EC tablet  Outside Facility (Specify) Yes No   Sig: Take 81 mg by mouth in the morning     atorvastatin (LIPITOR) 40 mg tablet  Outside Facility (Specify) No No   Sig: Take 1 tablet (40 mg total) by mouth daily with dinner   baclofen 10 mg tablet  Outside Facility (Specify) Yes No   Sig: Take 10 mg by mouth every 6 (six) hours as needed for muscle spasms   cholestyramine sugar free (QUESTRAN LIGHT) 4 g packet  Outside Facility (Specify) Yes No   Sig: Take 4 g by mouth 2 (two) times a day   clopidogrel (PLAVIX) 75 mg tablet  Outside Facility (Specify) Yes No   Sig: Take 75 mg by mouth daily   gabapentin (NEURONTIN) 300 mg capsule  Outside Facility (Specify) Yes No   Sig: Take 300 mg by mouth in the morning and 300 mg in the evening    gabapentin (NEURONTIN) 600 MG tablet  Outside Facility (Specify) Yes No   Sig: Take 600 mg by mouth daily at bedtime   hydrocortisone (ANUSOL-HC) 2 5 % rectal cream  Outside Facility (Specify) No No   Sig: Apply topically 2 (two) times a day as needed for hemorrhoids   hydroxyurea (HYDREA) 500 mg capsule  Outside Facility (Specify) Yes No   Sig: Take by mouth daily   insulin glargine (LANTUS) 100 units/mL subcutaneous injection   No No   Sig: Inject 10 Units under the skin daily at bedtime   loratadine (CLARITIN) 10 mg tablet  Outside Facility (Specify) Yes No   Sig: Take 10 mg by mouth daily   metoprolol succinate (TOPROL-XL) 50 mg 24 hr tablet  Outside Facility (Specify) No No   Sig: Take 1 tablet (50 mg total) by mouth daily   ondansetron (ZOFRAN-ODT) 4 mg disintegrating tablet  Outside Facility (Specify) Yes No   Sig: Take 4 mg by mouth every 8 (eight) hours as needed for nausea or vomiting   pantoprazole (PROTONIX) 40 mg tablet  Outside Facility (Specify) Yes No   Sig: Take 40 mg by mouth daily   potassium chloride (K-DUR,KLOR-CON) 20 mEq tablet  Outside Facility (Specify) No No   Sig: Take 2 tablets (40 mEq total) by mouth daily   rOPINIRole (REQUIP) 0 5 mg tablet  Outside Facility (Specify) Yes No   Sig: Take 0 5 mg by mouth daily at bedtime   ranolazine (RANEXA) 500 mg 12 hr tablet  Outside Facility (Specify) No No   Sig: Take 1 tablet (500 mg total) by mouth every 12 (twelve) hours   tamsulosin (FLOMAX) 0 4 mg  Outside Facility (Specify) Yes No   Sig: Take 0 4 mg by mouth daily with dinner   torsemide (DEMADEX) 20 mg tablet  Outside Facility (Specify) No No   Sig: Take 1 tablet (20 mg total) by mouth daily   Patient taking differently: Take 20 mg by mouth 3 (three) times a week M-W-F      Facility-Administered Medications: None       Past Medical History:   Diagnosis Date   • Atrial fibrillation (HCC)    • BPH (benign prostatic hyperplasia)    • Congestive heart failure (CHF) (HCC)    • Diabetes mellitus (HCC)    • Hyperlipidemia    • Hypertension    • Multiple sclerosis (HCC)    • Neuropathy    • RLS (restless legs syndrome)        History reviewed   No pertinent surgical history  Family History   Problem Relation Age of Onset   • Hypertension Father    • Heart attack Father    • Sudden death Father    • Colon cancer Neg Hx    • Colon polyps Neg Hx      I have reviewed and agree with the history as documented  E-Cigarette/Vaping   • E-Cigarette Use Never User      E-Cigarette/Vaping Substances   • Nicotine No    • THC No    • CBD No    • Flavoring No    • Other No    • Unknown No      Social History     Tobacco Use   • Smoking status: Never   • Smokeless tobacco: Never   Vaping Use   • Vaping Use: Never used   Substance Use Topics   • Alcohol use: Not Currently   • Drug use: Never       Review of Systems   Unable to perform ROS: Mental status change   All other systems reviewed and are negative  Physical Exam  Physical Exam  Vitals and nursing note reviewed  Constitutional:       Appearance: He is diaphoretic  HENT:      Head: Normocephalic and atraumatic  Right Ear: External ear normal       Left Ear: External ear normal       Nose: Nose normal       Mouth/Throat:      Mouth: Mucous membranes are dry  Comments: Mucous in airway, unable to handle secretions  Eyes:      Conjunctiva/sclera: Conjunctivae normal       Pupils: Pupils are equal, round, and reactive to light  Cardiovascular:      Rate and Rhythm: Normal rate and regular rhythm  Heart sounds: Normal heart sounds  Pulmonary:      Effort: Pulmonary effort is normal  No respiratory distress  Breath sounds: Normal breath sounds  No wheezing  Abdominal:      General: Bowel sounds are normal  There is no distension  Palpations: Abdomen is soft  Tenderness: There is no abdominal tenderness  Musculoskeletal:         General: No deformity  Normal range of motion  Cervical back: Normal range of motion and neck supple  No spinous process tenderness  Skin:     General: Skin is warm  Findings: No rash  Neurological:      General: No focal deficit present        Mental Status: He is lethargic  GCS: GCS eye subscore is 3  GCS verbal subscore is 2  GCS motor subscore is 6  Sensory: No sensory deficit  Motor: Weakness present  Comments: Unable to move right arm or leg    Weakness in left arm and leg - barely lifts off bed on his own   Psychiatric:         Mood and Affect: Mood normal          Vital Signs  ED Triage Vitals [05/29/23 1614]   Temp Pulse Respirations Blood Pressure SpO2   -- 105 18 109/57 93 %      Temp src Heart Rate Source Patient Position - Orthostatic VS BP Location FiO2 (%)   -- Monitor Sitting Right arm --      Pain Score       --           Vitals:    05/29/23 1614 05/29/23 1719 05/29/23 1720 05/29/23 1745   BP: 109/57 (!) 73/50 101/56 92/52   Pulse: 105  104 100   Patient Position - Orthostatic VS: Sitting Lying Lying          Visual Acuity      ED Medications  Medications - No data to display    Diagnostic Studies  Results Reviewed     Procedure Component Value Units Date/Time    Procalcitonin [[id number]]  (Abnormal) Collected: 05/29/23 1732    Lab Status: Final result Specimen: Blood from Arm, Right Updated: 05/29/23 1805     Procalcitonin 1 95 ng/ml     Manual Differential(PHLEBS Do Not Order) [[id number]]  (Abnormal) Collected: 05/29/23 1732    Lab Status: Final result Specimen: Blood from Arm, Right Updated: 05/29/23 1805     Segmented % 74 %      Bands % 10 %      Lymphocytes % 6 %      Monocytes % 8 %      Eosinophils, % 1 %      Basophils % 1 %      Absolute Neutrophils 18 52 Thousand/uL      Lymphocytes Absolute 1 32 Thousand/uL      Monocytes Absolute 1 76 Thousand/uL      Eosinophils Absolute 0 22 Thousand/uL      Basophils Absolute 0 22 Thousand/uL      Total Counted --     RBC Morphology Present     Anisocytosis Present     Platelet Estimate Increased     Large Platelet Present    CBC and differential [[id number]]  (Abnormal) Collected: 05/29/23 1732    Lab Status: Final result Specimen: Blood from Arm, Right Updated: 05/29/23 1805 WBC 22 05 Thousand/uL      RBC 3 08 Million/uL      Hemoglobin 9 7 g/dL      Hematocrit 33 1 %       fL      MCH 31 5 pg      MCHC 29 3 g/dL      RDW 22 3 %      MPV 11 4 fL      Platelets 973 Thousands/uL     Narrative: This is an appended report  These results have been appended to a previously verified report  Lactic acid [[id number]]  (Normal) Collected: 05/29/23 1732    Lab Status: Final result Specimen: Blood from Arm, Right Updated: 05/29/23 1758     LACTIC ACID 1 9 mmol/L     Narrative:      Result may be elevated if tourniquet was used during collection      Protime-INR [[id number]]  (Abnormal) Collected: 05/29/23 1732    Lab Status: Final result Specimen: Blood from Arm, Right Updated: 05/29/23 1757     Protime 16 6 seconds      INR 1 26    APTT [[id number]]  (Normal) Collected: 05/29/23 1732    Lab Status: Final result Specimen: Blood from Arm, Right Updated: 05/29/23 1757     PTT 34 seconds     Comprehensive metabolic panel [[id number]]  (Abnormal) Collected: 05/29/23 1732    Lab Status: Final result Specimen: Blood from Arm, Right Updated: 05/29/23 1755     Sodium 153 mmol/L      Potassium 3 7 mmol/L      Chloride 127 mmol/L      CO2 15 mmol/L      ANION GAP 11 mmol/L       mg/dL      Creatinine 4 58 mg/dL      Glucose 133 mg/dL      Calcium 8 9 mg/dL      AST 16 U/L      ALT 8 U/L      Alkaline Phosphatase 95 U/L      Total Protein 8 7 g/dL      Albumin 3 6 g/dL      Total Bilirubin 0 50 mg/dL      eGFR 11 ml/min/1 73sq m     Narrative:      [facility] guidelines for Chronic Kidney Disease (CKD):   •  Stage 1 with normal or high GFR (GFR > 90 mL/min/1 73 square meters)  •  Stage 2 Mild CKD (GFR = 60-89 mL/min/1 73 square meters)  •  Stage 3A Moderate CKD (GFR = 45-59 mL/min/1 73 square meters)  •  Stage 3B Moderate CKD (GFR = 30-44 mL/min/1 73 square meters)  •  Stage 4 Severe CKD (GFR = 15-29 mL/min/1 73 square meters)  •  Stage 5 End Stage CKD (GFR <15 mL/min/1 73 square meters)  Note: GFR calculation is accurate only with a steady state creatinine    Blood culture #2 [[id number]] Collected: 05/29/23 1732    Lab Status: In process Specimen: Blood from Arm, Left Updated: 05/29/23 1736    Blood culture #1 [[id number]] Collected: 05/29/23 1732    Lab Status: In process Specimen: Blood from Arm, Right Updated: 05/29/23 1736                 XR chest portable   ED Interpretation by Frankie Theodore DO (05/29 1722)   No acute abnl                 Procedures  Procedures         ED Course  ED Course as of 05/29/23 2044   Mon May 29, 2023   1654 Called the nursing home twice without any answer no callback  POLST form that they sent here is a copy and it is dated from 2017  It says do not hospitalize  Called the wife who was unaware of this form and request patient to get IV antibiotics but nothing else extensive  No central line no pressors no CPR  I did explain that I do not think that that would help him that he may aspirate again  She is going to speak with her family  Patient's wife called back and they do not want to do abx  They would like her to return to [facility]  Nurse at [facility] called back - they can do hospice at their facility  SBIRT 22yo+    Flowsheet Row Most Recent Value   Initial Alcohol Screen: US AUDIT-C     1  How often do you have a drink containing alcohol? 0 Filed at: 05/29/2023 1616   2  How many drinks containing alcohol do you have on a typical day you are drinking? 0 Filed at: 05/29/2023 1616   3a  Male UNDER 65: How often do you have five or more drinks on one occasion? 0 Filed at: 05/29/2023 1616   3b  FEMALE Any Age, or MALE 65+: How often do you have 4 or more drinks on one occassion? 0 Filed at: 05/29/2023 1616   Audit-C Score 0 Filed at: 05/29/2023 1616   DANITZA: How many times in the past year have you    Used an illegal drug or used a prescription medication for non-medical reasons?  Never Filed at: 05/29/2023 1616                    Medical Decision Making  Differential includes sepsis possible aspiration pneumonia or UTI  Failure to thrive, acute encephalopathy possibly exacerbation of chronic illness including MS, less likely stroke as patient was here last month with similar symptoms and imaging was without abnormalities  Amount and/or Complexity of Data Reviewed  Labs: ordered  Radiology: ordered and independent interpretation performed  Disposition  Final diagnoses: Altered mental status   Aspiration into airway, initial encounter   Failure to thrive in adult   Hospice care   Urinary retention     Time reflects when diagnosis was documented in both MDM as applicable and the Disposition within this note     Time User Action Codes Description Comment    5/29/2023  5:54 PM Lynett Jaimee Add [R41 82] Altered mental status     5/29/2023  5:54 PM Lynett Jaimee Add [B03 489F] Aspiration into airway, initial encounter     5/29/2023  5:55 PM Lynett Jaimee Add [R62 7] Failure to thrive in adult     5/29/2023  5:55 PM Lynett Jaimee Add [Z51 5] Hospice care     5/29/2023  5:55 PM Lynett Jaimee Add [R33 9] Urinary retention       ED Disposition     ED Disposition   Discharge    Condition   Stable    Date/Time   Mon May 29, 2023  5:54 PM    [address] discharge to home/self care                 Follow-up Information     Follow up With Specialties Details Why Contact Info    Justina Saini MD Internal Medicine Call   [address] [address] [address]            Discharge Medication List as of 5/29/2023  5:58 PM      CONTINUE these medications which have NOT CHANGED    Details   acetaminophen (TYLENOL) 325 mg tablet Take 650 mg by mouth every 6 (six) hours as needed for mild pain, Historical Med      Anti-Diarrheal 2 MG tablet Starting Sun 8/14/2022, Historical Med      aspirin (ECOTRIN LOW STRENGTH) 81 mg EC tablet Take 81 mg by mouth in the morning , Historical Med      baclofen 10 mg tablet Take 10 mg by mouth every 6 (six) hours as needed for muscle spasms, Historical Med      cholestyramine sugar free (QUESTRAN LIGHT) 4 g packet Take 4 g by mouth 2 (two) times a day, Historical Med      gabapentin (NEURONTIN) 300 mg capsule Take 300 mg by mouth in the morning and 300 mg in the evening , Starting Sun 2/20/2022, Historical Med      gabapentin (NEURONTIN) 600 MG tablet Take 600 mg by mouth daily at bedtime, Starting Tue 2/1/2022, Historical Med      hydrocortisone (ANUSOL-HC) 2 5 % rectal cream Apply topically 2 (two) times a day as needed for hemorrhoids, Starting Tue 12/27/2022, Normal      loratadine (CLARITIN) 10 mg tablet Take 10 mg by mouth daily, Historical Med      ondansetron (ZOFRAN-ODT) 4 mg disintegrating tablet Take 4 mg by mouth every 8 (eight) hours as needed for nausea or vomiting, Historical Med      pantoprazole (PROTONIX) 40 mg tablet Take 40 mg by mouth daily, Historical Med      potassium chloride (K-DUR,KLOR-CON) 20 mEq tablet Take 2 tablets (40 mEq total) by mouth daily, Starting Fri 5/27/2022, No Print         STOP taking these medications       atorvastatin (LIPITOR) 40 mg tablet Comments:   Reason for Stopping:         clopidogrel (PLAVIX) 75 mg tablet Comments:   Reason for Stopping:         hydroxyurea (HYDREA) 500 mg capsule Comments:   Reason for Stopping:         insulin glargine (LANTUS) 100 units/mL subcutaneous injection Comments:   Reason for Stopping:         metoprolol succinate (TOPROL-XL) 50 mg 24 hr tablet Comments:   Reason for Stopping:         ranolazine (RANEXA) 500 mg 12 hr tablet Comments:   Reason for Stopping:         rOPINIRole (REQUIP) 0 5 mg tablet Comments:   Reason for Stopping:         tamsulosin (FLOMAX) 0 4 mg Comments:   Reason for Stopping:         torsemide (DEMADEX) 20 mg tablet Comments:   Reason for Stopping:               No discharge procedures on file      PDMP Review     None          ED Provider  Electronically Signed by           Ian Ríos,   05/29/23 2042

## 2023-05-30 ENCOUNTER — TELEPHONE (OUTPATIENT)
Dept: HEMATOLOGY ONCOLOGY | Facility: CLINIC | Age: 73
End: 2023-05-30

## 2023-05-30 NOTE — TELEPHONE ENCOUNTER
Appointment Change  Cancel, Reschedule, Change to Virtual      Who are you speaking with? Physician Office   If it is not the patient, are they listed on an active communication consent form? N/A   Which provider is the appointment scheduled with? CATARINO Spencer   When is the appointment scheduled? Please list date and time 6/19/23   At which location is the appointment scheduled to take place? [place]   Was the appointment rescheduled or changed from an in person visit to a virtual visit? If so, please list the details of the change  No   What is the reason for the appointment change? Hospic   Was [facility] transport scheduled for this visit? No   Does [facility] transport need to be scheduled for the new visit (if applicable) No   Does the patient need an infusion appointment rescheduled? No   Does the patient have an infusion appointment scheduled? If so, when? No   Is the patient undergoing chemotherapy? No   Was the no-show policy reviewed for appointments being changed with less then 24 hours of notice?  Yes

## 2023-06-03 LAB
BACTERIA BLD CULT: NORMAL
BACTERIA BLD CULT: NORMAL

## 2023-06-11 PROBLEM — A41.9 SEPSIS (HCC): Status: RESOLVED | Noted: 2023-04-12 | Resolved: 2023-06-11

## 2023-08-07 NOTE — ASSESSMENT & PLAN NOTE
Thank you for seeing me today,     Please call [phone] to schedule your Breast Imaging   This will be due 11/2024    Follow up in 4 months (based off our discussion) or sooner if you have any breast changes or breast concerns.     Please let me know if there is anything else that I can do for you....If you have any questions please send me a [employer] message (best way to get a hold of me).     Lavonne Diop [facility]  Breast Health Nurse Practitioner                                                                                         Breast Health: Breast Self-Awareness    Information   The best time to do a monthly self-breast exam is about 3 to 5 days after your period starts. Do it at the same time every month. Your breasts are not as tender or lumpy at this time in your monthly cycle.   If you have gone through menopause, do your exam on the same day every month.       What is breast self-awareness?  Breast self-awareness is knowing how your breasts normally look and feel. Your breasts change as you go through different stages of your life. So it’s important to learn what is normal for your breasts. Breast self-awareness helps you notice any changes in your breasts right away. Report any changes to your healthcare provider.    Why is breast self-awareness important?  Many experts now say that women should focus on breast self-awareness instead of doing a breast self-examination (BSE). These experts include the American Cancer Society, the U.S. Preventive Services Task Force, and the American Congress of Obstetricians and Gynecologists. Some experts even advise not teaching women to do a BSE. That’s because research hasn’t shown a clear benefit to doing BSEs.  Breast self-awareness is different than a BSE. Breast self-awareness isn’t about following a certain method and schedule. It’s about knowing what's normal for your breasts. That way you can notice even small changes right away. If you see any changes,  · HS troponin: 448 < 663 <1090  · Suspect that this is secondary to ischemic demand for acute pulmonary edema as above   · Initially held on IV heparin, however once 4 hour returned at 1090 - heparin gtt initiated  · Cardiology already consulted  · Trend troponin until peak report them to your healthcare provider.    Changes to look for  Call your healthcare provider if you find any changes in your breasts that concern you. These changes may include:  A lump  Nipple discharge other than breast milk, especially pink or  bloody discharge   Swelling  A change in size or shape  Skin irritation, such as redness, thickening, or dimpling of the skin   Swollen lymph nodes in the armpit   Nipple problems, such as pain or redness    Look at your breasts directly and in the mirror. Look for changes in skin texture, such as dimpling, puckering, indentations, or skin that looks like an orange peel.  Also note the shape and outline of each breast.  Check to see if the nipple turns inward.  Do the same with your arms raised above your head.     If you find a lump  Contact your provider if you find lumpiness in one breast, feel something different in the tissue, or feel a definite lump. Sometimes lumpiness may be due to menstrual changes. But there may be reason for concern.  Your provider may want to see you right away if you have:  Nipple discharge that is bloody  Skin changes on your breast, such as dimpling or puckering    It’s normal to be upset if you find a lump. But it’s important to contact your provider right away. Remember that most breast lumps are benign. This means they are not cancer.  © 3417-5011 [employer] [address] [place] [address], [place] [address]. All rights reserved. This information is not intended as a substitute for professional medical care. Always follow your healthcare professional's instructions.

## 2025-03-28 NOTE — CONSULTS
Medical Oncology/Hematology Consult Note  Yeyo Estrella, male, 70 y  o , 1950,  /-[birth year], [id number]     Reason for admission: pancytopenia  Reason for consultation: Pancytopenia      ASSESSMENT AND PLAN:     1  Essential thrombocythemia  2  Pancytopenia   Follows with Dr Kiera Loza for recent ET diagnosis 5/2022, JAK2 V16F positive disease  Time of diagnosis, plt count > 1 million  Started on hydrea 1000mg BID   Patient was to have close monitoring of blood counts while on Hydrea at his nursing home  Unfortunately Hematology was not receiving results of blood work   Labs  finally acquired from nursing facility on 07/01 and patient was noted to be pancytopenic  Instruction was given to his nursing facility to hold his Hydrea x 1 week then restart 500mg BID with q2 week cbc then after per Dr Elsi Ch On admission 7/4, his white count 1 5, hemoglobin 7 2, platelets 45  ANC 0 85  Pancytopenia is secondary to Hydrea  Medication is currently on hold and will continue to be held at this time   Patient's counts are slowly improving  White count today is up to 2 03, platelet count 59  His hemoglobin has been fluctuating  Initially reported at 7 1 today  H&H recheck at noon and hemoglobin was up to 7 8  Of note, he did receive 1 unit of blood in the ED    Hemolysis workup negative  Renal function normal   T bili was slightly elevated  This has improved  T bili today 0 9  Direct bili 0 23   FOBT requested to make sure there is no GI blood loss contributing to anemia  Patient does have chronic diarrhea  Colonoscopy completed 3/22 was negative    Please continue to monitor CBC with differential daily      Hemoglobin   Date Value Ref Range Status   07/05/2022 7 9 (L) 12 0 - 17 0 g/dL Final   07/04/2022 8 4 (L) 12 0 - 17 0 g/dL Final   07/04/2022 7 6 (L) 12 0 - 17 0 g/dL Final     Platelets   Date Value Ref Range Status   07/05/2022 50 (L) 149 - 390 Thousands/uL Final   07/04/2022 45 (LL) Please do your labs fasting (10-12 hours, water only) at any [facility] lab between now and 03/2026.     149 - 390 Thousands/uL Final     Comment: This result has been called to Cam Foss by Greta Castro on 07/04/2022 01:55:13, and has been read back  05/26/2022 1,161 () 149 - 390 Thousands/uL Final     Comment: This result has been called to heather zamorano by pSivida on 05/26/2022 05:07:53, and has been read back  I will arrange outpatient follow-up for patient with Dr Arianna Reardon in the [facility] office upon discharge  His Hydrea will remain on hold until patient is re-evaluated by Dr Arianna Reardon      Interval History:  Patient seen and examined  He is bed-bound secondary to MS  Denies any chest pain, shortness of breath  Cardiology is following for acute CHF exacerbation  He denies any abnormal bleeding  No evidence of excessive bruisability  History of present illness: The patient is a 19-year-old male who is known to Hematology office with recent diagnosis of ET JAK2 V 16 positive  Platelet count at time of diagnosis was over 1 million however platelet pheresis deferred secondary to performance status and comorbidities  At the time of this admission patient had acute CHF exacerbation and has known history of MS with significant neurological compromise  Of note patient is a resident at [facility]  Patient was initially started on Hydrea 1000 mg b i d  in May     Not a current or former smoker  Review of Systems:   Review of Systems   Gastrointestinal: Positive for diarrhea (chronic)  Musculoskeletal: Positive for gait problem (WC bound h/o MS)  Neurological: Positive for weakness  All other systems reviewed and are negative  PHYSICAL EXAM:    /57   Pulse 81   Temp 98 6 °F (37 °C) (Oral)   Resp 22   Ht 5' 10" (1 778 m)   Wt 88 9 kg (196 lb)   SpO2 96%   BMI 28 12 kg/m²     Physical Exam  Constitutional:       General: He is not in acute distress  Appearance: He is well-developed  He is not diaphoretic     HENT:      Head: Normocephalic and atraumatic  Mouth/Throat:      Pharynx: No oropharyngeal exudate  Eyes:      General: No scleral icterus  Pupils: Pupils are equal, round, and reactive to light  Cardiovascular:      Rate and Rhythm: Normal rate and regular rhythm  Heart sounds: No murmur heard  Pulmonary:      Effort: Pulmonary effort is normal  No respiratory distress  Abdominal:      General: Bowel sounds are normal       Palpations: Abdomen is soft  Musculoskeletal:         General: Normal range of motion  Cervical back: Normal range of motion and neck supple  Right lower leg: No edema  Left lower leg: No edema  Lymphadenopathy:      Cervical: No cervical adenopathy  Skin:     General: Skin is warm and dry  Neurological:      General: No focal deficit present  Mental Status: He is alert and oriented to person, place, and time  Psychiatric:         Mood and Affect: Mood normal          Behavior: Behavior normal          Thought Content:  Thought content normal          Judgment: Judgment normal          LABS:     Recent Results (from the past 48 hour(s))   ECG 12 lead    Collection Time: 07/04/22 12:49 AM   Result Value Ref Range    Ventricular Rate 113 BPM    Atrial Rate 113 BPM    IN Interval 148 ms    QRSD Interval 90 ms    QT Interval 334 ms    QTC Interval 458 ms    P Axis 51 degrees    QRS Axis -33 degrees    T Wave Axis 84 degrees   CBC and differential    Collection Time: 07/04/22  1:07 AM   Result Value Ref Range    WBC 1 51 (LL) 4 31 - 10 16 Thousand/uL    RBC 2 55 (L) 3 88 - 5 62 Million/uL    Hemoglobin 7 2 (L) 12 0 - 17 0 g/dL    Hematocrit 21 8 (L) 36 5 - 49 3 %    MCV 86 82 - 98 fL    MCH 28 2 26 8 - 34 3 pg    MCHC 33 0 31 4 - 37 4 g/dL    RDW 19 7 (H) 11 6 - 15 1 %    MPV 10 2 8 9 - 12 7 fL    Platelets 45 (LL) 762 - 390 Thousands/uL   Comprehensive metabolic panel    Collection Time: 07/04/22  1:07 AM   Result Value Ref Range    Sodium 137 136 - 145 mmol/L Potassium 4 2 3 5 - 5 3 mmol/L    Chloride 103 100 - 108 mmol/L    CO2 22 21 - 32 mmol/L    ANION GAP 12 4 - 13 mmol/L    BUN 29 (H) 5 - 25 mg/dL    Creatinine 1 20 0 60 - 1 30 mg/dL    Glucose 280 (H) 65 - 140 mg/dL    Calcium 9 0 8 3 - 10 1 mg/dL    AST 54 (H) 5 - 45 U/L    ALT 39 12 - 78 U/L    Alkaline Phosphatase 119 (H) 46 - 116 U/L    Total Protein 7 2 6 4 - 8 2 g/dL    Albumin 3 7 3 5 - 5 0 g/dL    Total Bilirubin 1 30 (H) 0 20 - 1 00 mg/dL    eGFR 60 ml/min/1 73sq m   Lactic acid    Collection Time: 07/04/22  1:07 AM   Result Value Ref Range    LACTIC ACID 2 1 (HH) 0 5 - 2 0 mmol/L   Procalcitonin    Collection Time: 07/04/22  1:07 AM   Result Value Ref Range    Procalcitonin 0 08 <=0 25 ng/ml   Protime-INR    Collection Time: 07/04/22  1:07 AM   Result Value Ref Range    Protime 14 0 11 6 - 14 5 seconds    INR 1 09 0 84 - 1 19   APTT    Collection Time: 07/04/22  1:07 AM   Result Value Ref Range    PTT 32 23 - 37 seconds   Blood culture #1    Collection Time: 07/04/22  1:07 AM    Specimen: Arm, Left; Blood   Result Value Ref Range    Blood Culture No Growth at 24 hrs  Blood culture #2    Collection Time: 07/04/22  1:07 AM    Specimen: Arm, Right; Blood   Result Value Ref Range    Blood Culture No Growth at 24 hrs      NT-BNP PRO    Collection Time: 07/04/22  1:07 AM   Result Value Ref Range    NT-proBNP 1,062 (H) <125 pg/mL   Magnesium    Collection Time: 07/04/22  1:07 AM   Result Value Ref Range    Magnesium 1 4 (L) 1 6 - 2 6 mg/dL   COVID/FLU/RSV - 2 hour TAT    Collection Time: 07/04/22  1:07 AM    Specimen: Nose; Nares   Result Value Ref Range    SARS-CoV-2 Negative Negative    INFLUENZA A PCR Negative Negative    INFLUENZA B PCR Negative Negative    RSV PCR Negative Negative   HS Troponin 0hr (reflex protocol)    Collection Time: 07/04/22  1:07 AM   Result Value Ref Range    hs TnI 0hr 2,212 (H) "Refer to ACS Flowchart"- see link ng/L   D-dimer, quantitative    Collection Time: 07/04/22  1:07 AM Result Value Ref Range    D-Dimer, Quant 0 68 (H) <0 50 ug/ml FEU   Lipase    Collection Time: 07/04/22  1:07 AM   Result Value Ref Range    Lipase 51 (L) 73 - 393 u/L   Manual Differential(PHLEBS Do Not Order)    Collection Time: 07/04/22  1:07 AM   Result Value Ref Range    Segmented % 56 43 - 75 %    Lymphocytes % 41 14 - 44 %    Monocytes % 1 (L) 4 - 12 %    Eosinophils, % 2 0 - 6 %    Basophils % 0 0 - 1 %    Absolute Neutrophils 0 85 (L) 1 85 - 7 62 Thousand/uL    Lymphocytes Absolute 0 62 0 60 - 4 47 Thousand/uL    Monocytes Absolute 0 02 0 00 - 1 22 Thousand/uL    Eosinophils Absolute 0 03 0 00 - 0 40 Thousand/uL    Basophils Absolute 0 00 0 00 - 0 10 Thousand/uL    Total Counted      RBC Morphology Present     Anisocytosis Present     Hypochromia Present     Tear Drop Cells Present     Platelet Estimate Decreased (A) Adequate   UA w Reflex to Microscopic w Reflex to Culture    Collection Time: 07/04/22  4:12 AM    Specimen: Urine, Indwelling Hodges Catheter   Result Value Ref Range    Color, UA Yellow     Clarity, UA Clear     Specific Gravity, UA 1 020 1 003 - 1 030    pH, UA 5 5 4 5, 5 0, 5 5, 6 0, 6 5, 7 0, 7 5, 8 0    Leukocytes, UA Negative Negative    Nitrite, UA Negative Negative    Protein, UA Negative Negative mg/dl    Glucose, UA Negative Negative mg/dl    Ketones, UA Negative Negative mg/dl    Urobilinogen, UA 0 2 0 2, 1 0 E U /dl E U /dl    Bilirubin, UA Negative Negative    Occult Blood, UA Negative Negative   Lactic acid 2 Hours    Collection Time: 07/04/22  4:12 AM   Result Value Ref Range    LACTIC ACID 3 1 (HH) 0 5 - 2 0 mmol/L   HS Troponin I 2hr    Collection Time: 07/04/22  4:12 AM   Result Value Ref Range    hs TnI 2hr 2,791 (H) "Refer to ACS Flowchart"- see link ng/L    Delta 2hr hsTnI 579 (H) <20 ng/L   Type and screen    Collection Time: 07/04/22  4:12 AM   Result Value Ref Range    ABO Grouping O     Rh Factor Positive     Antibody Screen Negative     Specimen Expiration Date [id number]    [address] [facility] Blood [facility] Prior to Collection    Collection Time: 07/04/22  4:34 AM   Result Value Ref Range    ABO Grouping O     Rh Factor Positive    HS Troponin I 4hr    Collection Time: 07/04/22  6:13 AM   Result Value Ref Range    hs TnI 4hr 4,556 (H) "Refer to ACS Flowchart"- see link ng/L    Delta 4hr hsTnI 2,344 (H) <20 ng/L   Lactic acid, plasma    Collection Time: 07/04/22  6:13 AM   Result Value Ref Range    LACTIC ACID 2 7 (HH) 0 5 - 2 0 mmol/L   ECG 12 lead    Collection Time: 07/04/22  6:15 AM   Result Value Ref Range    Ventricular Rate 102 BPM    Atrial Rate 102 BPM    WA Interval 192 ms    QRSD Interval 86 ms    QT Interval 348 ms    QTC Interval 453 ms    P [place] 22 degrees    QRS Axis 30 degrees    T Wave Axis 100 degrees   Fingerstick Glucose (POCT)    Collection Time: 07/04/22  7:27 AM   Result Value Ref Range    POC Glucose 214 (H) 65 - 140 mg/dl   HS Troponin 0hr (reflex protocol)    Collection Time: 07/04/22 10:01 AM   Result Value Ref Range    hs TnI 0hr 5,903 (H) "Refer to ACS Flowchart"- see link ng/L   Hemoglobin and hematocrit, blood    Collection Time: 07/04/22 10:01 AM   Result Value Ref Range    Hemoglobin 7 6 (L) 12 0 - 17 0 g/dL    Hematocrit 23 4 (L) 36 5 - 49 3 %   Lactic acid 2 Hours    Collection Time: 07/04/22 10:01 AM   Result Value Ref Range    LACTIC ACID 3 1 (HH) 0 5 - 2 0 mmol/L   Fingerstick Glucose (POCT)    Collection Time: 07/04/22 11:10 AM   Result Value Ref Range    POC Glucose 309 (H) 65 - 140 mg/dl   Fingerstick Glucose (POCT)    Collection Time: 07/04/22  3:40 PM   Result Value Ref Range    POC Glucose 199 (H) 65 - 140 mg/dl   Hemoglobin and hematocrit, blood    Collection Time: 07/04/22  5:50 PM   Result Value Ref Range    Hemoglobin 8 4 (L) 12 0 - 17 0 g/dL    Hematocrit 25 1 (L) 36 5 - 49 3 %   Fingerstick Glucose (POCT)    Collection Time: 07/04/22  9:19 PM   Result Value Ref Range    POC Glucose 205 (H) 65 - 140 mg/dl   CBC and differential    Collection Time: 07/05/22  2:53 AM   Result Value Ref Range    WBC 1 80 (LL) 4 31 - 10 16 Thousand/uL    RBC 2 77 (L) 3 88 - 5 62 Million/uL    Hemoglobin 7 9 (L) 12 0 - 17 0 g/dL    Hematocrit 23 7 (L) 36 5 - 49 3 %    MCV 86 82 - 98 fL    MCH 28 5 26 8 - 34 3 pg    MCHC 33 3 31 4 - 37 4 g/dL    RDW 18 7 (H) 11 6 - 15 1 %    Platelets 50 (L) 599 - 390 Thousands/uL    nRBC 0 /100 WBCs   Comprehensive metabolic panel    Collection Time: 07/05/22  2:53 AM   Result Value Ref Range    Sodium 139 136 - 145 mmol/L    Potassium 3 8 3 5 - 5 3 mmol/L    Chloride 104 100 - 108 mmol/L    CO2 28 21 - 32 mmol/L    ANION GAP 7 4 - 13 mmol/L    BUN 33 (H) 5 - 25 mg/dL    Creatinine 1 26 0 60 - 1 30 mg/dL    Glucose 167 (H) 65 - 140 mg/dL    Calcium 8 9 8 3 - 10 1 mg/dL    AST 79 (H) 5 - 45 U/L    ALT 40 12 - 78 U/L    Alkaline Phosphatase 110 46 - 116 U/L    Total Protein 7 1 6 4 - 8 2 g/dL    Albumin 3 5 3 5 - 5 0 g/dL    Total Bilirubin 1 20 (H) 0 20 - 1 00 mg/dL    eGFR 57 ml/min/1 73sq m   Magnesium    Collection Time: 07/05/22  2:53 AM   Result Value Ref Range    Magnesium 2 3 1 6 - 2 6 mg/dL   Lactic acid, plasma    Collection Time: 07/05/22  2:53 AM   Result Value Ref Range    LACTIC ACID 1 6 0 5 - 2 0 mmol/L   Prepare Leukoreduced RBC: 1 Units    Collection Time: 07/05/22  5:49 AM   Result Value Ref Range    Unit Product Code B9149P50     Unit Number [id number]     Unit ABO O     Unit [facility] POS     Crossmatch Compatible     Unit Dispense Status Presumed Trans     Unit Product Volume 350 ml   Fingerstick Glucose (POCT)    Collection Time: 07/05/22  5:49 AM   Result Value Ref Range    POC Glucose 186 (H) 65 - 140 mg/dl   Fingerstick Glucose (POCT)    Collection Time: 07/05/22  8:14 AM   Result Value Ref Range    POC Glucose 156 (H) 65 - 140 mg/dl   Fingerstick Glucose (POCT)    Collection Time: 07/05/22 11:08 AM   Result Value Ref Range    POC Glucose 186 (H) 65 - 140 mg/dl   Echo follow up/limited w/ contrast if indicated    Collection Time: 07/05/22  2:00 PM   Result Value Ref Range    Aortic valve mean velocity 9 90 m/s    MV E' Tissue Velocity Septal 6 cm/s    MV E' Tissue Velocity Lateral 8 cm/s    LVOT mn grad 1 0 mmHg    AV mean gradient 4 mmHg    AV LVOT peak gradient 2 mmHg    MV valve area p 1/2 method 3 49 cm2    E wave deceleration time 215 ms    LVOT peak jae 0 78 m/s    LVOT peak VTI 14 2 cm    Ao VTI 24 1 cm    AV peak gradient 7 mmHg    MV Peak E Jae 95 cm/s    MV Peak A Jae 0 85 m/s    MV stenosis pressure 1/2 time 63 ms       XR chest portable    Result Date: 7/4/2022  Narrative: CHEST INDICATION:   sob  COMPARISON:  Chest radiograph from 5/22/2022 and 8/3/2009  EXAM PERFORMED/VIEWS:  XR CHEST PORTABLE FINDINGS: Cardiomediastinal silhouette appears unremarkable  Moderate pulmonary edema  No effusion or pneumothorax  Osseous structures appear within normal limits for patient age  Impression: Moderate pulmonary edema  Workstation performed: [id number]         HISTORY:    Past Medical History:   Diagnosis Date    Atrial fibrillation (HCC)     BPH (benign prostatic hyperplasia)     Congestive heart failure (CHF) (HCC)     Diabetes mellitus (HCC)     Hyperlipidemia     Hypertension     Multiple sclerosis (HCC)     Neuropathy     RLS (restless legs syndrome)        History reviewed  No pertinent surgical history      Family History   Problem Relation Age of Onset    Colon cancer Neg Hx     Colon polyps Neg Hx        Social History     Socioeconomic History    Marital status: /Civil Union     Spouse name: None    Number of children: None    Years of education: None    Highest education level: None   Occupational History    None   Tobacco Use    Smoking status: Never Smoker    Smokeless tobacco: Never Used   Vaping Use    Vaping Use: Never used   Substance and Sexual Activity    Alcohol use: Not Currently    Drug use: Never    Sexual activity: None   Other Topics Concern  None   Social History Narrative    None     Social Determinants of Health     Financial Resource Strain: Not on file   Food Insecurity: No Food Insecurity    Worried About Running Out of Food in the Last Year: Never true    Juanpablo of Food in the Last Year: Never true   Transportation Needs: No Transportation Needs    Lack of Transportation (Medical): No    Lack of Transportation (Non-Medical):  No   Physical Activity: Not on file   Stress: Not on file   Social Connections: Not on file   Intimate Partner Violence: Not on file   Housing Stability: Low Risk     Unable to Pay for Housing in the Last Year: No    Number of Places Lived in the Last Year: 1    Unstable Housing in the Last Year: No         Current Facility-Administered Medications:     acetaminophen (TYLENOL) tablet 650 mg, 650 mg, Oral, Q6H PRN, Ho Carreon PA-C    aspirin (ECOTRIN LOW STRENGTH) EC tablet 81 mg, 81 mg, Oral, Daily, Kathi Phoenix PA-C, 81 mg at 07/05/22 0829    atorvastatin (LIPITOR) tablet 40 mg, 40 mg, Oral, Daily With Dinner, Ho Carreon PA-C, 40 mg at 07/04/22 1607    baclofen tablet 10 mg, 10 mg, Oral, Q6H PRN, oH Carreon PA-C, 10 mg at 07/04/22 2134    cholestyramine sugar free (QUESTRAN LIGHT) packet 4 g, 4 g, Oral, BID, Kathi Phoenix PA-C, 4 g at 07/05/22 0829    furosemide (LASIX) injection 40 mg, 40 mg, Intravenous, BID (diuretic), Davin Marcus, CRNP, 40 mg at 07/05/22 7535    gabapentin (NEURONTIN) capsule 300 mg, 300 mg, Oral, BID, Kathi Phoenix PA-C, 300 mg at 07/05/22 2702    gabapentin (NEURONTIN) capsule 600 mg, 600 mg, Oral, HS, Kathi Phoenix PA-C, 600 mg at 07/04/22 2133    insulin glargine (LANTUS) subcutaneous injection 20 Units 0 2 mL, 20 Units, Subcutaneous, HS, Kathi Phoenix PA-C, 20 Units at 07/04/22 2131    insulin lispro (HumaLOG) 100 units/mL subcutaneous injection 1-6 Units, 1-6 Units, Subcutaneous, TID AC, 1 Units at 07/05/22 1125 **AND** Fingerstick Glucose (POCT), , , TID AC, Ho Carreon PAJanetC   insulin lispro (HumaLOG) 100 units/mL subcutaneous injection 1-6 Units, 1-6 Units, Subcutaneous, HS, Kathi Phoenix PA-C, 2 Units at 07/04/22 2132    insulin lispro (HumaLOG) 100 units/mL subcutaneous injection 5 Units, 5 Units, Subcutaneous, TID With Meals, Gabby Best PA-C, 5 Units at 07/05/22 1126    lisinopril (ZESTRIL) tablet 5 mg, 5 mg, Oral, Daily, Kathi Phoenix PA-C, 5 mg at 07/05/22 0842    loratadine (CLARITIN) tablet 10 mg, 10 mg, Oral, Daily, Kathi Phoenix PA-C, 10 mg at 07/05/22 7862    metoprolol succinate (TOPROL-XL) 24 hr tablet 25 mg, 25 mg, Oral, Daily, Kathi Phoenix PA-C, 25 mg at 07/05/22 0842    ondansetron (ZOFRAN) injection 4 mg, 4 mg, Intravenous, Q6H PRN, Gabby Best PA-C    pantoprazole (PROTONIX) EC tablet 40 mg, 40 mg, Oral, Daily Before Breakfast, Kathi Phoenix PA-C, 40 mg at 07/05/22 0829    potassium chloride (K-DUR,KLOR-CON) CR tablet 40 mEq, 40 mEq, Oral, Daily, Kathi Phoenix PA-C, 40 mEq at 07/05/22 0829    rOPINIRole (REQUIP) tablet 0 5 mg, 0 5 mg, Oral, HS, Kathi Phoenix PA-C, 0 5 mg at 07/04/22 2133    Insert peripheral IV, , , Once **AND** sodium chloride (PF) 0 9 % injection 3 mL, 3 mL, Intravenous, Q8H [address], Kathi Phoenix PA-C, 3 mL at 07/04/22 2144    tamsulosin (FLOMAX) capsule 0 4 mg, 0 4 mg, Oral, Daily With Dinner, Gabby Best PA-C, 0 4 mg at 07/04/22 1607    [START ON 7/6/2022] torsemide (DEMADEX) tablet 20 mg, 20 mg, Oral, Daily, Mina Spoon, CRNP    Medications Prior to Admission   Medication    acetaminophen (TYLENOL) 325 mg tablet    aspirin (ECOTRIN LOW STRENGTH) 81 mg EC tablet    atorvastatin (LIPITOR) 40 mg tablet    baclofen 10 mg tablet    cholestyramine sugar free (QUESTRAN LIGHT) 4 g packet    clopidogrel (PLAVIX) 75 mg tablet    furosemide (LASIX) 40 mg tablet    gabapentin (NEURONTIN) 300 mg capsule    gabapentin (NEURONTIN) 600 MG tablet    hydroxyurea (HYDREA) 500 mg capsule    insulin glargine (LANTUS) 100 units/mL subcutaneous injection    insulin lispro (HumaLOG) 100 units/mL injection    lisinopril (ZESTRIL) 5 mg tablet    loratadine (CLARITIN) 10 mg tablet    metoprolol succinate (TOPROL-XL) 25 mg 24 hr tablet    pantoprazole (PROTONIX) 40 mg tablet    potassium chloride (K-DUR,KLOR-CON) 20 mEq tablet    rOPINIRole (REQUIP) 0 5 mg tablet    tamsulosin (FLOMAX) 0 4 mg       Allergies   Allergen Reactions    Metformin GI Intolerance       Labs and pertinent reports reviewed  This note has been generated by voice recognition software system  Therefore, there may be spelling, grammar, and or syntax errors  Please contact if questions arise